# Patient Record
Sex: FEMALE | Race: WHITE | NOT HISPANIC OR LATINO
[De-identification: names, ages, dates, MRNs, and addresses within clinical notes are randomized per-mention and may not be internally consistent; named-entity substitution may affect disease eponyms.]

---

## 2017-01-03 ENCOUNTER — LAB SERVICES (OUTPATIENT)
Dept: OTHER | Age: 52
End: 2017-01-03

## 2017-01-04 LAB
APPEARANCE, URINE: CLEAR
BACTERIA, URINE: ABNORMAL
BILIRUBIN-URINE: ABNORMAL
COLOR, URINE: YELLOW
GLUCOSE URINE-UA: ABNORMAL
HEMATOCRIT: 42.3 % (ref 37–47)
HEMOGLOBIN: 14.1 GM/DL (ref 12–16)
HYALINE CAST, URINE: ABNORMAL /LPF
KETONE-URINE: ABNORMAL
MEAN CORPUSCULAR HEMOGLOBIN: 30.4 PG/CELL (ref 27–35)
MEAN CORPUSCULAR HGB CONC: 33.4 G/DL (ref 32–36)
MEAN CORPUSCULAR VOLUME: 90.7 FL (ref 81–102)
MEAN PLATELET VOLUME: 6.7 FL (ref 6.7–10.4)
MUCUS, URINE: ABNORMAL /LPF
NITRITE-URINE: ABNORMAL
NO CRYSTALS, URINE: ABNORMAL
OCCULT BLOOD URINE: ABNORMAL
PH-URINE: 6 (ref 5–8)
PLATELET COUNT: 299 K/UL (ref 145–375)
PROTEIN-URINE-DIP: ABNORMAL
RBC-URINE: ABNORMAL /HPF (ref 0–2)
RED CELL COUNT: 4.66 M/UL (ref 3.8–5.1)
RED CELL DISTRIBUTION WIDTH: 11.2 % (ref 11.5–14.5)
SPECIFIC GRAVITY-URINE: 1.02 (ref 1.01–1.03)
SQUAMOUS EPITHELIAL CELL URINE: ABNORMAL /LPF (ref 0–2)
URINE LEUKOCYTE ESTERASE: ABNORMAL
UROBILINOGEN-URINE: ABNORMAL MG/DL (ref 0.2–1)
WBC-URINE: ABNORMAL /HPF (ref 0–2)
WHITE BLOOD COUNT: 5.1 K/UL (ref 4.8–10.8)

## 2017-03-07 ENCOUNTER — TELEPHONE (OUTPATIENT)
Dept: FAMILY MEDICINE CLINIC | Facility: CLINIC | Age: 52
End: 2017-03-07

## 2017-03-07 DIAGNOSIS — E78.5 HYPERLIPIDEMIA, UNSPECIFIED HYPERLIPIDEMIA TYPE: ICD-10-CM

## 2017-03-07 DIAGNOSIS — G62.9 NEUROPATHY: ICD-10-CM

## 2017-03-07 RX ORDER — SIMVASTATIN 40 MG
TABLET ORAL
Qty: 30 TABLET | Refills: 11 | Status: SHIPPED | OUTPATIENT
Start: 2017-03-07 | End: 2018-04-02 | Stop reason: SDUPTHER

## 2017-04-01 LAB
CHOLESTEROL, TOTAL: 177 MG/DL
CHOLESTEROL/HDL RATIO: 4.5
HDLC SERPL-MCNC: 39 MG/DL (ref 35–70)
LDL CHOLESTEROL CALCULATED: 93 MG/DL (ref 0–160)
TRIGL SERPL-MCNC: 223 MG/DL
VLDLC SERPL CALC-MCNC: 45 MG/DL

## 2017-04-03 DIAGNOSIS — Z00.00 WELL ADULT EXAM: ICD-10-CM

## 2017-04-03 LAB
ALBUMIN SERPL-MCNC: NORMAL G/DL
ALP BLD-CCNC: NORMAL U/L
ALT SERPL-CCNC: NORMAL U/L
AST SERPL-CCNC: NORMAL U/L
BASOPHILS ABSOLUTE: NORMAL /ΜL
BASOPHILS RELATIVE PERCENT: NORMAL %
BILIRUB SERPL-MCNC: NORMAL MG/DL (ref 0.1–1.4)
BUN BLDV-MCNC: NORMAL MG/DL
CALCIUM SERPL-MCNC: NORMAL MG/DL
CHLORIDE BLD-SCNC: NORMAL MMOL/L
CO2: NORMAL MMOL/L
CREAT SERPL-MCNC: NORMAL MG/DL
EOSINOPHILS ABSOLUTE: NORMAL /ΜL
EOSINOPHILS RELATIVE PERCENT: NORMAL %
GFR CALCULATED: NORMAL
GLUCOSE BLD-MCNC: NORMAL MG/DL
HCT VFR BLD CALC: NORMAL % (ref 36–46)
HEMOGLOBIN: NORMAL G/DL (ref 12–16)
LYMPHOCYTES ABSOLUTE: NORMAL /ΜL
LYMPHOCYTES RELATIVE PERCENT: NORMAL %
MCH RBC QN AUTO: NORMAL PG
MCHC RBC AUTO-ENTMCNC: NORMAL G/DL
MCV RBC AUTO: NORMAL FL
MONOCYTES ABSOLUTE: NORMAL /ΜL
MONOCYTES RELATIVE PERCENT: NORMAL %
NEUTROPHILS ABSOLUTE: NORMAL /ΜL
NEUTROPHILS RELATIVE PERCENT: NORMAL %
PDW BLD-RTO: NORMAL %
PLATELET # BLD: NORMAL K/ΜL
PMV BLD AUTO: NORMAL FL
POTASSIUM SERPL-SCNC: NORMAL MMOL/L
PTH INTACT: NORMAL
RBC # BLD: NORMAL 10^6/ΜL
SODIUM BLD-SCNC: NORMAL MMOL/L
T4 FREE: NORMAL
TOTAL PROTEIN: NORMAL
TSH SERPL DL<=0.05 MIU/L-ACNC: NORMAL UIU/ML
VITAMIN D 25-HYDROXY: NORMAL
VITAMIN D2, 25 HYDROXY: NORMAL
VITAMIN D3,25 HYDROXY: NORMAL
WBC # BLD: NORMAL 10^3/ML

## 2017-04-12 ENCOUNTER — CHARTING TRANS (OUTPATIENT)
Dept: OTHER | Age: 52
End: 2017-04-12

## 2017-04-12 ENCOUNTER — LAB SERVICES (OUTPATIENT)
Dept: OTHER | Age: 52
End: 2017-04-12

## 2017-04-19 ENCOUNTER — TELEPHONE (OUTPATIENT)
Dept: FAMILY MEDICINE CLINIC | Age: 52
End: 2017-04-19

## 2017-04-19 DIAGNOSIS — G89.29 CHRONIC PAIN OF RIGHT KNEE: Primary | ICD-10-CM

## 2017-04-19 DIAGNOSIS — M25.561 CHRONIC PAIN OF RIGHT KNEE: Primary | ICD-10-CM

## 2017-04-19 LAB
ALBUMIN: 4.3 GM/DL (ref 3.5–5)
ALKALINE PHOSPHATASE: 70 U/L (ref 38–126)
ALT: 26 U/L (ref 10–52)
AST/SGOT: 24 U/L (ref 14–40)
BASO #: 0.02 K/UL (ref 0–0.2)
BASO %: 0 % (ref 0–2)
BILIRUBIN TOTAL: 0.7 MG/DL (ref 0.2–1.3)
BUN: 16 MG/DL (ref 7–17)
CALCIUM: 9.4 MG/DL (ref 8.4–10.2)
CARBON DIOXIDE: 31 MMOL/L (ref 22–30)
CHLORIDE: 98 MMOL/L (ref 98–107)
CREATININE: 0.89 MG/DL (ref 0.52–1.04)
EGFR FOR AFRICAN AMERICANS: > 60
EGFR FOR NON-AFRICAN AMERICANS: > 60
EOS #: 0.19 K/UL (ref 0–0.5)
EOS %: 4 % (ref 0–5)
FOLATE SERUM: 18.7 NG/ML
GLUCOSE: 92 MG/DL (ref 70–99)
HEMATOCRIT: 44.3 % (ref 37–47)
HEMOGLOBIN: 14.9 GM/DL (ref 12–16)
LYMPH #: 1.38 K/UL (ref 1–4.8)
LYMPH %: 26 % (ref 22–44)
MAGNESIUM: 2.5 MG/DL (ref 1.6–2.3)
MEAN CORPUSCULAR HEMOGLOBIN: 30.2 PG/CELL (ref 27–35)
MEAN CORPUSCULAR HGB CONC: 33.7 G/DL (ref 32–36)
MEAN CORPUSCULAR VOLUME: 89.7 FL (ref 81–102)
MEAN PLATELET VOLUME: 6.7 FL (ref 6.7–10.4)
MONO #: 0.86 K/UL (ref 0–0.8)
MONO %: 16 % (ref 2–10)
NEUTROPHILS #: 2.83 K/UL (ref 1.8–7.7)
NEUTROPHILS %: 54 % (ref 40–70)
PLATELET COUNT: 278 K/UL (ref 145–375)
POTASSIUM: 4.2 MMOL/L (ref 3.5–5)
RED CELL COUNT: 4.94 M/UL (ref 3.8–5.1)
RED CELL DISTRIBUTION WIDTH: 10.8 % (ref 11.5–14.5)
SODIUM: 140 MMOL/L (ref 136–145)
THYROID STIMULATING HORMONE: 0.95 MIU/ML (ref 0.47–4.68)
TOTAL PROTEIN: 8 GM/DL (ref 6.3–8.3)
VITAMIN B12: 430 PG/ML (ref 239–931)
WHITE BLOOD COUNT: 5.3 K/UL (ref 4.8–10.8)

## 2017-04-25 ENCOUNTER — CHARTING TRANS (OUTPATIENT)
Dept: OTHER | Age: 52
End: 2017-04-25

## 2017-06-14 ENCOUNTER — OFFICE VISIT (OUTPATIENT)
Dept: FAMILY MEDICINE CLINIC | Age: 52
End: 2017-06-14
Payer: COMMERCIAL

## 2017-06-14 DIAGNOSIS — G89.4 CHRONIC PAIN SYNDROME: Primary | ICD-10-CM

## 2017-06-14 PROCEDURE — 96127 BRIEF EMOTIONAL/BEHAV ASSMT: CPT | Performed by: FAMILY MEDICINE

## 2017-06-14 PROCEDURE — 99213 OFFICE O/P EST LOW 20 MIN: CPT | Performed by: FAMILY MEDICINE

## 2017-06-14 RX ORDER — TRIAMCINOLONE ACETONIDE 55 UG/1
110 SPRAY, METERED NASAL PRN
COMMUNITY
End: 2020-10-29

## 2017-06-14 RX ORDER — DULOXETIN HYDROCHLORIDE 60 MG/1
60 CAPSULE, DELAYED RELEASE ORAL DAILY
Qty: 30 CAPSULE | Refills: 11 | Status: SHIPPED | OUTPATIENT
Start: 2017-06-14 | End: 2018-10-17

## 2017-06-14 RX ORDER — DULOXETIN HYDROCHLORIDE 30 MG/1
30 CAPSULE, DELAYED RELEASE ORAL DAILY
Qty: 45 CAPSULE | Refills: 0 | Status: SHIPPED | OUTPATIENT
Start: 2017-06-14 | End: 2017-07-10 | Stop reason: SDUPTHER

## 2017-06-14 RX ORDER — CHOLECALCIFEROL (VITAMIN D3) 50 MCG
2000 TABLET ORAL
COMMUNITY
End: 2018-10-17

## 2017-06-14 ASSESSMENT — PATIENT HEALTH QUESTIONNAIRE - PHQ9
2. FEELING DOWN, DEPRESSED OR HOPELESS: 1
6. FEELING BAD ABOUT YOURSELF - OR THAT YOU ARE A FAILURE OR HAVE LET YOURSELF OR YOUR FAMILY DOWN: 1
3. TROUBLE FALLING OR STAYING ASLEEP: 1
8. MOVING OR SPEAKING SO SLOWLY THAT OTHER PEOPLE COULD HAVE NOTICED. OR THE OPPOSITE, BEING SO FIGETY OR RESTLESS THAT YOU HAVE BEEN MOVING AROUND A LOT MORE THAN USUAL: 0
SUM OF ALL RESPONSES TO PHQ QUESTIONS 1-9: 3
4. FEELING TIRED OR HAVING LITTLE ENERGY: 0
9. THOUGHTS THAT YOU WOULD BE BETTER OFF DEAD, OR OF HURTING YOURSELF: 0
SUM OF ALL RESPONSES TO PHQ9 QUESTIONS 1 & 2: 1
7. TROUBLE CONCENTRATING ON THINGS, SUCH AS READING THE NEWSPAPER OR WATCHING TELEVISION: 0
1. LITTLE INTEREST OR PLEASURE IN DOING THINGS: 0
5. POOR APPETITE OR OVEREATING: 0
10. IF YOU CHECKED OFF ANY PROBLEMS, HOW DIFFICULT HAVE THESE PROBLEMS MADE IT FOR YOU TO DO YOUR WORK, TAKE CARE OF THINGS AT HOME, OR GET ALONG WITH OTHER PEOPLE: 1

## 2017-06-27 ENCOUNTER — CHARTING TRANS (OUTPATIENT)
Dept: OTHER | Age: 52
End: 2017-06-27

## 2017-06-27 ENCOUNTER — LAB SERVICES (OUTPATIENT)
Dept: OTHER | Age: 52
End: 2017-06-27

## 2017-06-28 ENCOUNTER — CHARTING TRANS (OUTPATIENT)
Dept: OTHER | Age: 52
End: 2017-06-28

## 2017-06-28 LAB
ANION GAP SERPL CALC-SCNC: 13 MMOL/L (ref 10–20)
BUN SERPL-MCNC: 22 MG/DL (ref 6–20)
BUN/CREAT SERPL: 27 (ref 7–25)
CALCIUM SERPL-MCNC: 9.5 MG/DL (ref 8.4–10.2)
CHLORIDE SERPL-SCNC: 103 MMOL/L (ref 98–107)
CO2 SERPL-SCNC: 29 MMOL/L (ref 21–32)
CREAT SERPL-MCNC: 0.81 MG/DL (ref 0.51–0.95)
GLUCOSE SERPL-MCNC: 89 MG/DL (ref 65–99)
LENGTH OF FAST TIME PATIENT: ABNORMAL HRS
MAGNESIUM SERPL-MCNC: 2.5 MG/DL (ref 1.7–2.4)
POTASSIUM SERPL-SCNC: 3.9 MMOL/L (ref 3.4–5.1)
SODIUM SERPL-SCNC: 141 MMOL/L (ref 135–145)

## 2017-07-11 ENCOUNTER — TELEPHONE (OUTPATIENT)
Dept: FAMILY MEDICINE CLINIC | Age: 52
End: 2017-07-11

## 2017-08-07 ENCOUNTER — TELEPHONE (OUTPATIENT)
Dept: FAMILY MEDICINE CLINIC | Age: 52
End: 2017-08-07

## 2017-10-02 ENCOUNTER — OFFICE VISIT (OUTPATIENT)
Dept: FAMILY MEDICINE CLINIC | Age: 52
End: 2017-10-02
Payer: COMMERCIAL

## 2017-10-02 VITALS
WEIGHT: 135.8 LBS | HEIGHT: 62 IN | SYSTOLIC BLOOD PRESSURE: 120 MMHG | RESPIRATION RATE: 16 BRPM | HEART RATE: 113 BPM | OXYGEN SATURATION: 97 % | BODY MASS INDEX: 24.99 KG/M2 | DIASTOLIC BLOOD PRESSURE: 72 MMHG

## 2017-10-02 DIAGNOSIS — J20.0 ACUTE BRONCHITIS DUE TO MYCOPLASMA PNEUMONIAE: Primary | ICD-10-CM

## 2017-10-02 PROCEDURE — 99213 OFFICE O/P EST LOW 20 MIN: CPT | Performed by: FAMILY MEDICINE

## 2017-10-02 RX ORDER — HYDROCODONE POLISTIREX AND CHLORPHENIRAMINE POLISTIREX 10; 8 MG/5ML; MG/5ML
5 SUSPENSION, EXTENDED RELEASE ORAL EVERY 12 HOURS PRN
Qty: 115 ML | Refills: 0 | Status: SHIPPED | OUTPATIENT
Start: 2017-10-02 | End: 2018-10-17

## 2017-10-02 RX ORDER — PREDNISONE 20 MG/1
TABLET ORAL
Qty: 20 TABLET | Refills: 0 | Status: SHIPPED | OUTPATIENT
Start: 2017-10-02 | End: 2018-10-17

## 2017-10-02 RX ORDER — MOXIFLOXACIN HYDROCHLORIDE 400 MG/1
400 TABLET ORAL DAILY
Qty: 10 TABLET | Refills: 0 | Status: SHIPPED | OUTPATIENT
Start: 2017-10-02 | End: 2017-10-12

## 2017-10-02 NOTE — MR AVS SNAPSHOT
PROAIR  (90 Base) MCG/ACT inhaler   Instructions:  Inhale 2 puffs into the lungs every 6 hours as needed for Wheezing   Quantity:  1 Inhaler   Refills:  3   Generic drug:  albuterol sulfate HFA   Started by:  Anjana Shaw, DO            Where to Get Your Medications      These medications were sent to [facility] [address], [facility] [place] [address]     Phone:  [phone]     hydrocodone-chlorpheniramine 10-8 MG/5ML Suer    moxifloxacin 400 MG tablet    PROAIR  (90 Base) MCG/ACT inhaler               Your Current Medications Are              moxifloxacin (AVELOX) 400 MG tablet Take 1 tablet by mouth daily for 10 days    PROAIR  (90 Base) MCG/ACT inhaler Inhale 2 puffs into the lungs every 6 hours as needed for Wheezing    hydrocodone-chlorpheniramine (TUSSIONEX PENNKINETIC ER) 10-8 MG/5ML SUER Take 5 mLs by mouth every 12 hours as needed (cough) . naproxen (NAPROSYN) 500 MG tablet TAKE ONE TABLET BY MOUTH TWICE A DAY WITH FOOD    DULoxetine (CYMBALTA) 60 MG extended release capsule Take 1 capsule by mouth daily    Cholecalciferol (VITAMIN D) 2000 units TABS tablet Take 2,000 Units by mouth    doxyLAMINE succinate (GNP SLEEP AID) 25 MG tablet Take 25 mg by mouth    triamcinolone (NASACORT) 55 MCG/ACT nasal inhaler 110 mcg by Nasal route    DULoxetine (CYMBALTA) 60 MG extended release capsule Take 1 capsule by mouth daily    simvastatin (ZOCOR) 40 MG tablet TAKE ONE TABLET BY MOUTH EVERY NIGHT AT BEDTIME    Multiple Vitamin (DAILY VITAMIN PO) Take  by mouth.       Allergies              Trazodone And Nefazodone     Nucynta [Tapentadol] Nausea And Vomiting         Additional Information        Basic Information     Date Of Birth Sex Race Ethnicity Preferred Language    1965 Female White Non-/Non  English      Problem List as of 10/2/2017  Date Reviewed: 12/22/2016                Hyperlipidemia Immunizations as of 10/2/2017     Name Date    Influenza Virus Vaccine 11/14/2013, 10/5/2012, 12/13/2011    Influenza, Ponciano Rubinstein, 3 Years and older, IM 12/22/2016    Pneumococcal 13-valent Conjugate (Chrissy Loza) 12/22/2016      Preventive Care        Date Due    Hepatitis C screening is recommended for all adults regardless of risk factors born between [facility] at least once (lifetime) who have never been tested. 1965    HIV screening is recommended for all people regardless of risk factors  aged 15-65 years at least once (lifetime) who have never been HIV tested. 10/18/1980    Tetanus Combination Vaccine (1 - Tdap) 10/18/1984    Pneumococcal Vaccine - Pneumovax for adults aged 19-64 years with: chronic heart disease, chronic lung disease, diabetes mellitus, alcoholism, chronic liver disease, or cigarette smoking. (1 of 1 - PPSV23) 10/18/1984    Pap Smear 10/18/1986    Colonoscopy 10/18/2015    Mammograms are recommended every 2 years for low/average risk patients aged 48 - 69, and every year for high risk patients per updated national guidelines. However these guidelines can be individualized by your provider. 6/29/2017    Yearly Flu Vaccine (1) 9/1/2017    Cholesterol Screening 4/1/2022            [employer] Signup           [employer] allows you to send messages to your doctor, view your test results, renew your prescriptions, schedule appointments, view visit notes, and more. How Do I Sign Up? 1. In your Internet browser, go to [email or web address]  2. Click on the Sign Up Now link in the Sign In box. You will see the New Member Sign Up page. 3. Enter your [employer] Access Code exactly as it appears below. You will not need to use this code after youve completed the sign-up process. If you do not sign up before the expiration date, you must request a new code.   [employer] Access Code: [email or web address]  Expires: 10/28/2017  9:27 AM 4. Enter your Social Security Number ([id number]) and Date of Birth (mm/dd/yyyy) as indicated and click Submit. You will be taken to the next sign-up page. 5. Create a Viewpoint ID. This will be your Viewpoint login ID and cannot be changed, so think of one that is secure and easy to remember. 6. Create a Viewpoint password. You can change your password at any time. 7. Enter your Password Reset Question and Answer. This can be used at a later time if you forget your password. 8. Enter your e-mail address. You will receive e-mail notification when new information is available in [address] 9. Click Sign Up. You can now view your medical record. Additional Information  If you have questions, please contact the physician practice where you receive care. Remember, [employer] is NOT to be used for urgent needs. For medical emergencies, dial 911. For questions regarding your [employer] account call [phone]. If you have a clinical question, please call your doctor's office.

## 2017-10-02 NOTE — LETTER
[facility]  [address] [address]  Phone: [phone]  Fax: [phone]    Shonda Quevedo DO        October 2, 2017     Patient: Chintan Chacon   YOB: 1965   Date of Visit: 10/2/2017       To Whom It May Concern: It is my medical opinion that Neli Boateng requires a disability parking placard for the following reasons:  She has limited walking ability due to an orthopedic condition. Duration of need: 5 years    If you have any questions or concerns, please don't hesitate to call.     Sincerely,        Zhane Farah, DO

## 2017-10-02 NOTE — PROGRESS NOTES
[facility]  COMPREHENSIVE CARE  [address]  [address] [address]  Dept: [phone]      Justin Morris is a 46 y.o. female who presents today for follow up on her  medical conditions as noted below. Chief Complaint   Patient presents with    Cough     cough x's 1wk, congested x's 2wks, been taking mucinex for 2wks       Patient Active Problem List:     Hyperlipidemia     Cervical pain (neck)     Hypercholesterolemia     Past Medical History:   Diagnosis Date    Hyperlipidemia       Past Surgical History:   Procedure Laterality Date    WISDOM TOOTH EXTRACTION       Family History   Problem Relation Age of Onset    Adopted: Yes       Current Outpatient Prescriptions   Medication Sig Dispense Refill    moxifloxacin (AVELOX) 400 MG tablet Take 1 tablet by mouth daily for 10 days 10 tablet 0    PROAIR  (90 Base) MCG/ACT inhaler Inhale 2 puffs into the lungs every 6 hours as needed for Wheezing 1 Inhaler 3    hydrocodone-chlorpheniramine (TUSSIONEX PENNKINETIC ER) 10-8 MG/5ML SUER Take 5 mLs by mouth every 12 hours as needed (cough) .  115 mL 0    predniSONE (DELTASONE) 20 MG tablet 1 p.o. Q.i.d. x2 days, 1 p.o. T.i.d. x2 days, 1 p.o. B.i.d. x2 days, 1 p.o. Q. Day x2 days dispense quantity suff 20 tablet 0    naproxen (NAPROSYN) 500 MG tablet TAKE ONE TABLET BY MOUTH TWICE A DAY WITH FOOD 60 tablet 11    DULoxetine (CYMBALTA) 60 MG extended release capsule Take 1 capsule by mouth daily 90 capsule 3    Cholecalciferol (VITAMIN D) 2000 units TABS tablet Take 2,000 Units by mouth      doxyLAMINE succinate (GNP SLEEP AID) 25 MG tablet Take 25 mg by mouth      triamcinolone (NASACORT) 55 MCG/ACT nasal inhaler 110 mcg by Nasal route      DULoxetine (CYMBALTA) 60 MG extended release capsule Take 1 capsule by mouth daily 30 capsule 11    simvastatin (ZOCOR) 40 MG tablet TAKE ONE TABLET BY MOUTH EVERY NIGHT AT BEDTIME 30 tablet 11    Multiple Vitamin (DAILY VITAMIN PO) Take by mouth. No current facility-administered medications for this visit. ALLERGIES:    Allergies   Allergen Reactions    Trazodone And Nefazodone     Nucynta [Tapentadol] Nausea And Vomiting       Social History   Substance Use Topics    Smoking status: Current Every Day Smoker     Packs/day: 0.80     Years: 20.00     Types: Cigarettes    Smokeless tobacco: Never Used    Alcohol use 0.0 oz/week     0 Standard drinks or equivalent per week      Comment: socially        LDL Calculated (mg/dL)   Date Value   04/01/2017 93     HDL (mg/dL)   Date Value   04/01/2017 39     BUN (mg/dL)   Date Value   06/01/2013 14     CREATININE (no units)   Date Value   06/01/2013 0.81     Glucose (mg/dL)   Date Value   06/01/2013 95     Hemoglobin A1C (%)   Date Value   07/14/2012 5.6              Subjective:      HPI  She is here today complaining of having a cough for the last 2 weeks started with a sore throat and congestion now it is under a cough or chest hurts her back hurts her stomach hurts from coughing so much she can't get any rest and she is miserable    Also continues to have miserable ongoing knee pain which is unrelenting she has got [facility] who has a program that would be very helpful for her but unfortunately insurance has denied her being able to go to that I think this program would be her best bet I'm going to try to peel this with the insurance company    Review of Systems:     Constitutional: Negative for fever, appetite change and fatigue. Family social and medical history reviewed and unchanged     HENT: Negative. Negative for nosebleeds, trouble swallowing and neck pain. Eyes: Negative for photophobia and visual disturbance. Respiratory: Negative. Negative for chest tightness and shortness of breath. Cardiovascular: Negative. Negative for chest pain and leg swelling. Gastrointestinal: Negative. Negative for abdominal pain and blood in stool.    Endocrine: Negative for cold intolerance and polyuria. Genitourinary: Negative for dysuria and hematuria. Musculoskeletal: Negative. Skin: Negative for rash. Allergic/Immunologic: Negative. Neurological: Negative. Negative for dizziness, weakness and numbness. Hematological: Negative. Negative for adenopathy. Does not bruise/bleed easily. Psychiatric/Behavioral: Negative for sleep disturbance, dysphoric mood and  decreased concentration. The patient is not nervous/anxious. Objective:     Physical Exam:     Nursing note and vitals reviewed. /72  Pulse 113  Resp 16  Ht 5' 1.81\" (1.57 m)  Wt 135 lb 12.8 oz (61.6 kg)  SpO2 97%  Breastfeeding? No  BMI 24.99 kg/m2  Constitutional: She is oriented to person, place, and time. She   appears well-developed and well-nourished. HENT:   Head: Normocephalic and atraumatic. Right Ear: External ear normal. Tympanic membrane is not erythematous. No middle ear effusion. Left Ear: External ear normal. Tympanic membrane is not erythematous. No middle ear effusion. Nose: No mucosal edema. Mouth/Throat: Oropharynx is clear and moist.+ posterior oropharyngeal erythema. Eyes: Conjunctivae and EOM are normal. Pupils are equal, round, and reactive to light. Neck: Normal range of motion. Neck supple. No thyromegaly present. Cardiovascular: Normal rate, regular rhythm and normal heart sounds. No murmur heard. Pulmonary/Chest: Effort normal and breath sounds normal. She has no wheezes. Shehas no rales. constant coughing  Abdominal: Soft. Bowel sounds are normal. She exhibits no distension and no mass. There is no tenderness. There is no rebound and no guarding. Genitourinary/Anorectal:deferred  Musculoskeletal: Normal range of motion. She exhibits no edema or tenderness. Lymphadenopathy: She has no cervical adenopathy. Neurological: She is alert and oriented to person, place, and time. She has normal reflexes. Skin: Skin is warm and dry.  No rash noted.   Psychiatric: She has a normal mood and affect. Her   behavior is normal.       Assessment:      1. Acute bronchitis due to Mycoplasma pneumoniae          Plan:      Call or return to clinic prn if these symptoms worsen or fail to improve as anticipated. I have reviewed the instructions with the patient, answering all questions to her satisfaction. No Follow-up on file. No orders of the defined types were placed in this encounter. Orders Placed This Encounter   Medications    moxifloxacin (AVELOX) 400 MG tablet     Sig: Take 1 tablet by mouth daily for 10 days     Dispense:  10 tablet     Refill:  0    PROAIR  (90 Base) MCG/ACT inhaler     Sig: Inhale 2 puffs into the lungs every 6 hours as needed for Wheezing     Dispense:  1 Inhaler     Refill:  3    hydrocodone-chlorpheniramine (TUSSIONEX PENNKINETIC ER) 10-8 MG/5ML SUER     Sig: Take 5 mLs by mouth every 12 hours as needed (cough) .      Dispense:  115 mL     Refill:  0    predniSONE (DELTASONE) 20 MG tablet     Si p.o. Q.i.d. x2 days, 1 p.o. T.i.d. x2 days, 1 p.o. B.i.d. x2 days, 1 p.o. Q. Day x2 days dispense quantity suff     Dispense:  20 tablet     Refill:  0       Electronically signed by Ghulam Hitchcock DO on 10/2/2017 at 2:16 PM

## 2017-11-03 ENCOUNTER — TELEPHONE (OUTPATIENT)
Dept: FAMILY MEDICINE CLINIC | Age: 52
End: 2017-11-03

## 2017-11-03 NOTE — TELEPHONE ENCOUNTER
Called patient to let her know the referral was approved via peer to peer, phone line is busy on numerous attempts

## 2018-01-18 ENCOUNTER — CHARTING TRANS (OUTPATIENT)
Dept: OTHER | Age: 53
End: 2018-01-18

## 2018-02-16 ENCOUNTER — CHARTING TRANS (OUTPATIENT)
Dept: OTHER | Age: 53
End: 2018-02-16

## 2018-04-02 DIAGNOSIS — G62.9 NEUROPATHY: ICD-10-CM

## 2018-04-02 DIAGNOSIS — E78.5 HYPERLIPIDEMIA, UNSPECIFIED HYPERLIPIDEMIA TYPE: ICD-10-CM

## 2018-04-03 RX ORDER — SIMVASTATIN 40 MG
TABLET ORAL
Qty: 30 TABLET | Refills: 10 | Status: SHIPPED | OUTPATIENT
Start: 2018-04-03 | End: 2019-05-15 | Stop reason: SDUPTHER

## 2018-05-11 ENCOUNTER — CHARTING TRANS (OUTPATIENT)
Dept: OTHER | Age: 53
End: 2018-05-11

## 2018-07-20 DIAGNOSIS — M25.561 CHRONIC PAIN OF RIGHT KNEE: ICD-10-CM

## 2018-07-20 DIAGNOSIS — G89.29 CHRONIC PAIN OF RIGHT KNEE: ICD-10-CM

## 2018-07-23 RX ORDER — NAPROXEN 500 MG/1
TABLET ORAL
Qty: 60 TABLET | Refills: 10 | Status: SHIPPED | OUTPATIENT
Start: 2018-07-23 | End: 2019-09-10 | Stop reason: SDUPTHER

## 2018-10-17 ENCOUNTER — OFFICE VISIT (OUTPATIENT)
Dept: FAMILY MEDICINE CLINIC | Age: 53
End: 2018-10-17
Payer: COMMERCIAL

## 2018-10-17 VITALS
HEIGHT: 62 IN | RESPIRATION RATE: 16 BRPM | HEART RATE: 111 BPM | SYSTOLIC BLOOD PRESSURE: 123 MMHG | WEIGHT: 137.6 LBS | DIASTOLIC BLOOD PRESSURE: 75 MMHG | BODY MASS INDEX: 25.32 KG/M2

## 2018-10-17 DIAGNOSIS — Z13.31 POSITIVE DEPRESSION SCREENING: ICD-10-CM

## 2018-10-17 DIAGNOSIS — G89.29 CHRONIC PAIN OF RIGHT KNEE: Primary | ICD-10-CM

## 2018-10-17 DIAGNOSIS — Z11.4 SCREENING FOR HIV (HUMAN IMMUNODEFICIENCY VIRUS): ICD-10-CM

## 2018-10-17 DIAGNOSIS — Z11.59 NEED FOR HEPATITIS C SCREENING TEST: ICD-10-CM

## 2018-10-17 DIAGNOSIS — M25.561 CHRONIC PAIN OF RIGHT KNEE: Primary | ICD-10-CM

## 2018-10-17 DIAGNOSIS — Z23 NEEDS FLU SHOT: ICD-10-CM

## 2018-10-17 DIAGNOSIS — Z12.39 SCREENING FOR BREAST CANCER: ICD-10-CM

## 2018-10-17 DIAGNOSIS — F33.0 MILD EPISODE OF RECURRENT MAJOR DEPRESSIVE DISORDER (HCC): ICD-10-CM

## 2018-10-17 DIAGNOSIS — Z00.00 WELL ADULT EXAM: ICD-10-CM

## 2018-10-17 PROCEDURE — 99214 OFFICE O/P EST MOD 30 MIN: CPT | Performed by: FAMILY MEDICINE

## 2018-10-17 PROCEDURE — G8431 POS CLIN DEPRES SCRN F/U DOC: HCPCS | Performed by: FAMILY MEDICINE

## 2018-10-17 RX ORDER — ESCITALOPRAM OXALATE 10 MG/1
10 TABLET ORAL DAILY
Qty: 30 TABLET | Refills: 1 | Status: SHIPPED | OUTPATIENT
Start: 2018-10-17 | End: 2019-02-06

## 2018-10-17 ASSESSMENT — PATIENT HEALTH QUESTIONNAIRE - PHQ9
SUM OF ALL RESPONSES TO PHQ QUESTIONS 1-9: 2
2. FEELING DOWN, DEPRESSED OR HOPELESS: 1
SUM OF ALL RESPONSES TO PHQ QUESTIONS 1-9: 2
SUM OF ALL RESPONSES TO PHQ9 QUESTIONS 1 & 2: 2
1. LITTLE INTEREST OR PLEASURE IN DOING THINGS: 1

## 2018-10-17 NOTE — PROGRESS NOTES
Procedures    ALEKSANDAR Screen Routine     Standing Status:   Future     Standing Expiration Date:   12/17/2019     Order Specific Question:   Reason for exam:     Answer:   screening    CBC Auto Differential     Standing Status:   Future     Standing Expiration Date:   10/17/2019    Comprehensive Metabolic Panel     Standing Status:   Future     Standing Expiration Date:   10/17/2019    Lipid Panel     Standing Status:   Future     Standing Expiration Date:   10/17/2019     Order Specific Question:   Is Patient Fasting?/# of Hours     Answer:   yes    T4, Free     Standing Status:   Future     Standing Expiration Date:   10/17/2019    Thyroid Peroxidase Antibody     Standing Status:   Future     Standing Expiration Date:   10/17/2019    TSH without Reflex     Standing Status:   Future     Standing Expiration Date:   10/17/2019    Vitamin D 25 Hydroxy     Standing Status:   Future     Standing Expiration Date:   10/17/2019    PTH, Intact     Standing Status:   Future     Standing Expiration Date:   10/17/2019    Hepatitis C Antibody     Standing Status:   Future     Standing Expiration Date:   10/17/2019    HIV Screen     Standing Status:   Future     Standing Expiration Date:   10/17/2019     Orders Placed This Encounter   Medications    escitalopram (LEXAPRO) 10 MG tablet     Sig: Take 1 tablet by mouth daily     Dispense:  30 tablet     Refill:  1   given a flu vacc   Discussed medication get laboratory studies done and I did give her mammogram order in case she would be willing to do it at some point in the near future  Electronically signed by Evaristo Norton DO on 10/17/2018 at 1:06 PM   On the basis of positive PHQ-9 screening (PHQ-9 Total Score: 2), the following plan was implemented: medication prescribed: Lexapro-  10 mg - patient will call for any significant medication side effects or worsening symptoms of depression. Patient will follow-up in 1 month(s) with PCP.

## 2018-10-29 ENCOUNTER — LAB SERVICES (OUTPATIENT)
Dept: OTHER | Age: 53
End: 2018-10-29

## 2018-10-31 LAB
ALBUMIN SERPL-MCNC: 3.6 G/DL (ref 3.6–5.1)
ALBUMIN/GLOB SERPL: 1.1 (ref 1–2.4)
ALP SERPL-CCNC: 41 UNITS/L (ref 45–117)
ALT SERPL-CCNC: 17 UNITS/L
ANION GAP SERPL CALC-SCNC: 10 MMOL/L (ref 10–20)
APPEARANCE UR: ABNORMAL
AST SERPL-CCNC: 15 UNITS/L
BACTERIA #/AREA URNS HPF: ABNORMAL /HPF
BASOPHILS # BLD: 0 K/MCL (ref 0–0.3)
BASOPHILS NFR BLD: 1 %
BILIRUB SERPL-MCNC: 0.4 MG/DL (ref 0.2–1)
BILIRUB UR QL: POSITIVE
BUN SERPL-MCNC: 22 MG/DL (ref 6–20)
BUN/CREAT SERPL: 28 (ref 7–25)
CALCIUM SERPL-MCNC: 9 MG/DL (ref 8.4–10.2)
CHLORIDE SERPL-SCNC: 104 MMOL/L (ref 98–107)
CO2 SERPL-SCNC: 31 MMOL/L (ref 21–32)
COLOR UR: ABNORMAL
CREAT SERPL-MCNC: 0.78 MG/DL (ref 0.51–0.95)
DIFFERENTIAL METHOD BLD: ABNORMAL
EOSINOPHIL # BLD: 0.1 K/MCL (ref 0.1–0.5)
EOSINOPHIL NFR BLD: 3 %
ERYTHROCYTE [DISTWIDTH] IN BLOOD: 12.5 % (ref 11–15)
GLOBULIN SER-MCNC: 3.2 G/DL (ref 2–4)
GLUCOSE SERPL-MCNC: 87 MG/DL (ref 65–99)
GLUCOSE UR-MCNC: NEGATIVE MG/DL
HEMATOCRIT: 43.6 % (ref 36–46.5)
HEMOGLOBIN: 13.9 G/DL (ref 12–15.5)
HYALINE CASTS #/AREA URNS LPF: ABNORMAL /LPF (ref 0–5)
IMM GRANULOCYTES # BLD AUTO: 0 K/MCL (ref 0–0.2)
IMM GRANULOCYTES NFR BLD: 1 %
KETONES UR-MCNC: NEGATIVE MG/DL
LENGTH OF FAST TIME PATIENT: ABNORMAL HRS
LYMPHOCYTES # BLD: 1.3 K/MCL (ref 1–4)
LYMPHOCYTES NFR BLD: 32 %
MEAN CORPUSCULAR HEMOGLOBIN: 28.7 PG (ref 26–34)
MEAN CORPUSCULAR HGB CONC: 31.9 G/DL (ref 32–36.5)
MEAN CORPUSCULAR VOLUME: 90.1 FL (ref 78–100)
MONOCYTES # BLD: 0.7 K/MCL (ref 0.3–0.9)
MONOCYTES NFR BLD: 16 %
MUCOUS THREADS URNS QL MICRO: PRESENT
NEUTROPHILS # BLD: 2 K/MCL (ref 1.8–7.7)
NEUTROPHILS NFR BLD: 47 %
NITRITE UR QL: NEGATIVE
NRBC (NRBCRE): 0 /100 WBC
PH UR: 7 UNITS (ref 5–7)
PLATELET COUNT: 281 K/MCL (ref 140–450)
POTASSIUM SERPL-SCNC: 4 MMOL/L (ref 3.4–5.1)
PROT UR QL: 30 MG/DL
RBC #/AREA URNS HPF: ABNORMAL /HPF (ref 0–3)
RBC-URINE: NEGATIVE
RED CELL COUNT: 4.84 MIL/MCL (ref 4–5.2)
SODIUM SERPL-SCNC: 141 MMOL/L (ref 135–145)
SP GR UR: 1.03 (ref 1–1.03)
SPECIMEN SOURCE: ABNORMAL
SQUAMOUS #/AREA URNS HPF: ABNORMAL /HPF (ref 0–5)
TOTAL PROTEIN: 6.8 G/DL (ref 6.4–8.2)
UROBILINOGEN UR QL: 2 MG/DL (ref 0–1)
WBC #/AREA URNS HPF: ABNORMAL /HPF (ref 0–5)
WBC-URINE: NEGATIVE
WHITE BLOOD COUNT: 4.2 K/MCL (ref 4.2–11)

## 2018-11-01 VITALS
HEART RATE: 70 BPM | HEIGHT: 67 IN | WEIGHT: 154 LBS | DIASTOLIC BLOOD PRESSURE: 62 MMHG | BODY MASS INDEX: 24.17 KG/M2 | SYSTOLIC BLOOD PRESSURE: 118 MMHG | RESPIRATION RATE: 16 BRPM

## 2018-11-01 VITALS
HEIGHT: 67 IN | BODY MASS INDEX: 24.33 KG/M2 | WEIGHT: 155 LBS | HEART RATE: 72 BPM | SYSTOLIC BLOOD PRESSURE: 122 MMHG | RESPIRATION RATE: 16 BRPM | DIASTOLIC BLOOD PRESSURE: 60 MMHG

## 2018-11-02 VITALS
SYSTOLIC BLOOD PRESSURE: 128 MMHG | HEIGHT: 67 IN | HEART RATE: 80 BPM | DIASTOLIC BLOOD PRESSURE: 64 MMHG | RESPIRATION RATE: 16 BRPM | WEIGHT: 153 LBS | BODY MASS INDEX: 24.01 KG/M2

## 2018-11-03 VITALS
HEIGHT: 67 IN | BODY MASS INDEX: 22.91 KG/M2 | RESPIRATION RATE: 17 BRPM | WEIGHT: 146 LBS | DIASTOLIC BLOOD PRESSURE: 80 MMHG | SYSTOLIC BLOOD PRESSURE: 130 MMHG

## 2018-11-05 ENCOUNTER — CHARTING TRANS (OUTPATIENT)
Dept: OTHER | Age: 53
End: 2018-11-05

## 2018-11-05 VITALS
RESPIRATION RATE: 16 BRPM | BODY MASS INDEX: 22.76 KG/M2 | SYSTOLIC BLOOD PRESSURE: 144 MMHG | DIASTOLIC BLOOD PRESSURE: 82 MMHG | WEIGHT: 145 LBS | HEART RATE: 76 BPM | HEIGHT: 67 IN

## 2018-11-05 VITALS
HEIGHT: 67 IN | RESPIRATION RATE: 16 BRPM | BODY MASS INDEX: 22.44 KG/M2 | DIASTOLIC BLOOD PRESSURE: 80 MMHG | SYSTOLIC BLOOD PRESSURE: 138 MMHG | WEIGHT: 143 LBS | HEART RATE: 70 BPM

## 2018-11-27 VITALS
BODY MASS INDEX: 27.15 KG/M2 | HEIGHT: 67 IN | DIASTOLIC BLOOD PRESSURE: 70 MMHG | OXYGEN SATURATION: 100 % | WEIGHT: 173 LBS | RESPIRATION RATE: 16 BRPM | HEART RATE: 76 BPM | SYSTOLIC BLOOD PRESSURE: 104 MMHG

## 2018-11-29 LAB
ALBUMIN SERPL-MCNC: NORMAL G/DL
ALP BLD-CCNC: NORMAL U/L
ALT SERPL-CCNC: NORMAL U/L
ANION GAP SERPL CALCULATED.3IONS-SCNC: NORMAL MMOL/L
ANTIBODY: NORMAL
AST SERPL-CCNC: NORMAL U/L
BASOPHILS ABSOLUTE: NORMAL /ΜL
BASOPHILS RELATIVE PERCENT: NORMAL %
BILIRUB SERPL-MCNC: NORMAL MG/DL (ref 0.1–1.4)
BUN BLDV-MCNC: NORMAL MG/DL
CALCIUM SERPL-MCNC: NORMAL MG/DL
CHLORIDE BLD-SCNC: NORMAL MMOL/L
CHOLESTEROL, TOTAL: 203 MG/DL
CHOLESTEROL/HDL RATIO: 4.8
CO2: NORMAL MMOL/L
CREAT SERPL-MCNC: NORMAL MG/DL
EOSINOPHILS ABSOLUTE: NORMAL /ΜL
EOSINOPHILS RELATIVE PERCENT: NORMAL %
GFR CALCULATED: NORMAL
GLUCOSE BLD-MCNC: NORMAL MG/DL
HCT VFR BLD CALC: NORMAL % (ref 36–46)
HDLC SERPL-MCNC: 42 MG/DL (ref 35–70)
HEMOGLOBIN: NORMAL G/DL (ref 12–16)
HIV AG/AB: NORMAL
LDL CHOLESTEROL CALCULATED: 127 MG/DL (ref 0–160)
LYMPHOCYTES ABSOLUTE: NORMAL /ΜL
LYMPHOCYTES RELATIVE PERCENT: NORMAL %
MCH RBC QN AUTO: NORMAL PG
MCHC RBC AUTO-ENTMCNC: NORMAL G/DL
MCV RBC AUTO: NORMAL FL
MONOCYTES ABSOLUTE: NORMAL /ΜL
MONOCYTES RELATIVE PERCENT: NORMAL %
NEUTROPHILS ABSOLUTE: NORMAL /ΜL
NEUTROPHILS RELATIVE PERCENT: NORMAL %
PDW BLD-RTO: NORMAL %
PLATELET # BLD: NORMAL K/ΜL
PMV BLD AUTO: NORMAL FL
POTASSIUM SERPL-SCNC: NORMAL MMOL/L
PTH INTACT: NORMAL
RBC # BLD: NORMAL 10^6/ΜL
SODIUM BLD-SCNC: NORMAL MMOL/L
T4 FREE: NORMAL
TOTAL PROTEIN: NORMAL
TRIGL SERPL-MCNC: 170 MG/DL
TSH SERPL DL<=0.05 MIU/L-ACNC: NORMAL UIU/ML
VITAMIN D 25-HYDROXY: NORMAL
VITAMIN D2, 25 HYDROXY: NORMAL
VITAMIN D3,25 HYDROXY: NORMAL
VLDLC SERPL CALC-MCNC: 34 MG/DL
WBC # BLD: NORMAL 10^3/ML

## 2018-12-03 DIAGNOSIS — Z00.00 WELL ADULT EXAM: ICD-10-CM

## 2018-12-03 DIAGNOSIS — Z11.59 NEED FOR HEPATITIS C SCREENING TEST: ICD-10-CM

## 2018-12-03 DIAGNOSIS — G89.29 CHRONIC PAIN OF RIGHT KNEE: ICD-10-CM

## 2018-12-03 DIAGNOSIS — Z11.4 SCREENING FOR HIV (HUMAN IMMUNODEFICIENCY VIRUS): ICD-10-CM

## 2018-12-03 DIAGNOSIS — M25.561 CHRONIC PAIN OF RIGHT KNEE: ICD-10-CM

## 2018-12-08 RX ORDER — MINOCYCLINE HYDROCHLORIDE 50 MG/1
1 CAPSULE ORAL 2 TIMES DAILY
COMMUNITY
Start: 2018-06-04 | End: 2018-12-11 | Stop reason: DRUGHIGH

## 2018-12-08 RX ORDER — FLUOXETINE HYDROCHLORIDE 40 MG/1
1 CAPSULE ORAL DAILY
COMMUNITY
Start: 2018-02-13 | End: 2019-01-11 | Stop reason: SDUPTHER

## 2018-12-08 RX ORDER — HYDROCHLOROTHIAZIDE 25 MG/1
TABLET ORAL
COMMUNITY
Start: 2018-05-11 | End: 2019-02-23 | Stop reason: SDUPTHER

## 2018-12-08 RX ORDER — ASPIRIN 81 MG
1 TABLET, DELAYED RELEASE (ENTERIC COATED) ORAL 2 TIMES DAILY
COMMUNITY
Start: 2018-01-18 | End: 2019-01-18

## 2018-12-08 RX ORDER — LAMOTRIGINE 25 MG/1
TABLET ORAL
COMMUNITY
Start: 2018-11-05 | End: 2018-12-11 | Stop reason: DRUGHIGH

## 2018-12-11 ENCOUNTER — OFFICE VISIT (OUTPATIENT)
Dept: FAMILY MEDICINE | Age: 53
End: 2018-12-11

## 2018-12-11 VITALS
SYSTOLIC BLOOD PRESSURE: 108 MMHG | HEIGHT: 67 IN | HEART RATE: 75 BPM | WEIGHT: 168 LBS | DIASTOLIC BLOOD PRESSURE: 70 MMHG | RESPIRATION RATE: 16 BRPM | OXYGEN SATURATION: 100 % | BODY MASS INDEX: 26.37 KG/M2

## 2018-12-11 DIAGNOSIS — F33.42 MAJOR DEPRESSIVE DISORDER, RECURRENT EPISODE, IN FULL REMISSION (CMD): ICD-10-CM

## 2018-12-11 DIAGNOSIS — L70.9 ACNE, UNSPECIFIED ACNE TYPE: ICD-10-CM

## 2018-12-11 DIAGNOSIS — F31.81 BIPOLAR 2 DISORDER, MAJOR DEPRESSIVE EPISODE (CMD): Primary | ICD-10-CM

## 2018-12-11 PROCEDURE — 99214 OFFICE O/P EST MOD 30 MIN: CPT | Performed by: FAMILY MEDICINE

## 2018-12-11 RX ORDER — MINOCYCLINE HYDROCHLORIDE 50 MG/1
50 CAPSULE ORAL 2 TIMES DAILY
Qty: 60 CAPSULE | Refills: 5 | Status: SHIPPED | OUTPATIENT
Start: 2018-12-11 | End: 2019-11-27 | Stop reason: SDUPTHER

## 2018-12-11 RX ORDER — LAMOTRIGINE 25 MG/1
TABLET ORAL
Qty: 120 TABLET | Refills: 0 | Status: SHIPPED | OUTPATIENT
Start: 2018-12-11 | End: 2019-01-11 | Stop reason: DRUGHIGH

## 2018-12-11 SDOH — HEALTH STABILITY: MENTAL HEALTH: HOW OFTEN DO YOU HAVE A DRINK CONTAINING ALCOHOL?: NEVER

## 2018-12-11 ASSESSMENT — ENCOUNTER SYMPTOMS
FEVER: 0
BACK PAIN: 0
STRIDOR: 0
CONFUSION: 0
CHEST TIGHTNESS: 0
FATIGUE: 0

## 2018-12-18 ENCOUNTER — OFFICE VISIT (OUTPATIENT)
Dept: FAMILY MEDICINE CLINIC | Age: 53
End: 2018-12-18
Payer: COMMERCIAL

## 2018-12-18 VITALS
WEIGHT: 136 LBS | SYSTOLIC BLOOD PRESSURE: 129 MMHG | BODY MASS INDEX: 25.03 KG/M2 | RESPIRATION RATE: 14 BRPM | TEMPERATURE: 99.1 F | OXYGEN SATURATION: 98 % | HEIGHT: 62 IN | DIASTOLIC BLOOD PRESSURE: 61 MMHG | HEART RATE: 123 BPM

## 2018-12-18 DIAGNOSIS — M25.561 CHRONIC PAIN OF RIGHT KNEE: ICD-10-CM

## 2018-12-18 DIAGNOSIS — G89.29 CHRONIC PAIN OF RIGHT KNEE: ICD-10-CM

## 2018-12-18 DIAGNOSIS — F41.9 ANXIETY: Primary | ICD-10-CM

## 2018-12-18 PROCEDURE — 99214 OFFICE O/P EST MOD 30 MIN: CPT | Performed by: FAMILY MEDICINE

## 2018-12-18 RX ORDER — CELECOXIB 200 MG/1
200 CAPSULE ORAL 2 TIMES DAILY
Qty: 60 CAPSULE | Refills: 5 | Status: SHIPPED | OUTPATIENT
Start: 2018-12-18 | End: 2019-02-06

## 2018-12-18 RX ORDER — BUPROPION HYDROCHLORIDE 150 MG/1
150 TABLET ORAL EVERY MORNING
Qty: 30 TABLET | Refills: 5 | Status: SHIPPED | OUTPATIENT
Start: 2018-12-18 | End: 2019-06-06

## 2018-12-19 ASSESSMENT — PATIENT HEALTH QUESTIONNAIRE - PHQ9
SUM OF ALL RESPONSES TO PHQ9 QUESTIONS 1 & 2: 0
SUM OF ALL RESPONSES TO PHQ QUESTIONS 1-9: 0
1. LITTLE INTEREST OR PLEASURE IN DOING THINGS: 0
SUM OF ALL RESPONSES TO PHQ QUESTIONS 1-9: 0
2. FEELING DOWN, DEPRESSED OR HOPELESS: 0

## 2018-12-19 NOTE — PROGRESS NOTES
°C) (Oral)   Resp 14   Ht 5' 1.81\" (1.57 m)   Wt 136 lb (61.7 kg)   LMP 03/14/2013   SpO2 98%   BMI 25.03 kg/m²   Constitutional: She is oriented to person, place, and time. She   appears well-developed and well-nourished. HENT:   Head: Normocephalic and atraumatic. Right Ear: External ear normal. Tympanic membrane is not erythematous. No middle ear effusion. Left Ear: External ear normal. Tympanic membrane is not erythematous. No middle ear effusion. Nose: No mucosal edema. Mouth/Throat: Oropharynx is clear and moist. No posterior oropharyngeal erythema. Eyes: Conjunctivae and EOM are normal. Pupils are equal, round, and reactive to light. Neck: Normal range of motion. Neck supple. No thyromegaly present. Cardiovascular: Normal rate, regular rhythm and normal heart sounds. No murmur heard. Pulmonary/Chest: Effort normal and breath sounds normal. She has no wheezes. Shehas no rales. Abdominal: Soft. Bowel sounds are normal. She exhibits no distension and no mass. There is no tenderness. There is no rebound and no guarding. Genitourinary/Anorectal:deferred  Musculoskeletal: Normal range of motion. She exhibits no edema or tenderness. Lymphadenopathy: She has no cervical adenopathy. Neurological: She is alert and oriented to person, place, and time. She has normal reflexes. Skin: Skin is warm and dry. No rash noted. Psychiatric: She has a normal mood and affect. Her   behavior is normal.       Assessment:      1. Anxiety    2. Chronic pain of right knee          Plan:      Call or return to clinic prn if these symptoms worsen or fail to improve as anticipated. I have reviewed the instructions with the patient, answering all questions to her satisfaction. No Follow-up on file. Orders Placed This Encounter   Procedures    EMG     Standing Status:   Future     Standing Expiration Date:   2/16/2019     Order Specific Question:   Which body part?      Answer:   r leg pain

## 2019-01-11 ENCOUNTER — OFFICE VISIT (OUTPATIENT)
Dept: FAMILY MEDICINE | Age: 54
End: 2019-01-11

## 2019-01-11 VITALS
WEIGHT: 174 LBS | HEART RATE: 76 BPM | BODY MASS INDEX: 27.31 KG/M2 | DIASTOLIC BLOOD PRESSURE: 76 MMHG | RESPIRATION RATE: 16 BRPM | SYSTOLIC BLOOD PRESSURE: 112 MMHG | HEIGHT: 67 IN | OXYGEN SATURATION: 99 %

## 2019-01-11 DIAGNOSIS — F33.42 MAJOR DEPRESSIVE DISORDER, RECURRENT EPISODE, IN FULL REMISSION (CMD): ICD-10-CM

## 2019-01-11 DIAGNOSIS — E66.3 OVERWEIGHT (BMI 25.0-29.9): ICD-10-CM

## 2019-01-11 DIAGNOSIS — F31.81 BIPOLAR 2 DISORDER, MAJOR DEPRESSIVE EPISODE (CMD): Primary | ICD-10-CM

## 2019-01-11 PROCEDURE — 99214 OFFICE O/P EST MOD 30 MIN: CPT | Performed by: FAMILY MEDICINE

## 2019-01-11 RX ORDER — LAMOTRIGINE 50 MG/1
50 TABLET, ORALLY DISINTEGRATING ORAL 2 TIMES DAILY
Qty: 60 TABLET | Refills: 1 | Status: SHIPPED | OUTPATIENT
Start: 2019-01-11 | End: 2019-01-24 | Stop reason: CLARIF

## 2019-01-11 RX ORDER — FLUOXETINE HYDROCHLORIDE 40 MG/1
40 CAPSULE ORAL DAILY
Qty: 30 CAPSULE | Refills: 1 | Status: SHIPPED | OUTPATIENT
Start: 2019-01-11 | End: 2019-08-05

## 2019-01-11 SDOH — HEALTH STABILITY: MENTAL HEALTH: HOW OFTEN DO YOU HAVE A DRINK CONTAINING ALCOHOL?: NEVER

## 2019-01-11 ASSESSMENT — ENCOUNTER SYMPTOMS
CONFUSION: 0
STRIDOR: 0
FATIGUE: 0
FEVER: 0
BACK PAIN: 0
CHEST TIGHTNESS: 0

## 2019-01-15 RX ORDER — LAMOTRIGINE 25 MG/1
TABLET ORAL
Qty: 120 TABLET | Refills: 0 | OUTPATIENT
Start: 2019-01-15

## 2019-01-21 RX ORDER — LAMOTRIGINE 25 MG/1
TABLET ORAL
Qty: 120 TABLET | Refills: 0 | OUTPATIENT
Start: 2019-01-21

## 2019-01-24 ENCOUNTER — TELEPHONE (OUTPATIENT)
Dept: FAMILY MEDICINE | Age: 54
End: 2019-01-24

## 2019-01-24 DIAGNOSIS — F31.81 BIPOLAR 2 DISORDER, MAJOR DEPRESSIVE EPISODE (CMD): ICD-10-CM

## 2019-01-24 RX ORDER — LAMOTRIGINE 50 MG/1
50 TABLET, EXTENDED RELEASE ORAL DAILY
Qty: 30 TABLET | Refills: 1 | Status: SHIPPED | OUTPATIENT
Start: 2019-01-24 | End: 2019-01-24

## 2019-01-24 RX ORDER — LAMOTRIGINE 25 MG/1
TABLET ORAL
Qty: 120 TABLET | Refills: 0 | OUTPATIENT
Start: 2019-01-24

## 2019-01-24 RX ORDER — LAMOTRIGINE 25 MG/1
TABLET ORAL
Qty: 120 TABLET | Refills: 2 | Status: SHIPPED | OUTPATIENT
Start: 2019-01-24 | End: 2019-04-27 | Stop reason: SDUPTHER

## 2019-02-06 ENCOUNTER — OFFICE VISIT (OUTPATIENT)
Dept: FAMILY MEDICINE CLINIC | Age: 54
End: 2019-02-06
Payer: COMMERCIAL

## 2019-02-06 VITALS
HEIGHT: 62 IN | WEIGHT: 134 LBS | BODY MASS INDEX: 24.66 KG/M2 | DIASTOLIC BLOOD PRESSURE: 69 MMHG | SYSTOLIC BLOOD PRESSURE: 124 MMHG | RESPIRATION RATE: 16 BRPM | HEART RATE: 106 BPM

## 2019-02-06 DIAGNOSIS — M25.561 CHRONIC PAIN OF RIGHT KNEE: Primary | ICD-10-CM

## 2019-02-06 DIAGNOSIS — G89.29 CHRONIC PAIN OF RIGHT KNEE: Primary | ICD-10-CM

## 2019-02-06 DIAGNOSIS — F41.9 ANXIETY: ICD-10-CM

## 2019-02-06 PROCEDURE — 99214 OFFICE O/P EST MOD 30 MIN: CPT | Performed by: FAMILY MEDICINE

## 2019-02-06 RX ORDER — VORTIOXETINE 10 MG/1
10 TABLET, FILM COATED ORAL DAILY
Qty: 90 TABLET | Refills: 3 | Status: SHIPPED | OUTPATIENT
Start: 2019-02-06 | End: 2019-06-06

## 2019-02-06 ASSESSMENT — PATIENT HEALTH QUESTIONNAIRE - PHQ9
1. LITTLE INTEREST OR PLEASURE IN DOING THINGS: 1
SUM OF ALL RESPONSES TO PHQ9 QUESTIONS 1 & 2: 2
SUM OF ALL RESPONSES TO PHQ QUESTIONS 1-9: 2
SUM OF ALL RESPONSES TO PHQ QUESTIONS 1-9: 2
2. FEELING DOWN, DEPRESSED OR HOPELESS: 1

## 2019-02-25 RX ORDER — HYDROCHLOROTHIAZIDE 25 MG/1
TABLET ORAL
Qty: 30 TABLET | Refills: 5 | OUTPATIENT
Start: 2019-02-25

## 2019-02-25 RX ORDER — HYDROCHLOROTHIAZIDE 25 MG/1
TABLET ORAL
Qty: 90 TABLET | Refills: 1 | Status: SHIPPED | OUTPATIENT
Start: 2019-02-25 | End: 2019-08-05 | Stop reason: CLARIF

## 2019-03-11 ENCOUNTER — APPOINTMENT (OUTPATIENT)
Dept: FAMILY MEDICINE | Age: 54
End: 2019-03-11

## 2019-04-28 RX ORDER — LAMOTRIGINE 25 MG/1
TABLET ORAL
Qty: 120 TABLET | Refills: 2 | Status: SHIPPED | OUTPATIENT
Start: 2019-04-28 | End: 2019-07-22 | Stop reason: SDUPTHER

## 2019-04-29 DIAGNOSIS — M25.561 CHRONIC PAIN OF RIGHT KNEE: ICD-10-CM

## 2019-04-29 DIAGNOSIS — G89.29 CHRONIC PAIN OF RIGHT KNEE: ICD-10-CM

## 2019-05-07 ENCOUNTER — OFFICE VISIT (OUTPATIENT)
Dept: FAMILY MEDICINE CLINIC | Age: 54
End: 2019-05-07
Payer: COMMERCIAL

## 2019-05-07 VITALS
DIASTOLIC BLOOD PRESSURE: 75 MMHG | BODY MASS INDEX: 24.88 KG/M2 | OXYGEN SATURATION: 97 % | HEIGHT: 62 IN | SYSTOLIC BLOOD PRESSURE: 121 MMHG | HEART RATE: 122 BPM | WEIGHT: 135.2 LBS | RESPIRATION RATE: 16 BRPM

## 2019-05-07 DIAGNOSIS — G89.29 CHRONIC PAIN OF RIGHT KNEE: Primary | ICD-10-CM

## 2019-05-07 DIAGNOSIS — Z12.11 SCREENING FOR COLON CANCER: ICD-10-CM

## 2019-05-07 DIAGNOSIS — M25.561 CHRONIC PAIN OF RIGHT KNEE: Primary | ICD-10-CM

## 2019-05-07 PROCEDURE — 99213 OFFICE O/P EST LOW 20 MIN: CPT | Performed by: FAMILY MEDICINE

## 2019-05-07 RX ORDER — LIDOCAINE 50 MG/G
3 PATCH TOPICAL EVERY 24 HOURS
Qty: 90 PATCH | Refills: 0 | Status: SHIPPED | OUTPATIENT
Start: 2019-05-07 | End: 2019-08-01

## 2019-05-07 ASSESSMENT — PATIENT HEALTH QUESTIONNAIRE - PHQ9
2. FEELING DOWN, DEPRESSED OR HOPELESS: 0
1. LITTLE INTEREST OR PLEASURE IN DOING THINGS: 0
SUM OF ALL RESPONSES TO PHQ QUESTIONS 1-9: 0
SUM OF ALL RESPONSES TO PHQ QUESTIONS 1-9: 0
SUM OF ALL RESPONSES TO PHQ9 QUESTIONS 1 & 2: 0

## 2019-05-07 NOTE — PROGRESS NOTES
LDL Calculated (mg/dL)   Date Value   11/29/2018 127     HDL (mg/dL)   Date Value   11/29/2018 42     BUN (mg/dL)   Date Value   06/01/2013 14     CREATININE (no units)   Date Value   06/01/2013 0.81     Glucose (mg/dL)   Date Value   06/01/2013 95     Hemoglobin A1C (%)   Date Value   07/14/2012 5.6              Subjective:      HPI  She is here today for review of her MRI and continued ongoing miserable knee pain she states that her knee pain is debilitating it affects her entire life she is unable to do anything she  life because her knee gives her constant chronic pain she is not able to do home lifestyle things she is not able to go out and function in public and she is in constant pain she has tried every modality imaginable she has had multiple tests done and no one seems to be able to figure out what's wrong with her what she can do to get relief or get fixed she has gone to multiple courses of physical therapy she has tried anti-inflammatories pain medications psychiatric medications and nothing works for her    Review of Systems:     Constitutional: Negative for fever, appetite change and fatigue. Family social and medical history reviewed and unchanged     HENT: Negative. Negative for nosebleeds, trouble swallowing and neck pain. Eyes: Negative for photophobia and visual disturbance. Respiratory: Negative. Negative for chest tightness and shortness of breath. Cardiovascular: Negative. Negative for chest pain and leg swelling. Gastrointestinal: Negative. Negative for abdominal pain and blood in stool. Endocrine: Negative for cold intolerance and polyuria. Genitourinary: Negative for dysuria and hematuria. Musculoskeletal: Negative. Skin: Negative for rash. Allergic/Immunologic: Negative. Neurological: Negative. Negative for dizziness, weakness and numbness. Hematological: Negative. Negative for adenopathy. Does not bruise/bleed easily.    Psychiatric/Behavioral: Negative for sleep disturbance, dysphoric mood and  decreased concentration. The patient is not nervous/anxious. Objective:     Physical Exam:     Nursing note and vitals reviewed. /75   Pulse 122   Resp 16   Ht 5' 2\" (1.575 m)   Wt 135 lb 3.2 oz (61.3 kg)   LMP 03/14/2013   SpO2 97%   BMI 24.73 kg/m²   Constitutional: She is oriented to person, place, and time. She   appears well-developed and well-nourished. HENT:   Head: Normocephalic and atraumatic. Right Ear: External ear normal. Tympanic membrane is not erythematous. No middle ear effusion. Left Ear: External ear normal. Tympanic membrane is not erythematous. No middle ear effusion. Nose: No mucosal edema. Mouth/Throat: Oropharynx is clear and moist. No posterior oropharyngeal erythema. Eyes: Conjunctivae and EOM are normal. Pupils are equal, round, and reactive to light. Neck: Normal range of motion. Neck supple. No thyromegaly present. Cardiovascular: Normal rate, regular rhythm and normal heart sounds. No murmur heard. Pulmonary/Chest: Effort normal and breath sounds normal. She has no wheezes. Shehas no rales. Abdominal: Soft. Bowel sounds are normal. She exhibits no distension and no mass. There is no tenderness. There is no rebound and no guarding. Genitourinary/Anorectal:deferred  Musculoskeletal: Normal range of motion. She exhibits no edema or  +tenderness. right knee she has a gait that she limps and is unable to walk normally because of the right knee   Lymphadenopathy: She has no cervical adenopathy. Neurological: She is alert and oriented to person, place, and time. She has normal reflexes. Skin: Skin is warm and dry. No rash noted. Psychiatric: She has a normal mood and affect. Her   behavior is normal.       Assessment:      1. Chronic pain of right knee          Plan:      Call or return to clinic prn if these symptoms worsen or fail to improve as anticipated.   I have reviewed the instructions with the patient, answering all questions to her satisfaction. No follow-ups on file. Orders Placed This Encounter   Procedures    External Referral To Physical Therapy     Referral Priority:   Routine     Referral Type:   Eval and Treat     Referral Reason:   Specialty Services Required     Requested Specialty:   Physical Therapy     Number of Visits Requested:   1     Orders Placed This Encounter   Medications    lidocaine (LIDODERM) 5 %     Sig: Place 3 patches onto the skin every 24 hours 12 hours on, 12 hours off.      Dispense:  90 patch     Refill:  0     Will try aqua therapy  And try to get a referral to the [facility] clinic to a specialist there    Electronically signed by Aracely Osborne DO on 5/7/2019 at 11:13 AM

## 2019-05-15 DIAGNOSIS — G62.9 NEUROPATHY: ICD-10-CM

## 2019-05-15 DIAGNOSIS — E78.5 HYPERLIPIDEMIA, UNSPECIFIED HYPERLIPIDEMIA TYPE: ICD-10-CM

## 2019-05-16 RX ORDER — SIMVASTATIN 40 MG
TABLET ORAL
Qty: 30 TABLET | Refills: 9 | Status: SHIPPED | OUTPATIENT
Start: 2019-05-16 | End: 2020-02-27 | Stop reason: SDUPTHER

## 2019-06-06 ENCOUNTER — OFFICE VISIT (OUTPATIENT)
Dept: FAMILY MEDICINE CLINIC | Age: 54
End: 2019-06-06
Payer: COMMERCIAL

## 2019-06-06 VITALS
BODY MASS INDEX: 24.84 KG/M2 | WEIGHT: 135 LBS | RESPIRATION RATE: 16 BRPM | DIASTOLIC BLOOD PRESSURE: 68 MMHG | HEIGHT: 62 IN | HEART RATE: 114 BPM | SYSTOLIC BLOOD PRESSURE: 136 MMHG

## 2019-06-06 DIAGNOSIS — M25.561 CHRONIC PAIN OF RIGHT KNEE: ICD-10-CM

## 2019-06-06 DIAGNOSIS — G89.29 CHRONIC PAIN OF RIGHT KNEE: ICD-10-CM

## 2019-06-06 DIAGNOSIS — G62.9 NEUROPATHY: Primary | ICD-10-CM

## 2019-06-06 PROCEDURE — 99213 OFFICE O/P EST LOW 20 MIN: CPT | Performed by: FAMILY MEDICINE

## 2019-06-06 NOTE — PROGRESS NOTES
[facility]  [address] [place] [address] [address] [facility] [id number]  Dept: [phone]      Rolly Almanza is a 48 y.o. female who presents today for follow up on her  medical conditions as noted below. Chief Complaint   Patient presents with    Knee Pain     right knee pain. water therapy failed. not sure what to do next. her lower back is hurting. Patient Active Problem List:     Hyperlipidemia     Cervical pain (neck)     Hypercholesterolemia     Past Medical History:   Diagnosis Date    Hyperlipidemia       Past Surgical History:   Procedure Laterality Date    WISDOM TOOTH EXTRACTION       Family History   Adopted: Yes       Current Outpatient Medications   Medication Sig Dispense Refill    simvastatin (ZOCOR) 40 MG tablet TAKE ONE TABLET BY MOUTH EVERY NIGHT AT BEDTIME 30 tablet 9    lidocaine (LIDODERM) 5 % Place 3 patches onto the skin every 24 hours 12 hours on, 12 hours off. 90 patch 0    naproxen (NAPROSYN) 500 MG tablet TAKE ONE TABLET BY MOUTH TWICE A DAY WITH FOOD 60 tablet 10    doxyLAMINE succinate (GNP SLEEP AID) 25 MG tablet Take 25 mg by mouth      triamcinolone (NASACORT) 55 MCG/ACT nasal inhaler 110 mcg by Nasal route      Multiple Vitamin (DAILY VITAMIN PO) Take  by mouth. No current facility-administered medications for this visit. ALLERGIES:    Allergies   Allergen Reactions    Trazodone And Nefazodone     Nucynta [Tapentadol] Nausea And Vomiting       Social History     Tobacco Use    Smoking status: Current Every Day Smoker     Packs/day: 0.80     Years: 20.00     Pack years: 16.00     Types: Cigarettes    Smokeless tobacco: Never Used   Substance Use Topics    Alcohol use:  Yes     Alcohol/week: 0.0 oz     Comment: socially        LDL Calculated (mg/dL)   Date Value   11/29/2018 127     HDL (mg/dL)   Date Value   11/29/2018 42     BUN (mg/dL)   Date Value   06/01/2013 14     CREATININE (no units)   Date Value 06/01/2013 0.81     Glucose (mg/dL)   Date Value   06/01/2013 95     Hemoglobin A1C (%)   Date Value   07/14/2012 5.6              Subjective:      HPI  She is here today for follow-up on her ongoing chronic knee pain she continues to be miserable states she cannot function or do anything of the daily functions of living at home she can't clean, she barely can get around she uses a mobile scooter  She went to water therapy and involve the land component which she could not do  She states the lidocaine term patches did not help her at all and she continues to just be miserable    Review of Systems:     Constitutional: Negative for fever, appetite change and fatigue. Family social and medical history reviewed and unchanged     HENT: Negative. Negative for nosebleeds, trouble swallowing and neck pain. Eyes: Negative for photophobia and visual disturbance. Respiratory: Negative. Negative for chest tightness and shortness of breath. Cardiovascular: Negative. Negative for chest pain and leg swelling. Gastrointestinal: Negative. Negative for abdominal pain and blood in stool. Endocrine: Negative for cold intolerance and polyuria. Genitourinary: Negative for dysuria and hematuria. Musculoskeletal: Negative. Skin: Negative for rash. Allergic/Immunologic: Negative. Neurological: Negative. Negative for dizziness, weakness and numbness. Hematological: Negative. Negative for adenopathy. Does not bruise/bleed easily. Psychiatric/Behavioral: Negative for sleep disturbance, dysphoric mood and  decreased concentration. The patient is not nervous/anxious. Objective:     Physical Exam:     Nursing note and vitals reviewed. /68   Pulse 114   Resp 16   Ht 5' 2.01\" (1.575 m)   Wt 135 lb (61.2 kg)   LMP 03/14/2013   Breastfeeding? No   BMI 24.69 kg/m²   Constitutional: She is oriented to person, place, and time. She   appears well-developed and well-nourished.   HENT:   Head: Normocephalic and atraumatic. Right Ear: External ear normal. Tympanic membrane is not erythematous. No middle ear effusion. Left Ear: External ear normal. Tympanic membrane is not erythematous. No middle ear effusion. Nose: No mucosal edema. Mouth/Throat: Oropharynx is clear and moist. No posterior oropharyngeal erythema. Eyes: Conjunctivae and EOM are normal. Pupils are equal, round, and reactive to light. Neck: Normal range of motion. Neck supple. No thyromegaly present. Cardiovascular: Normal rate, regular rhythm and normal heart sounds. No murmur heard. Pulmonary/Chest: Effort normal and breath sounds normal. She has no wheezes. Shehas no rales. Abdominal: Soft. Bowel sounds are normal. She exhibits no distension and no mass. There is no tenderness. There is no rebound and no guarding. Genitourinary/Anorectal:deferred  Musculoskeletal: Normal range of motion. She exhibits no edema or tenderness is present in the right knee and she has decreased range of motion. Lymphadenopathy: She has no cervical adenopathy. Neurological: She is alert and oriented to person, place, and time. She has normal reflexes. Skin: Skin is warm and dry. No rash noted. Psychiatric: She has a normal mood and affect. Her   behavior is normal.       Assessment:      1. Neuropathy    2. Chronic pain of right knee          Plan:      Call or return to clinic prn if these symptoms worsen or fail to improve as anticipated. I have reviewed the instructions with the patient, answering all questions to her satisfaction. No follow-ups on file.   Orders Placed This Encounter   Procedures   Naseem Del Rosario MD, Neurology, [facility]     Referral Priority:   Routine     Referral Type:   Eval and Treat     Referral Reason:   Specialty Services Required     Referred to Provider:   Feng Watson MD     Requested Specialty:   Neurology     Number of Visits Requested:   1     No orders of the defined types were placed in this encounter.     Also trying get a referral to the [facility] clinic to a Dr. Manuela Lees  Electronically signed by Tyler Meadows DO on 6/6/2019 at 1:08 PM

## 2019-06-27 ENCOUNTER — OFFICE VISIT (OUTPATIENT)
Dept: NEUROLOGY | Age: 54
End: 2019-06-27
Payer: COMMERCIAL

## 2019-06-27 VITALS
BODY MASS INDEX: 24.92 KG/M2 | SYSTOLIC BLOOD PRESSURE: 128 MMHG | DIASTOLIC BLOOD PRESSURE: 80 MMHG | WEIGHT: 135.4 LBS | HEART RATE: 128 BPM | HEIGHT: 62 IN

## 2019-06-27 DIAGNOSIS — R20.8 DYSESTHESIA: ICD-10-CM

## 2019-06-27 DIAGNOSIS — F32.89 OTHER DEPRESSION: Primary | ICD-10-CM

## 2019-06-27 PROBLEM — F32.A DEPRESSION: Status: ACTIVE | Noted: 2019-06-27

## 2019-06-27 PROCEDURE — 99204 OFFICE O/P NEW MOD 45 MIN: CPT | Performed by: NURSE PRACTITIONER

## 2019-06-27 RX ORDER — AMITRIPTYLINE HYDROCHLORIDE 25 MG/1
75 TABLET, FILM COATED ORAL NIGHTLY
Qty: 90 TABLET | Refills: 5 | Status: SHIPPED | OUTPATIENT
Start: 2019-06-27 | End: 2019-12-02 | Stop reason: ALTCHOICE

## 2019-06-27 NOTE — PROGRESS NOTES
[facility]            [address]          [place], [address] [facility]          Dept: [phone]          Dept Fax: [phone]        MD Nomna Orlando MD Ahmed B. Bethann Laine, MD Lawanna Brunet, MD Christean Fleet, MD Dail Bramble, RACHELLE         New Patient Consultation    6/27/2019    HISTORY OF PRESENT ILLNESS:       I had the pleasure of seeing Linnea Ochoa for evaluation of pain in the right knee. She is a 70-year-old woman who began having problems with pain in her right knee in September 2013. She had an initial hyperextension injury of her knee and then a reoccurrence. Finally when the symptoms had not resolved, she was referred to orthopedic surgery who did arthroscopic surgery on March 13 of 2014. There was a spur suspected ligament tear, but with surgery, nothing was found. She then underwent several sessions of physical therapy including aqua therapy. She had several x-rays completed of her knee as well as an MRI of her knee and low back. She has had several cortisone injections in her knee that had provided some relief. She then had an right lumbar sympathectomy and September 2015 by Dr. Natalie Morales with pain management which also provided relief. She then had a genicular ablation also completed by pain management that relieved her from having 24/7 pain to tolerable pain. She has had nerve blocks in her right knee and her right thigh completed by the [address] [place] [address],[address] and she has had radiofrequency ablations in February 2016 in May 2017. She had another opinion by Dr. Eliazar Cabrera at the [facility] and I also Dr. Erik Pratt from orthopedics at the [facility]. His medications include Norco, Percocet, meloxicam, gabapentin, tramadol, Nucynta, lidocaine patch, bupropion, citalopram, and Trilintix. None of these medications provided relief of her pain.   Her pain is in her knee area which is a burning gnawing pain. She will see muscle spasms occurring in the knee area especially on the lateral aspect. And had an EMG/NCV study on January 16, 2019 which was normal.            PAST MEDICAL HISTORY:         Diagnosis Date    Depression 6/27/2019    Hyperlipidemia     Tachycardia         PAST SURGICAL HISTORY:         Procedure Laterality Date    WISDOM TOOTH EXTRACTION          SOCIAL HISTORY:     Social History     Socioeconomic History    Marital status:      Spouse name: Not on file    Number of children: Not on file    Years of education: Not on file    Highest education level: Not on file   Occupational History    Not on file   Social Needs    Financial resource strain: Not on file    Food insecurity:     Worry: Not on file     Inability: Not on file    Transportation needs:     Medical: Not on file     Non-medical: Not on file   Tobacco Use    Smoking status: Current Every Day Smoker     Packs/day: 0.80     Years: 20.00     Pack years: 16.00     Types: Cigarettes    Smokeless tobacco: Never Used   Substance and Sexual Activity    Alcohol use:  Yes     Alcohol/week: 0.0 oz     Comment: rarely    Drug use: No    Sexual activity: Not on file   Lifestyle    Physical activity:     Days per week: Not on file     Minutes per session: Not on file    Stress: Not on file   Relationships    Social connections:     Talks on phone: Not on file     Gets together: Not on file     Attends [employer] service: Not on file     Active member of club or organization: Not on file     Attends meetings of clubs or organizations: Not on file     Relationship status: Not on file    Intimate partner violence:     Fear of current or ex partner: Not on file     Emotionally abused: Not on file     Physically abused: Not on file     Forced sexual activity: Not on file   Other Topics Concern    Not on file   Social History Narrative    Not on file       CURRENT MEDICATIONS: Current Outpatient Medications   Medication Sig Dispense Refill    amitriptyline (ELAVIL) 25 MG tablet Take 3 tablets by mouth nightly 90 tablet 5    simvastatin (ZOCOR) 40 MG tablet TAKE ONE TABLET BY MOUTH EVERY NIGHT AT BEDTIME 30 tablet 9    naproxen (NAPROSYN) 500 MG tablet TAKE ONE TABLET BY MOUTH TWICE A DAY WITH FOOD 60 tablet 10    doxyLAMINE succinate (GNP SLEEP AID) 25 MG tablet Take 25 mg by mouth      triamcinolone (NASACORT) 55 MCG/ACT nasal inhaler 110 mcg by Nasal route      Multiple Vitamin (DAILY VITAMIN PO) Take  by mouth.  lidocaine (LIDODERM) 5 % Place 3 patches onto the skin every 24 hours 12 hours on, 12 hours off. 90 patch 0     No current facility-administered medications for this visit.          ALLERGIES:     Allergies   Allergen Reactions    Trazodone And Nefazodone     Nucynta [Tapentadol] Nausea And Vomiting                          REVIEW OF SYSTEMS    CONSTITUTIONAL Weight: absent, Appetite: absent, Fatigue: absent      HEENT Ears: normal, Visual disturbance: absent   RESPIRATORY Shortness of breath: absent, Cough: absent   CARDIOVASCULAR Chest pain: absent, Leg swelling :absent      GI Constipation: absent, Diarrhea: absent, Swallowing change: absent       Urinary frequency: absent, Urinary urgency: absent,    MUSCULOSKELETAL Neck pain: absent, Back pain: absent, Stiffness: absent, Muscle pain: present, Joint pain: present Restless legs: absent   DERMATOLOGIC Hair loss: absent, Skin changes: absent   NEUROLOGIC Memory loss: absent, Confusion: absent, Seizures: absent Trouble walking or imbalance: present, Dizziness: absent, Weakness: absent, Numbness: absent Tremor: absent, Spasm: absent, Speech difficulty: absent, Headache: absent, Light sensitivity: absent   PSYCHIATRIC Anxiety: present, Hallucination: absent, Mood disorder: present   HEMATOLOGIC Abnormal bleeding: absent, Anemia: absent,            PHYSICAL EXAMINATION:       Vitals:    06/27/19 1027   BP: 128/80 Pulse: 128                                              .                                                                                                     General Appearance:  Alert, cooperative, no signs of distress, appears stated age   Head:  Normocephalic, no signs of trauma   Eyes:  Conjunctiva/corneas clear;  eyelids intact   Ears:  Normal external ear and canals   Nose: Nares normal, mucosa normal, no drainage    Throat: Lips and tongue normal; teeth normal;  gums normal   Neck: Supple, intact flexion, extension and rotation;   trachea midline;  no adenopathy;   thyroid: not enlarged;   no carotid pulse abnormality   Back:   Symmetric, no curvature, ROM adequate   Lungs:   Respirations unlabored   Heart:  Regular rate and rhythm           Extremities: Extremities normal, no cyanosis, trace edema right knee without atrophic changes, temperature changes   Pulses: Symmetric over head and neck   Skin: Skin color, texture normal, no rashes, no lesions                                     NEUROLOGIC EXAMINATION    Neurologic Exam    Mental status    Alert and oriented x 3; intact memory with no confusion, speech or language problems; no hallucinations or delusions  Fund of information appropriate for level of education    Cranial nerves    II - visual fields intact to confrontation  III, IV, VI - extra-ocular muscles full: no pupillary defect; no NELIDA, no nystagmus, no ptosis   V - normal facial sensation                                                               VII - normal facial symmetry                                                             VIII - intact hearing                                                                             IX, X - symmetrical palate                                                                  XI - symmetrical shoulder shrug                                                       XII - tongue midline without atrophy or fasciculation      Motor function  Normal muscle bulk and tone; strength 5/5 on all 4 extremities, no pronator drift      Sensory function Intact to light touch, pinprick, vibration, proprioception on all 4 extremities      Cerebellar Intact fine motor movement. No involuntary movements or tremors. No ataxia or dysmetria on finger to nose or heel to shin testing      Reflex function DTR 2+ on bilateral UE and LE, symmetric. Negative Babinski      Gait                   normal base and arm swing              ASSESSMENT AND PLAN:       Thank you very much for the kind referral of Sal Del Angel. I look forward to working with you in the neurological care of your patient. 1. Patient is with right knee pain which is chronic since 2013 following an injury. She had previously been diagnosed with complex regional pain syndrome and undergone several pain management procedures which have improved the pain but not eliminated the pain. She displays no signs at this time of complex regional pain syndrome although continues to have severe pain primarily in the knee and lateral aspect of the knee area. 1. I do not see a need for additional testing at this time. We will start amitriptyline 25 mg at bedtime daily and slowly escalate to 75 mg at bedtime daily. If this is ineffective, we will consider Lyrica  2. Depression secondary to above  1. Referral to a psychologist for counseling. The patient denies any suicidal ideation  2. Patient will return back in 4 to 6 weeks for evaluation of her response to amitriptyline. Signed: Bela Goss CNP  Please note that this chart was generated using voice recognition Dragon dictation software. Although every effort was made to ensure the accuracy of this automated transcription, some errors in transcription may have occurred.

## 2019-07-22 ENCOUNTER — TELEPHONE (OUTPATIENT)
Dept: FAMILY MEDICINE | Age: 54
End: 2019-07-22

## 2019-07-22 RX ORDER — LAMOTRIGINE 25 MG/1
TABLET ORAL
Qty: 120 TABLET | Refills: 0 | Status: SHIPPED | OUTPATIENT
Start: 2019-07-22 | End: 2019-08-05 | Stop reason: DRUGHIGH

## 2019-08-01 ENCOUNTER — OFFICE VISIT (OUTPATIENT)
Dept: NEUROLOGY | Age: 54
End: 2019-08-01
Payer: COMMERCIAL

## 2019-08-01 VITALS
DIASTOLIC BLOOD PRESSURE: 73 MMHG | SYSTOLIC BLOOD PRESSURE: 129 MMHG | HEART RATE: 123 BPM | HEIGHT: 62 IN | WEIGHT: 134.6 LBS | BODY MASS INDEX: 24.77 KG/M2

## 2019-08-01 DIAGNOSIS — R20.8 DYSESTHESIA: Primary | ICD-10-CM

## 2019-08-01 DIAGNOSIS — F32.A DEPRESSION, UNSPECIFIED DEPRESSION TYPE: ICD-10-CM

## 2019-08-01 PROCEDURE — 99214 OFFICE O/P EST MOD 30 MIN: CPT | Performed by: NURSE PRACTITIONER

## 2019-08-05 ENCOUNTER — OFFICE VISIT (OUTPATIENT)
Dept: FAMILY MEDICINE | Age: 54
End: 2019-08-05

## 2019-08-05 VITALS
WEIGHT: 174 LBS | BODY MASS INDEX: 27.31 KG/M2 | HEIGHT: 67 IN | DIASTOLIC BLOOD PRESSURE: 78 MMHG | RESPIRATION RATE: 16 BRPM | OXYGEN SATURATION: 97 % | SYSTOLIC BLOOD PRESSURE: 100 MMHG | HEART RATE: 98 BPM

## 2019-08-05 DIAGNOSIS — F31.81 BIPOLAR 2 DISORDER, MAJOR DEPRESSIVE EPISODE (CMD): Primary | ICD-10-CM

## 2019-08-05 DIAGNOSIS — F33.42 MAJOR DEPRESSIVE DISORDER, RECURRENT EPISODE, IN FULL REMISSION (CMD): ICD-10-CM

## 2019-08-05 DIAGNOSIS — I10 BENIGN ESSENTIAL HYPERTENSION: ICD-10-CM

## 2019-08-05 PROCEDURE — 3074F SYST BP LT 130 MM HG: CPT | Performed by: FAMILY MEDICINE

## 2019-08-05 PROCEDURE — 99214 OFFICE O/P EST MOD 30 MIN: CPT | Performed by: FAMILY MEDICINE

## 2019-08-05 PROCEDURE — 3078F DIAST BP <80 MM HG: CPT | Performed by: FAMILY MEDICINE

## 2019-08-05 RX ORDER — LOSARTAN POTASSIUM 25 MG/1
25 TABLET ORAL DAILY
Qty: 30 TABLET | Refills: 1 | Status: SHIPPED | OUTPATIENT
Start: 2019-08-05 | End: 2019-08-12 | Stop reason: SINTOL

## 2019-08-05 RX ORDER — LAMOTRIGINE 100 MG/1
100 TABLET ORAL DAILY
Qty: 30 TABLET | Refills: 1 | Status: SHIPPED | OUTPATIENT
Start: 2019-08-05 | End: 2020-03-06 | Stop reason: CLARIF

## 2019-08-05 RX ORDER — DESVENLAFAXINE SUCCINATE 50 MG/1
50 TABLET, EXTENDED RELEASE ORAL DAILY
Qty: 30 TABLET | Refills: 1 | Status: SHIPPED | OUTPATIENT
Start: 2019-08-05 | End: 2019-09-28 | Stop reason: SDUPTHER

## 2019-08-05 SDOH — HEALTH STABILITY: MENTAL HEALTH: HOW OFTEN DO YOU HAVE A DRINK CONTAINING ALCOHOL?: NEVER

## 2019-08-05 ASSESSMENT — ENCOUNTER SYMPTOMS
FEVER: 0
CONFUSION: 0
STRIDOR: 0
FATIGUE: 0
BACK PAIN: 0
CHEST TIGHTNESS: 0

## 2019-08-12 ENCOUNTER — TELEPHONE (OUTPATIENT)
Dept: FAMILY MEDICINE | Age: 54
End: 2019-08-12

## 2019-08-12 RX ORDER — HYDROCHLOROTHIAZIDE 25 MG/1
TABLET ORAL
Qty: 90 TABLET | Refills: 1 | Status: SHIPPED | OUTPATIENT
Start: 2019-08-12

## 2019-09-10 DIAGNOSIS — G89.29 CHRONIC PAIN OF RIGHT KNEE: ICD-10-CM

## 2019-09-10 DIAGNOSIS — M25.561 CHRONIC PAIN OF RIGHT KNEE: ICD-10-CM

## 2019-09-10 RX ORDER — NAPROXEN 500 MG/1
TABLET ORAL
Qty: 60 TABLET | Refills: 9 | Status: SHIPPED | OUTPATIENT
Start: 2019-09-10 | End: 2020-06-29

## 2019-09-28 DIAGNOSIS — F33.42 MAJOR DEPRESSIVE DISORDER, RECURRENT EPISODE, IN FULL REMISSION (CMD): ICD-10-CM

## 2019-09-29 RX ORDER — DESVENLAFAXINE SUCCINATE 50 MG/1
TABLET, EXTENDED RELEASE ORAL
Qty: 30 TABLET | Refills: 1 | Status: SHIPPED | OUTPATIENT
Start: 2019-09-29 | End: 2019-11-27 | Stop reason: SDUPTHER

## 2019-09-29 RX ORDER — FLUOXETINE HYDROCHLORIDE 40 MG/1
CAPSULE ORAL
Qty: 30 CAPSULE | Refills: 1 | OUTPATIENT
Start: 2019-09-29

## 2019-10-02 ENCOUNTER — OFFICE VISIT (OUTPATIENT)
Dept: NEUROLOGY | Age: 54
End: 2019-10-02
Payer: COMMERCIAL

## 2019-10-02 VITALS
HEART RATE: 116 BPM | BODY MASS INDEX: 24.55 KG/M2 | WEIGHT: 133.4 LBS | SYSTOLIC BLOOD PRESSURE: 132 MMHG | DIASTOLIC BLOOD PRESSURE: 75 MMHG | HEIGHT: 62 IN

## 2019-10-02 DIAGNOSIS — R20.8 DYSESTHESIA: Primary | ICD-10-CM

## 2019-10-02 DIAGNOSIS — G90.521 COMPLEX REGIONAL PAIN SYNDROME I OF RIGHT LOWER LIMB: ICD-10-CM

## 2019-10-02 PROCEDURE — 99214 OFFICE O/P EST MOD 30 MIN: CPT | Performed by: NURSE PRACTITIONER

## 2019-10-04 ENCOUNTER — APPOINTMENT (OUTPATIENT)
Dept: FAMILY MEDICINE | Age: 54
End: 2019-10-04

## 2019-10-07 DIAGNOSIS — F33.42 MAJOR DEPRESSIVE DISORDER, RECURRENT EPISODE, IN FULL REMISSION (CMD): ICD-10-CM

## 2019-10-07 RX ORDER — FLUOXETINE HYDROCHLORIDE 40 MG/1
CAPSULE ORAL
Qty: 30 CAPSULE | Refills: 1 | OUTPATIENT
Start: 2019-10-07

## 2019-10-14 DIAGNOSIS — F33.42 MAJOR DEPRESSIVE DISORDER, RECURRENT EPISODE, IN FULL REMISSION (CMD): ICD-10-CM

## 2019-10-14 RX ORDER — FLUOXETINE HYDROCHLORIDE 40 MG/1
CAPSULE ORAL
Qty: 30 CAPSULE | Refills: 1 | OUTPATIENT
Start: 2019-10-14

## 2019-10-16 DIAGNOSIS — F33.42 MAJOR DEPRESSIVE DISORDER, RECURRENT EPISODE, IN FULL REMISSION (CMD): ICD-10-CM

## 2019-10-16 RX ORDER — FLUOXETINE HYDROCHLORIDE 40 MG/1
CAPSULE ORAL
Qty: 30 CAPSULE | Refills: 1 | Status: SHIPPED | OUTPATIENT
Start: 2019-10-16 | End: 2019-10-16 | Stop reason: SDUPTHER

## 2019-10-16 RX ORDER — FLUOXETINE HYDROCHLORIDE 40 MG/1
40 CAPSULE ORAL DAILY
Qty: 1 CAPSULE | Refills: 0 | Status: SHIPPED | OUTPATIENT
Start: 2019-10-16 | End: 2019-10-21 | Stop reason: CLARIF

## 2019-10-17 ENCOUNTER — TELEPHONE (OUTPATIENT)
Dept: FAMILY MEDICINE | Age: 54
End: 2019-10-17

## 2019-10-21 ENCOUNTER — OFFICE VISIT (OUTPATIENT)
Dept: FAMILY MEDICINE | Age: 54
End: 2019-10-21

## 2019-10-21 VITALS
RESPIRATION RATE: 16 BRPM | BODY MASS INDEX: 27.15 KG/M2 | DIASTOLIC BLOOD PRESSURE: 66 MMHG | WEIGHT: 173 LBS | OXYGEN SATURATION: 100 % | SYSTOLIC BLOOD PRESSURE: 118 MMHG | HEART RATE: 76 BPM | HEIGHT: 67 IN

## 2019-10-21 DIAGNOSIS — F41.1 GENERALIZED ANXIETY DISORDER: ICD-10-CM

## 2019-10-21 DIAGNOSIS — F31.81 BIPOLAR 2 DISORDER, MAJOR DEPRESSIVE EPISODE (CMD): Primary | ICD-10-CM

## 2019-10-21 DIAGNOSIS — F33.42 MAJOR DEPRESSIVE DISORDER, RECURRENT EPISODE, IN FULL REMISSION (CMD): ICD-10-CM

## 2019-10-21 PROCEDURE — 99213 OFFICE O/P EST LOW 20 MIN: CPT | Performed by: FAMILY MEDICINE

## 2019-10-21 ASSESSMENT — ENCOUNTER SYMPTOMS
NERVOUS/ANXIOUS: 1
FATIGUE: 0
CONFUSION: 0
STRIDOR: 0
BACK PAIN: 0
FEVER: 0
CHEST TIGHTNESS: 0

## 2019-11-19 ENCOUNTER — OFFICE VISIT (OUTPATIENT)
Dept: FAMILY MEDICINE | Age: 54
End: 2019-11-19

## 2019-11-19 VITALS
HEIGHT: 67 IN | SYSTOLIC BLOOD PRESSURE: 116 MMHG | HEART RATE: 82 BPM | BODY MASS INDEX: 27 KG/M2 | WEIGHT: 172 LBS | RESPIRATION RATE: 16 BRPM | DIASTOLIC BLOOD PRESSURE: 70 MMHG | OXYGEN SATURATION: 100 %

## 2019-11-19 DIAGNOSIS — F31.81 BIPOLAR 2 DISORDER, MAJOR DEPRESSIVE EPISODE (CMD): ICD-10-CM

## 2019-11-19 DIAGNOSIS — Z00.00 ANNUAL PHYSICAL EXAM: Primary | ICD-10-CM

## 2019-11-19 DIAGNOSIS — I10 BENIGN ESSENTIAL HYPERTENSION: ICD-10-CM

## 2019-11-19 DIAGNOSIS — F41.1 GENERALIZED ANXIETY DISORDER: ICD-10-CM

## 2019-11-19 DIAGNOSIS — F33.42 MAJOR DEPRESSIVE DISORDER, RECURRENT EPISODE, IN FULL REMISSION (CMD): ICD-10-CM

## 2019-11-19 PROCEDURE — 3074F SYST BP LT 130 MM HG: CPT | Performed by: FAMILY MEDICINE

## 2019-11-19 PROCEDURE — 99396 PREV VISIT EST AGE 40-64: CPT | Performed by: FAMILY MEDICINE

## 2019-11-19 PROCEDURE — 3078F DIAST BP <80 MM HG: CPT | Performed by: FAMILY MEDICINE

## 2019-11-19 SDOH — HEALTH STABILITY: MENTAL HEALTH: HOW OFTEN DO YOU HAVE A DRINK CONTAINING ALCOHOL?: NEVER

## 2019-11-19 ASSESSMENT — ENCOUNTER SYMPTOMS
STRIDOR: 0
BACK PAIN: 0
CHEST TIGHTNESS: 0
FEVER: 0
CONFUSION: 0
FATIGUE: 0

## 2019-11-27 RX ORDER — DESVENLAFAXINE SUCCINATE 50 MG/1
TABLET, EXTENDED RELEASE ORAL
Qty: 30 TABLET | Refills: 1 | Status: SHIPPED | OUTPATIENT
Start: 2019-11-27 | End: 2020-01-21 | Stop reason: SDUPTHER

## 2019-11-27 RX ORDER — MINOCYCLINE HYDROCHLORIDE 50 MG/1
CAPSULE ORAL
Qty: 60 CAPSULE | Refills: 5 | Status: SHIPPED | OUTPATIENT
Start: 2019-11-27 | End: 2020-06-05 | Stop reason: CLARIF

## 2019-12-02 ENCOUNTER — OFFICE VISIT (OUTPATIENT)
Dept: NEUROLOGY | Age: 54
End: 2019-12-02
Payer: COMMERCIAL

## 2019-12-02 VITALS
WEIGHT: 136.2 LBS | SYSTOLIC BLOOD PRESSURE: 139 MMHG | BODY MASS INDEX: 25.06 KG/M2 | DIASTOLIC BLOOD PRESSURE: 75 MMHG | HEIGHT: 62 IN | HEART RATE: 118 BPM

## 2019-12-02 DIAGNOSIS — G90.521 COMPLEX REGIONAL PAIN SYNDROME I OF RIGHT LOWER LIMB: ICD-10-CM

## 2019-12-02 DIAGNOSIS — R20.8 DYSESTHESIA: ICD-10-CM

## 2019-12-02 PROCEDURE — 99214 OFFICE O/P EST MOD 30 MIN: CPT | Performed by: NURSE PRACTITIONER

## 2019-12-02 RX ORDER — PREGABALIN 25 MG/1
50 CAPSULE ORAL 2 TIMES DAILY
Qty: 120 CAPSULE | Refills: 5 | Status: SHIPPED | OUTPATIENT
Start: 2019-12-02 | End: 2019-12-18 | Stop reason: SDUPTHER

## 2019-12-06 DIAGNOSIS — G90.521 COMPLEX REGIONAL PAIN SYNDROME I OF RIGHT LOWER LIMB: ICD-10-CM

## 2019-12-18 RX ORDER — PREGABALIN 50 MG/1
50 CAPSULE ORAL 2 TIMES DAILY
Qty: 60 CAPSULE | Refills: 2 | Status: SHIPPED | OUTPATIENT
Start: 2019-12-18 | End: 2020-02-03

## 2019-12-20 DIAGNOSIS — G90.521 COMPLEX REGIONAL PAIN SYNDROME I OF RIGHT LOWER LIMB: Primary | ICD-10-CM

## 2019-12-22 RX ORDER — PREGABALIN 25 MG/1
25 CAPSULE ORAL 3 TIMES DAILY
Qty: 90 CAPSULE | Refills: 0 | OUTPATIENT
Start: 2019-12-22 | End: 2020-02-03

## 2020-01-22 RX ORDER — DESVENLAFAXINE SUCCINATE 50 MG/1
TABLET, EXTENDED RELEASE ORAL
Qty: 30 TABLET | Refills: 0 | Status: SHIPPED | OUTPATIENT
Start: 2020-01-22 | End: 2020-02-24 | Stop reason: SDUPTHER

## 2020-02-03 ENCOUNTER — OFFICE VISIT (OUTPATIENT)
Dept: NEUROLOGY | Age: 55
End: 2020-02-03
Payer: COMMERCIAL

## 2020-02-03 VITALS
WEIGHT: 137 LBS | HEIGHT: 62 IN | BODY MASS INDEX: 25.21 KG/M2 | DIASTOLIC BLOOD PRESSURE: 75 MMHG | SYSTOLIC BLOOD PRESSURE: 126 MMHG | HEART RATE: 115 BPM

## 2020-02-03 PROCEDURE — 99214 OFFICE O/P EST MOD 30 MIN: CPT | Performed by: NURSE PRACTITIONER

## 2020-02-03 RX ORDER — GABAPENTIN 100 MG/1
200 CAPSULE ORAL 3 TIMES DAILY
Qty: 180 CAPSULE | Refills: 5 | Status: SHIPPED | OUTPATIENT
Start: 2020-02-03 | End: 2020-06-29

## 2020-02-03 NOTE — PROGRESS NOTES
oriented x 3; intact memory with no confusion, speech or language problems; no hallucinations or delusions  Fund of information appropriate for level of education    Cranial nerves    II - visual fields intact to confrontation bilaterally  III, IV, VI - extra-ocular muscles full: no pupillary defect; no NELIDA, no nystagmus, no ptosis   V - normal facial sensation                                                               VII - normal facial symmetry                                                             VIII - intact hearing                                                                             IX, X - symmetrical palate                                                                  XI - symmetrical shoulder shrug                                                       XII - tongue midline without atrophy or fasciculation      Motor function  Normal muscle bulk and tone; strength 5/5 on all 4 extremities, no pronator drift      Sensory function Intact to light touch, pinprick, vibration, proprioception on all 4 extremities      Cerebellar Intact fine motor movement. No involuntary movements or tremors. No ataxia or dysmetria on finger to nose or heel to shin testing      Reflex function DTR 2+ on bilateral UE and LE, symmetric. Negative Babinski      Gait                   normal base and arm swing                  ASSESSMENT AND PLAN:           In summary, your patient, Kaveh Almendarez exhibits the following, with associated plan:    1. Dysesthesias of the right knee which is chronic since an injury in 2013. She was previously diagnosed with complex regional pain syndrome and underwent several pain management procedures which have improved the pain, but not eliminated the pain. She did have a response to amitriptyline as well as Lyrica, however the side effects outweighed the benefits. 1. Start gabapentin 100 mg 3 times daily and titrate to 200 mg 3 times daily.   2. Referral to pain management Dr. Suri Aly

## 2020-02-04 ENCOUNTER — TELEPHONE (OUTPATIENT)
Dept: NEUROLOGY | Age: 55
End: 2020-02-04

## 2020-02-07 ENCOUNTER — TELEPHONE (OUTPATIENT)
Dept: NEUROLOGY | Age: 55
End: 2020-02-07

## 2020-02-07 NOTE — TELEPHONE ENCOUNTER
Gerardo Burrell called and said the the Pain Management office never got our referral for her. We have it documented that it was faxed so I called and spoke to Axel Courtney and she still can't find anything from us. I printed the referral, the last two office notes and the demographics. I faxed the packet to [phone] - 33 pages. I then called Axel Courtney and verified that she received my fax and she did. I called Gerardo Burrell and told her what I did and she was very thankful.

## 2020-02-13 ENCOUNTER — TELEPHONE (OUTPATIENT)
Dept: FAMILY MEDICINE CLINIC | Age: 55
End: 2020-02-13

## 2020-02-13 NOTE — TELEPHONE ENCOUNTER
Left voicemail to reschedule appointment on 02/21/2020 due to provider being out of office, letter mailed out.

## 2020-02-20 ENCOUNTER — OFFICE VISIT (OUTPATIENT)
Dept: FAMILY MEDICINE CLINIC | Age: 55
End: 2020-02-20
Payer: COMMERCIAL

## 2020-02-20 VITALS
WEIGHT: 135 LBS | HEIGHT: 62 IN | SYSTOLIC BLOOD PRESSURE: 123 MMHG | RESPIRATION RATE: 16 BRPM | BODY MASS INDEX: 24.84 KG/M2 | HEART RATE: 125 BPM | DIASTOLIC BLOOD PRESSURE: 73 MMHG | OXYGEN SATURATION: 97 %

## 2020-02-20 PROCEDURE — 90686 IIV4 VACC NO PRSV 0.5 ML IM: CPT | Performed by: FAMILY MEDICINE

## 2020-02-20 PROCEDURE — 90732 PPSV23 VACC 2 YRS+ SUBQ/IM: CPT | Performed by: FAMILY MEDICINE

## 2020-02-20 PROCEDURE — 99214 OFFICE O/P EST MOD 30 MIN: CPT | Performed by: FAMILY MEDICINE

## 2020-02-20 PROCEDURE — 90472 IMMUNIZATION ADMIN EACH ADD: CPT | Performed by: FAMILY MEDICINE

## 2020-02-20 PROCEDURE — 90471 IMMUNIZATION ADMIN: CPT | Performed by: FAMILY MEDICINE

## 2020-02-20 ASSESSMENT — ENCOUNTER SYMPTOMS
EYE PAIN: 0
BACK PAIN: 1
SHORTNESS OF BREATH: 0
BLOOD IN STOOL: 0

## 2020-02-20 NOTE — PROGRESS NOTES
Felipe Nunn MD  [place] [address] [facility]  [id number] [address]  Dept: [phone]    Araceli Boyle is a 47 y.o. female who presents today for hermedical conditions/complaints as noted below.   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 Karl Arellano, seeing him next week, in the past has been on Norco, Percocet, meloxicam, gabapentin, tramadol, Lyrica, Nucynta, lidocaine patch, bupropion, citalopram; her neurologist started her on gabapentin 2/3, she is currently taking just 1 pill and slowly titrating herself up, the gabapentin has not really been helping her back pain    Longstanding history of tachycardia and palpitations, had a Holter and echo done in 2013 that was unremarkable, last blood work was in 2018, no obvious trigger for her palpitations, she does not monitor her heart rate at home but states it is always elevated during her doctor's appointments, no chest pain, requesting to see cardiology    Lesion at her right posterior neck that has been present for over a year, sometimes drains on its own, she like to see dermatology for removal    BP Readings from Last 3 Encounters:   02/20/20 123/73   02/03/20 126/75   12/02/19 139/75       Patient Active Problem List   Diagnosis    Hyperlipidemia    Cervical pain (neck)    Hypercholesterolemia    Dysesthesia    Depression    Complex regional pain syndrome i of right lower limb       Past Medical History:   Diagnosis Date    Complex regional pain syndrome i of right lower limb 10/2/2019    Depression 6/27/2019    Hyperlipidemia     Tachycardia      Past Surgical History:   Procedure Laterality Date    WISDOM TOOTH EXTRACTION       Family History   Adopted: Yes   Family history unknown: Yes     Social History     Tobacco Use    Smoking status: Current Every Day Smoker     Packs/day: 0.50     Years: 20.00     Pack years: 10.00     Types: Cigarettes    Smokeless tobacco: Never Used   Substance Use Topics    Alcohol use: Yes     Alcohol/week: 0.0 standard drinks     Comment: rarely    Drug use: No       Current Outpatient Medications:     gabapentin (NEURONTIN) 100 MG capsule, Take 2 capsules by mouth 3 times daily for 180 days. Intended supply: 30 days, Disp: 180 capsule, Rfl: 5    naproxen (NAPROSYN) 500 MG tablet, TAKE ONE TABLET BY MOUTH TWICE A DAY WITH FOOD (Patient taking differently: daily ), Disp: 60 tablet, Rfl: 9    simvastatin (ZOCOR) 40 MG tablet, TAKE ONE TABLET BY MOUTH EVERY NIGHT AT BEDTIME, Disp: 30 tablet, Rfl: 9    doxyLAMINE succinate (GNP SLEEP AID) 25 MG tablet, Take 25 mg by mouth, Disp: , Rfl:     triamcinolone (NASACORT) 55 MCG/ACT nasal inhaler, 110 mcg by Nasal route as needed , Disp: , Rfl:     Multiple Vitamin (DAILY VITAMIN PO), Take  by mouth., Disp: , Rfl:     Subjective:     Review of Systems   Constitutional: Negative for appetite change, diaphoresis, fever and unexpected weight change. HENT: Negative for ear pain. Eyes: Negative for pain. Respiratory: Negative for shortness of breath. Cardiovascular: Positive for palpitations. Negative for chest pain. Gastrointestinal: Negative for blood in stool. Musculoskeletal: Positive for back pain, gait problem and myalgias. Skin: Negative for pallor. Neurological: Negative for seizures. Psychiatric/Behavioral: Negative for dysphoric mood and suicidal ideas.        Objective:     /73   Pulse 125 Resp 16   Ht 5' 2\" (1.575 m)   Wt 135 lb (61.2 kg)   LMP 03/14/2013   SpO2 97%   BMI 24.69 kg/m²     Physical Exam  Constitutional:       Appearance: Normal appearance. She is well-developed. She is not ill-appearing, toxic-appearing or diaphoretic. HENT:      Head: Normocephalic. Eyes:      Conjunctiva/sclera: Conjunctivae normal.   Neck:      Musculoskeletal: Normal range of motion. No neck rigidity. Cardiovascular:      Rate and Rhythm: Normal rate. Heart sounds: Normal heart sounds. No murmur. Pulmonary:      Effort: Pulmonary effort is normal. No respiratory distress. Breath sounds: Normal breath sounds. No wheezing. Abdominal:      General: There is no distension. Palpations: Abdomen is soft. Tenderness: There is no abdominal tenderness. There is no guarding or rebound. Musculoskeletal:      Thoracic back: She exhibits tenderness. She exhibits no edema, no deformity and no spasm. Lymphadenopathy:      Cervical: No cervical adenopathy. Skin:     Comments: Subcentimeter skin colored raised bump with central black dot at right posterior neck   Neurological:      Mental Status: She is alert. Psychiatric:         Mood and Affect: Mood normal.         Behavior: Behavior normal.         Thought Content: Thought content normal.         Judgment: Judgment normal.         Assessment & Plan:      1. Tachycardia  2. Palpitations  Blood work ordered, she is requesting to see cardiology, I suspect sinus tachycardia that could be secondary to her chronic pain given her negative previous work-up  - [facility] - Ceci Smith MD, Cardiology, [facility] with Reflex; Future    3. Skin lesion of neck  - Rosa James MD, Dermatology, [facility]    4. Chronic midline thoracic back pain  Recommended physical therapy, stretching exercises, avoiding poor posture while working, x-ray ordered of the thoracic spine.   She was started on gabapentin for her right leg issues, hopefully as she titrates up the dose this will help her back as well. - [facility] [place]  - XR THORACIC SPINE (2 VIEWS); Future    5. Complex regional pain syndrome of right lower limb  Continue follow-up with neurology and pain management    6. Routine general medical examination at a health care facility  - CBC Auto Differential; Future  - Basic Metabolic Panel; Future  - Hemoglobin A1C; Future  - Lipid Panel; Future  - TSH with Reflex; Future    7. Pure hypercholesterolemia  - Lipid Panel; Future    8. Need for influenza vaccination  - INFLUENZA, QUADV, 3 YRS AND OLDER, IM PF, PREFILL SYR OR SDV, 0.5ML (AFLURIA QUADV, PF)    9. Need for pneumococcal vaccination  - Pneumococcal polysaccharide vaccine 23-valent greater than or equal to 1yo subcutaneous/IM    Call or return to clinic prn if these symptoms worsen or fail to improve as anticipated. I have reviewed the instructions with the patient, answering all questions to their satisfaction.     Electronically signed by Nicholas Chilel MD on 2/20/2020 at 12:22 PM

## 2020-02-24 ENCOUNTER — TELEPHONE (OUTPATIENT)
Dept: FAMILY MEDICINE CLINIC | Age: 55
End: 2020-02-24

## 2020-02-24 RX ORDER — DESVENLAFAXINE SUCCINATE 50 MG/1
TABLET, EXTENDED RELEASE ORAL
Qty: 30 TABLET | Refills: 3 | Status: SHIPPED | OUTPATIENT
Start: 2020-02-24 | End: 2020-06-05 | Stop reason: SDUPTHER

## 2020-02-24 NOTE — TELEPHONE ENCOUNTER
Patient called and mentioned she would like another PT referral that accepts her insurance, the current referral does not take her insurance. Please advise.

## 2020-02-25 LAB
AVERAGE GLUCOSE: 114
BASOPHILS ABSOLUTE: 0.1 /ΜL
BASOPHILS RELATIVE PERCENT: 1 %
BUN BLDV-MCNC: 18 MG/DL
CALCIUM SERPL-MCNC: 9.8 MG/DL
CHLORIDE BLD-SCNC: 106 MMOL/L
CHOLESTEROL, TOTAL: 227 MG/DL
CHOLESTEROL/HDL RATIO: 5.8
CO2: 23 MMOL/L
CREAT SERPL-MCNC: 0.74 MG/DL
EOSINOPHILS ABSOLUTE: 0.3 /ΜL
EOSINOPHILS RELATIVE PERCENT: 3.1 %
GFR CALCULATED: NORMAL
GLUCOSE BLD-MCNC: 91 MG/DL
HBA1C MFR BLD: 5.6 %
HCT VFR BLD CALC: 40.4 % (ref 36–46)
HDLC SERPL-MCNC: 39 MG/DL (ref 35–70)
HEMOGLOBIN: 13.8 G/DL (ref 12–16)
LDL CHOLESTEROL CALCULATED: 140 MG/DL (ref 0–160)
LYMPHOCYTES ABSOLUTE: 3 /ΜL
LYMPHOCYTES RELATIVE PERCENT: 29.5 %
MCH RBC QN AUTO: 29.9 PG
MCHC RBC AUTO-ENTMCNC: 34.2 G/DL
MCV RBC AUTO: 88 FL
MONOCYTES ABSOLUTE: 0.6 /ΜL
MONOCYTES RELATIVE PERCENT: 5.8 %
NEUTROPHILS ABSOLUTE: 6.2 /ΜL
NEUTROPHILS RELATIVE PERCENT: 60.6 %
PDW BLD-RTO: 13.6 %
PLATELET # BLD: 426 K/ΜL
PMV BLD AUTO: 7.7 FL
POTASSIUM SERPL-SCNC: 3.8 MMOL/L
RBC # BLD: 4.62 10^6/ΜL
SODIUM BLD-SCNC: 142 MMOL/L
TRIGL SERPL-MCNC: 240 MG/DL
TSH SERPL DL<=0.05 MIU/L-ACNC: 1.93 UIU/ML
VLDLC SERPL CALC-MCNC: 48 MG/DL
WBC # BLD: 10.2 10^3/ML

## 2020-02-27 RX ORDER — ICOSAPENT ETHYL 1000 MG/1
2 CAPSULE ORAL 2 TIMES DAILY WITH MEALS
Qty: 120 CAPSULE | Refills: 5 | Status: SHIPPED | OUTPATIENT
Start: 2020-02-27 | End: 2020-06-29

## 2020-02-27 RX ORDER — SIMVASTATIN 80 MG
TABLET ORAL
Qty: 30 TABLET | Refills: 5 | Status: SHIPPED | OUTPATIENT
Start: 2020-02-27 | End: 2021-04-19

## 2020-03-03 ENCOUNTER — OFFICE VISIT (OUTPATIENT)
Dept: DERMATOLOGY | Age: 55
End: 2020-03-03
Payer: COMMERCIAL

## 2020-03-03 VITALS
BODY MASS INDEX: 24.95 KG/M2 | SYSTOLIC BLOOD PRESSURE: 138 MMHG | DIASTOLIC BLOOD PRESSURE: 72 MMHG | OXYGEN SATURATION: 98 % | WEIGHT: 135.6 LBS | HEIGHT: 62 IN | HEART RATE: 100 BPM

## 2020-03-03 PROCEDURE — 99202 OFFICE O/P NEW SF 15 MIN: CPT | Performed by: DERMATOLOGY

## 2020-03-06 ENCOUNTER — OFFICE VISIT (OUTPATIENT)
Dept: FAMILY MEDICINE | Age: 55
End: 2020-03-06

## 2020-03-06 VITALS
BODY MASS INDEX: 26.06 KG/M2 | WEIGHT: 166 LBS | OXYGEN SATURATION: 100 % | HEIGHT: 67 IN | HEART RATE: 72 BPM | DIASTOLIC BLOOD PRESSURE: 60 MMHG | SYSTOLIC BLOOD PRESSURE: 104 MMHG | RESPIRATION RATE: 16 BRPM

## 2020-03-06 DIAGNOSIS — Z00.00 ANNUAL PHYSICAL EXAM: ICD-10-CM

## 2020-03-06 DIAGNOSIS — F31.81 BIPOLAR 2 DISORDER, MAJOR DEPRESSIVE EPISODE (CMD): Primary | ICD-10-CM

## 2020-03-06 DIAGNOSIS — F33.42 MAJOR DEPRESSIVE DISORDER, RECURRENT EPISODE, IN FULL REMISSION (CMD): ICD-10-CM

## 2020-03-06 PROCEDURE — 99213 OFFICE O/P EST LOW 20 MIN: CPT | Performed by: FAMILY MEDICINE

## 2020-03-06 SDOH — HEALTH STABILITY: MENTAL HEALTH: HOW OFTEN DO YOU HAVE A DRINK CONTAINING ALCOHOL?: NEVER

## 2020-03-06 ASSESSMENT — ENCOUNTER SYMPTOMS
FEVER: 0
CHEST TIGHTNESS: 0
BACK PAIN: 0
STRIDOR: 0
FATIGUE: 0
CONFUSION: 0

## 2020-05-26 ENCOUNTER — LAB SERVICES (OUTPATIENT)
Dept: FAMILY MEDICINE | Age: 55
End: 2020-05-26

## 2020-05-26 DIAGNOSIS — Z00.00 ANNUAL PHYSICAL EXAM: ICD-10-CM

## 2020-05-26 PROCEDURE — 36415 COLL VENOUS BLD VENIPUNCTURE: CPT

## 2020-05-26 PROCEDURE — 80050 GENERAL HEALTH PANEL: CPT | Performed by: FAMILY MEDICINE

## 2020-05-26 PROCEDURE — 80061 LIPID PANEL: CPT | Performed by: FAMILY MEDICINE

## 2020-05-26 PROCEDURE — 81001 URINALYSIS AUTO W/SCOPE: CPT | Performed by: FAMILY MEDICINE

## 2020-05-27 LAB
ALBUMIN SERPL-MCNC: 3.7 G/DL (ref 3.6–5.1)
ALBUMIN/GLOB SERPL: 1.2 {RATIO} (ref 1–2.4)
ALP SERPL-CCNC: 60 UNITS/L (ref 45–117)
ALT SERPL-CCNC: 17 UNITS/L
AMORPH SED URNS QL MICRO: PRESENT
ANION GAP SERPL CALC-SCNC: 11 MMOL/L (ref 10–20)
APPEARANCE UR: ABNORMAL
AST SERPL-CCNC: 21 UNITS/L
BACTERIA #/AREA URNS HPF: ABNORMAL /HPF
BASOPHILS # BLD: 0 K/MCL (ref 0–0.3)
BASOPHILS NFR BLD: 1 %
BILIRUB SERPL-MCNC: 0.5 MG/DL (ref 0.2–1)
BILIRUB UR QL STRIP: NEGATIVE
BUN SERPL-MCNC: 23 MG/DL (ref 6–20)
BUN/CREAT SERPL: 35 (ref 7–25)
CALCIUM SERPL-MCNC: 9.2 MG/DL (ref 8.4–10.2)
CHLORIDE SERPL-SCNC: 108 MMOL/L (ref 98–107)
CHOLEST SERPL-MCNC: 276 MG/DL
CHOLEST/HDLC SERPL: 3.8 {RATIO}
CO2 SERPL-SCNC: 28 MMOL/L (ref 21–32)
COLOR UR: ABNORMAL
CREAT SERPL-MCNC: 0.66 MG/DL (ref 0.51–0.95)
DIFFERENTIAL METHOD BLD: ABNORMAL
EOSINOPHIL # BLD: 0.2 K/MCL (ref 0.1–0.5)
EOSINOPHIL NFR BLD: 5 %
ERYTHROCYTE [DISTWIDTH] IN BLOOD: 13.5 % (ref 11–15)
GLOBULIN SER-MCNC: 3.2 G/DL (ref 2–4)
GLUCOSE SERPL-MCNC: 89 MG/DL (ref 65–99)
GLUCOSE UR STRIP-MCNC: NEGATIVE MG/DL
HCT VFR BLD CALC: 43.7 % (ref 36–46.5)
HDLC SERPL-MCNC: 73 MG/DL
HGB BLD-MCNC: 13.9 G/DL (ref 12–15.5)
HGB UR QL STRIP: NEGATIVE
HYALINE CASTS #/AREA URNS LPF: ABNORMAL /LPF (ref 0–5)
IMM GRANULOCYTES # BLD AUTO: 0 K/MCL (ref 0–0.2)
IMM GRANULOCYTES NFR BLD: 0 %
KETONES UR STRIP-MCNC: NEGATIVE MG/DL
LDLC SERPL CALC-MCNC: 181 MG/DL
LENGTH OF FAST TIME PATIENT: 12 HRS
LENGTH OF FAST TIME PATIENT: 12 HRS
LEUKOCYTE ESTERASE UR QL STRIP: ABNORMAL
LYMPHOCYTES # BLD: 1.4 K/MCL (ref 1–4)
LYMPHOCYTES NFR BLD: 31 %
MCH RBC QN AUTO: 29.1 PG (ref 26–34)
MCHC RBC AUTO-ENTMCNC: 31.8 G/DL (ref 32–36.5)
MCV RBC AUTO: 91.6 FL (ref 78–100)
MONOCYTES # BLD: 0.7 K/MCL (ref 0.3–0.9)
MONOCYTES NFR BLD: 15 %
MUCOUS THREADS URNS QL MICRO: PRESENT
NEUTROPHILS # BLD: 2.2 K/MCL (ref 1.8–7.7)
NEUTROPHILS NFR BLD: 48 %
NITRITE UR QL STRIP: NEGATIVE
NONHDLC SERPL-MCNC: 203 MG/DL
NRBC BLD MANUAL-RTO: 0 /100 WBC
PH UR STRIP: 5 UNITS (ref 5–7)
PLATELET # BLD: 317 K/MCL (ref 140–450)
POTASSIUM SERPL-SCNC: 4.1 MMOL/L (ref 3.4–5.1)
PROT SERPL-MCNC: 6.9 G/DL (ref 6.4–8.2)
PROT UR STRIP-MCNC: 30 MG/DL
RBC # BLD: 4.77 MIL/MCL (ref 4–5.2)
RBC #/AREA URNS HPF: ABNORMAL /HPF (ref 0–2)
SODIUM SERPL-SCNC: 143 MMOL/L (ref 135–145)
SP GR UR STRIP: 1.02 (ref 1–1.03)
SPECIMEN SOURCE: ABNORMAL
SQUAMOUS #/AREA URNS HPF: ABNORMAL /HPF (ref 0–5)
TRIGL SERPL-MCNC: 110 MG/DL
TSH SERPL-ACNC: 1.1 MCUNITS/ML (ref 0.35–5)
UROBILINOGEN UR STRIP-MCNC: 4 MG/DL (ref 0–1)
WBC # BLD: 4.4 K/MCL (ref 4.2–11)
WBC #/AREA URNS HPF: ABNORMAL /HPF (ref 0–5)

## 2020-06-05 ENCOUNTER — OFFICE VISIT (OUTPATIENT)
Dept: FAMILY MEDICINE | Age: 55
End: 2020-06-05

## 2020-06-05 DIAGNOSIS — E78.49 OTHER HYPERLIPIDEMIA: Primary | ICD-10-CM

## 2020-06-05 DIAGNOSIS — R82.90 ABNORMAL URINE: ICD-10-CM

## 2020-06-05 DIAGNOSIS — I10 BENIGN ESSENTIAL HYPERTENSION: ICD-10-CM

## 2020-06-05 DIAGNOSIS — F33.42 MAJOR DEPRESSIVE DISORDER, RECURRENT EPISODE, IN FULL REMISSION (CMD): ICD-10-CM

## 2020-06-05 DIAGNOSIS — F31.81 BIPOLAR 2 DISORDER, MAJOR DEPRESSIVE EPISODE (CMD): ICD-10-CM

## 2020-06-05 PROCEDURE — 99214 OFFICE O/P EST MOD 30 MIN: CPT | Performed by: FAMILY MEDICINE

## 2020-06-05 RX ORDER — DESVENLAFAXINE SUCCINATE 50 MG/1
50 TABLET, EXTENDED RELEASE ORAL DAILY
Qty: 30 TABLET | Refills: 5 | Status: SHIPPED | OUTPATIENT
Start: 2020-06-05 | End: 2020-06-29

## 2020-06-05 SDOH — HEALTH STABILITY: MENTAL HEALTH: HOW OFTEN DO YOU HAVE A DRINK CONTAINING ALCOHOL?: NEVER

## 2020-06-05 ASSESSMENT — ENCOUNTER SYMPTOMS
CHEST TIGHTNESS: 0
BACK PAIN: 0
CONFUSION: 0
STRIDOR: 0
FEVER: 0
FATIGUE: 0

## 2020-06-29 ENCOUNTER — OFFICE VISIT (OUTPATIENT)
Dept: NEUROLOGY | Age: 55
End: 2020-06-29
Payer: COMMERCIAL

## 2020-06-29 VITALS
DIASTOLIC BLOOD PRESSURE: 78 MMHG | HEIGHT: 62 IN | WEIGHT: 134 LBS | BODY MASS INDEX: 24.66 KG/M2 | HEART RATE: 111 BPM | TEMPERATURE: 97.5 F | SYSTOLIC BLOOD PRESSURE: 138 MMHG

## 2020-06-29 PROBLEM — M54.6 ACUTE MIDLINE THORACIC BACK PAIN: Status: ACTIVE | Noted: 2020-06-29

## 2020-06-29 PROCEDURE — 99214 OFFICE O/P EST MOD 30 MIN: CPT | Performed by: NURSE PRACTITIONER

## 2020-06-29 RX ORDER — DESVENLAFAXINE SUCCINATE 50 MG/1
TABLET, EXTENDED RELEASE ORAL
Qty: 30 TABLET | Refills: 0 | Status: SHIPPED | OUTPATIENT
Start: 2020-06-29

## 2020-06-29 NOTE — PROGRESS NOTES
5/10 unless she moves. Previous medications include amitriptyline which caused severe sedating side effects, Lyrica which caused flank pain, and most recently gabapentin which provided no significant relief. The patient is here today for reevaluation. She had a consultation with Dr. Stefanie Munroe from pain management who did some injections. She has now been referred to another pain management physician for possible spinal nerve stimulator. The patient does feel much more comfortable than she had and is looking forward to possibly having some relief if she is eligible to receive a spinal cord stimulator. On today's visit, the patient reports that she has had midthoracic pain which has been present for approximately 6 to 8 weeks. She does not remember having any type of trauma or other injury. The pain is a 7/10 in intensity. She is using a previous lidocaine patch for her knee which does provide some relief. It is an aching pain. It does not radiate into her ribs.   She denies any weakness in her lower extremities                Prior testing reviewed:    -EMG/NCV study on January 16, 2019 which was normal.  -MRI of the lumbar spine showing an essentially normal exam for stated age. Apolinar Matteo bulging L4-L5 disc without contact with neuro structures.  No herniated discs, central canal or foraminal stenosis.  Mild lower lumbar facet joint arthrosis including a small for jet joint cyst as reported,6/18/15          PAST MEDICAL HISTORY:         Diagnosis Date    Complex regional pain syndrome i of right lower limb 10/2/2019    Depression 6/27/2019    Hyperlipidemia     Tachycardia         PAST SURGICAL HISTORY:         Procedure Laterality Date    WISDOM TOOTH EXTRACTION          SOCIAL HISTORY:     Social History     Socioeconomic History    Marital status:      Spouse name: Not on file    Number of children: Not on file    Years of education: Not on file    Highest education level: Not on file Occupational History    Not on file   Social Needs    Financial resource strain: Not on file    Food insecurity     Worry: Not on file     Inability: Not on file    Transportation needs     Medical: Not on file     Non-medical: Not on file   Tobacco Use    Smoking status: Current Every Day Smoker     Packs/day: 0.50     Years: 20.00     Pack years: 10.00     Types: Cigarettes    Smokeless tobacco: Never Used   Substance and Sexual Activity    Alcohol use: Yes     Alcohol/week: 0.0 standard drinks     Comment: rarely    Drug use: No    Sexual activity: Not on file   Lifestyle    Physical activity     Days per week: Not on file     Minutes per session: Not on file    Stress: Not on file   Relationships    Social connections     Talks on phone: Not on file     Gets together: Not on file     Attends L[employer] service: Not on file     Active member of club or organization: Not on file     Attends meetings of clubs or organizations: Not on file     Relationship status: Not on file    Intimate partner violence     Fear of current or ex partner: Not on file     Emotionally abused: Not on file     Physically abused: Not on file     Forced sexual activity: Not on file   Other Topics Concern    Not on file   Social History Narrative    Not on file       CURRENT MEDICATIONS:     Current Outpatient Medications   Medication Sig Dispense Refill    simvastatin (ZOCOR) 80 MG tablet TAKE ONE TABLET BY MOUTH EVERY NIGHT AT BEDTIME 30 tablet 5    doxyLAMINE succinate (GNP SLEEP AID) 25 MG tablet Take 25 mg by mouth      triamcinolone (NASACORT) 55 MCG/ACT nasal inhaler 110 mcg by Nasal route as needed       Multiple Vitamin (DAILY VITAMIN PO) Take  by mouth. No current facility-administered medications for this visit.          ALLERGIES:     Allergies   Allergen Reactions    Trazodone And Nefazodone     Nucynta [Tapentadol] Nausea And Vomiting                                 REVIEW OF SYSTEMS       All items

## 2020-09-22 ENCOUNTER — TELEPHONE (OUTPATIENT)
Dept: FAMILY MEDICINE CLINIC | Age: 55
End: 2020-09-22

## 2020-09-24 ENCOUNTER — TELEMEDICINE (OUTPATIENT)
Dept: FAMILY MEDICINE CLINIC | Age: 55
End: 2020-09-24
Payer: COMMERCIAL

## 2020-09-24 PROCEDURE — 99442 PR PHYS/QHP TELEPHONE EVALUATION 11-20 MIN: CPT | Performed by: FAMILY MEDICINE

## 2020-09-24 RX ORDER — CELECOXIB 200 MG/1
200 CAPSULE ORAL DAILY
Qty: 30 CAPSULE | Refills: 5 | Status: SHIPPED | OUTPATIENT
Start: 2020-09-24 | End: 2021-04-01

## 2020-09-24 RX ORDER — METHOCARBAMOL 750 MG/1
750 TABLET, FILM COATED ORAL 3 TIMES DAILY
Qty: 90 TABLET | Refills: 0 | Status: SHIPPED | OUTPATIENT
Start: 2020-09-24 | End: 2020-10-24

## 2020-09-24 ASSESSMENT — PATIENT HEALTH QUESTIONNAIRE - PHQ9
SUM OF ALL RESPONSES TO PHQ9 QUESTIONS 1 & 2: 0
SUM OF ALL RESPONSES TO PHQ QUESTIONS 1-9: 0
1. LITTLE INTEREST OR PLEASURE IN DOING THINGS: 0
2. FEELING DOWN, DEPRESSED OR HOPELESS: 0
SUM OF ALL RESPONSES TO PHQ QUESTIONS 1-9: 0

## 2020-09-24 NOTE — PROGRESS NOTES
Blas Barber is a 47 y.o. female evaluated via telephone on 9/24/2020. Consent:  She and/or health care decision maker is aware that that she may receive a bill for this telephone service, depending on her insurance coverage, and has provided verbal consent to proceed: Yes      Documentation:  I communicated with the patient and/or health care decision maker about she is being seen today and telephone visit evaluation. She stated she is having increasing back pain she states she literally hurts from the end of her neck all the way to her tailbone she does not discriminate any of specific area she denies any numbness she states is been really bad over the last month and she developed an episode recently she where she was having such back spasms that she felt she needed to go to the emergency room they did an MRI of her thoracic and lumbar she has a few bulging disc in her thoracic area  She already has seen an orthopedic spine doctor she has an appointment to see a pain doctor and to see a neurosurgeon she states the medications that they gave her from the ER not helping her at all. Details of this discussion including any medical advice provided: Discussed with patient since she has all these appointments coming up she needs to follow-up with them potentially they could maybe try some epidurals for her and get her back in therapy and I sent in different meds for her to try      I affirm this is a Patient Initiated Episode with a Patient who has not had a related appointment within my department in the past 7 days or scheduled within the next 24 hours.     Patient identification was verified at the start of the visit: Yes    Total Time: minutes: 11-20 minutes    Note: not billable if this call serves to triage the patient into an appointment for the relevant concern      Josie Green

## 2020-09-25 ENCOUNTER — TELEPHONE (OUTPATIENT)
Dept: FAMILY MEDICINE CLINIC | Age: 55
End: 2020-09-25

## 2020-09-25 NOTE — TELEPHONE ENCOUNTER
Pt states she was given Robaxin 500mg by the ER and was on it for 4 days with no relief. Pt wants to know why Robaxin 750mg was sent in when she had already tried Robaxin 500mg. Pt has taken 2 doses(one yesterday and one today) of Robaxin 750mg and it is not working for back pain. Pt wants to know if there is something else she can take.

## 2020-10-06 ENCOUNTER — TELEPHONE (OUTPATIENT)
Dept: FAMILY MEDICINE CLINIC | Age: 55
End: 2020-10-06

## 2020-10-06 RX ORDER — OXYCODONE HYDROCHLORIDE AND ACETAMINOPHEN 5; 325 MG/1; MG/1
1 TABLET ORAL EVERY 6 HOURS PRN
Qty: 20 TABLET | Refills: 0 | Status: SHIPPED | OUTPATIENT
Start: 2020-10-06 | End: 2020-10-11

## 2020-10-29 ENCOUNTER — OFFICE VISIT (OUTPATIENT)
Dept: NEUROLOGY | Age: 55
End: 2020-10-29
Payer: COMMERCIAL

## 2020-10-29 VITALS
HEIGHT: 62 IN | WEIGHT: 135 LBS | BODY MASS INDEX: 24.84 KG/M2 | TEMPERATURE: 98.4 F | DIASTOLIC BLOOD PRESSURE: 70 MMHG | HEART RATE: 107 BPM | SYSTOLIC BLOOD PRESSURE: 129 MMHG

## 2020-10-29 PROBLEM — M48.04 THORACIC SPINAL STENOSIS: Status: ACTIVE | Noted: 2020-06-29

## 2020-10-29 PROCEDURE — 99214 OFFICE O/P EST MOD 30 MIN: CPT | Performed by: NURSE PRACTITIONER

## 2020-10-29 RX ORDER — VENLAFAXINE HYDROCHLORIDE 37.5 MG/1
37.5 CAPSULE, EXTENDED RELEASE ORAL DAILY
Qty: 30 CAPSULE | Refills: 3 | Status: SHIPPED | OUTPATIENT
Start: 2020-10-29 | End: 2021-01-05 | Stop reason: SDUPTHER

## 2020-10-29 RX ORDER — CYCLOBENZAPRINE HCL 10 MG
10 TABLET ORAL 3 TIMES DAILY PRN
Qty: 30 TABLET | Refills: 0 | Status: SHIPPED | OUTPATIENT
Start: 2020-10-29 | End: 2020-12-06

## 2020-10-29 RX ORDER — OXYCODONE HYDROCHLORIDE AND ACETAMINOPHEN 5; 325 MG/1; MG/1
TABLET ORAL
COMMUNITY
End: 2022-01-06

## 2020-10-29 NOTE — PROGRESS NOTES
[facility]            [address]          [place], [facility]          Dept: [phone]          Dept Fax: [phone]        E. Sheilah Olp, MD Everette Searing, MD Ahmed B. Joni Ahr, MD Lucila Gilford, MD Delpha Round, MD Linnie Beth, CNP            10/29/2020      HISTORY OF PRESENT ILLNESS:       I had the pleasure of seeing Noel Rodriguez, who returns for continuing neurologic care. The patient is a 71-year-old woman who was seen last on June 29, 2020 for management of dysesthesias of her right knee.  In 2013, the patient had an hyperextension injury of her right knee and then a reoccurring injury.  Finally, when the symptoms had not resolved, she was referred to an orthopedic surgeon who did arthroscopic surgery in March 2014. Bambi Lawson was a suspected ligament tear, but with surgery, nothing was found.  She underwent several sessions of physical therapy including aqua therapy.  She had several x-rays completed of her knee as well as MRI of her knee and low back.  She then had a right lumbar sympathectomy that had provided some relief.  She then had a genicular ablation completed by pain management that relieved her from having 24/7 pain to a tolerable pain of 5/10.  She has had several nerve blocks in her right knee and her right lateral femoral cutaneous area at the [address] [place] [address] as well as radiofrequency ablations in February 2016 in May 2017.  She had another opinion at the [facility] as well as orthopedics at the [address] [place] [address],[address].  Previous medications were tried including Norco, Percocet, meloxicam, gabapentin, tramadol, and Nucynta.  She has also tried the lidocaine patch, bupropion, and citalopram.  None of these medications provided prolonged relief of her pain.  The pain in her knee is burning and gnawing.  It can be as intense as a 10/10, but typically a 5/10 unless she moves.    Previous medications include amitriptyline which caused severe sedating side effects, Lyrica which caused flank pain, and most recently gabapentin which provided no significant relief. At her last visit, it was noted that she had a consultation with Dr. Jaylyn Glez from pain management who did some injections. She had then been referred to another pain management physician for possible spinal nerve stimulator. The patient felt much more comfortable than she had and was looking forward to possibly having some relief if she is eligible to receive a spinal cord stimulator. The patient reported that she has had midthoracic pain which has been present for approximately 6 to 8 weeks previous to her visit. She did not remember having any type of trauma or other injury. The pain is a 7/10 in intensity. She was using a previous lidocaine patch for her knee which does provide some relief. It was an aching pain. It did not radiate into her ribs. She denied any weakness in her lower extremities. The patient presents today for reevaluation. The patient never received the spinal nerve stimulator. The patient had her MRI's at [facility]. The results of the thoracic MRI were given to the patient. The patient went to see Dr. Alex Sanches, spinal surgeon who sent the patient to Dr. Citlali Keen, neurosurgeon. On 9/21/2020 the patient went to the ED after waking up at 12:30am screaming and crying because of her back pain. She was told she had decreased reflexes on the left side and that there is still enough space for the stimulator. She went back to pain management and she will be getting epidural injections in the thoracic area next week. The patient saw her PCP after going to the ED and she switched the patient from Naproxen to Celebrex. The patient admits she has been on anti-inflammatory medications for about 20 years because of arthritis. The patient ws previously on Duloxetine 60 mg a few years back.  The pain wraps around the ribcage. The patient experiences pain when sitting straight, moving, and driving. She has to sit at weird angles to make her back feel OK but this hurts everything else. The patient was previously taking methocarbamol which did not help her and made her jittery. The patient admits to some depression because of everything that is going on with her. The patient has not noticed her knee pain as often because she has been more focused on the pain in her back.         Prior testing reviewed:       - MRI Thoracic spine 08/14/2020  Impression: 1. Large central slightly greater to left of midline disc herniation at T9-10 with cord flattening           2.  Small right paracentral disc protrusions at T7-8 and T9-10    -EMG/NCV study on January 16, 2019 which was normal.  -MRI of the lumbar spine showing an essentially normal exam for stated age. Ollis Warren bulging L4-L5 disc without contact with neuro structures.  No herniated discs, central canal or foraminal stenosis.  Mild lower lumbar facet joint arthrosis including a small for jet joint cyst as reported,6/18/15               PAST MEDICAL HISTORY:         Diagnosis Date    Complex regional pain syndrome i of right lower limb 10/2/2019    Depression 6/27/2019    Hyperlipidemia     Tachycardia         PAST SURGICAL HISTORY:         Procedure Laterality Date    WISDOM TOOTH EXTRACTION          SOCIAL HISTORY:     Social History     Socioeconomic History    Marital status:      Spouse name: Not on file    Number of children: Not on file    Years of education: Not on file    Highest education level: Not on file   Occupational History    Not on file   Social Needs    Financial resource strain: Not on file    Food insecurity     Worry: Not on file     Inability: Not on file    Transportation needs     Medical: Not on file     Non-medical: Not on file   Tobacco Use    Smoking status: Current Every Day Smoker     Packs/day: 0.50     Years: 20.00 Pack years: 10.00     Types: Cigarettes    Smokeless tobacco: Never Used   Substance and Sexual Activity    Alcohol use: Yes     Alcohol/week: 0.0 standard drinks     Comment: rarely    Drug use: No    Sexual activity: Not on file   Lifestyle    Physical activity     Days per week: Not on file     Minutes per session: Not on file    Stress: Not on file   Relationships    Social connections     Talks on phone: Not on file     Gets together: Not on file     Attends Sikhism service: Not on file     Active member of club or organization: Not on file     Attends meetings of clubs or organizations: Not on file     Relationship status: Not on file    Intimate partner violence     Fear of current or ex partner: Not on file     Emotionally abused: Not on file     Physically abused: Not on file     Forced sexual activity: Not on file   Other Topics Concern    Not on file   Social History Narrative    Not on file       CURRENT MEDICATIONS:     Current Outpatient Medications   Medication Sig Dispense Refill    oxyCODONE-acetaminophen (PERCOCET) 5-325 MG per tablet Percocet 5 mg-325 mg tablet   Take 1 tablet as needed by oral route.  cyclobenzaprine (FLEXERIL) 10 MG tablet Take 1 tablet by mouth 3 times daily as needed for Muscle spasms 30 tablet 0    venlafaxine (EFFEXOR XR) 37.5 MG extended release capsule Take 1 capsule by mouth daily 30 capsule 3    celecoxib (CELEBREX) 200 MG capsule Take 1 capsule by mouth daily 30 capsule 5    simvastatin (ZOCOR) 80 MG tablet TAKE ONE TABLET BY MOUTH EVERY NIGHT AT BEDTIME (Patient taking differently: 40 mg TAKE ONE TABLET BY MOUTH EVERY NIGHT AT BEDTIME) 30 tablet 5    doxyLAMINE succinate (GNP SLEEP AID) 25 MG tablet Take 25 mg by mouth      triamcinolone (NASACORT) 55 MCG/ACT nasal inhaler 110 mcg by Nasal route as needed       Multiple Vitamin (DAILY VITAMIN PO) Take  by mouth. No current facility-administered medications for this visit. ALLERGIES:     Allergies   Allergen Reactions    Trazodone And Nefazodone     Nucynta [Tapentadol] Nausea And Vomiting                                 REVIEW OF SYSTEMS        All items selected indicate a positive finding. Those items not selected are negative. Constitutional [] Weight loss/gain   [] Fatigue  [] Fever/Chills   HEENT [] Hearing Loss  [] Visual Disturbance  [] Tinnitus  [] Eye pain   Respiratory [] Shortness of Breath  [] Cough  [] Snoring   Cardiovascular [] Chest Pain  [] Palpitations  [] Lightheaded   GI [] Constipation  [] Diarrhea  [] Swallowing change  [] Nausea/vomiting    [] Urinary Frequency  [] Urinary Urgency   Musculoskeletal [x] Neck pain  [x] Back pain  [x] Muscle pain  [] Restless legs   Dermatologic [] Skin changes   Neurologic [] Memory loss/confusion  [] Seizures  [] Trouble walking or imbalance  [] Dizziness  [] Sleep disturbance  [] Weakness  [] Numbness  [] Tremors  [] Speech Difficulty  [] Headaches  [] Light Sensitivity  [] Sound Sensitivity   Endocrinology []Excessive thirst  []Excessive hunger   Psychiatric [x] Anxiety/Depression  [] Hallucination   Allergy/immunology []Hives/environmental allergies   Hematologic/lymph [] Abnormal bleeding  [] Abnormal bruising         PHYSICAL EXAMINATION:       Vitals:    10/29/20 0954   BP: 129/70   Pulse: 107   Temp: 98.4 °F (36.9 °C)                                              .                                                                                                     General Appearance:  Alert, cooperative, no signs of distress, appears stated age   Head:  Normocephalic, no signs of trauma   Eyes:  Conjunctiva/corneas clear;  eyelids intact   Ears:  Normal external ear and canals   Nose: Nares normal, mucosa normal, no drainage    Throat: Lips and tongue normal; teeth normal;  gums normal   Neck: Supple, intact flexion, extension and rotation;   trachea midline;  no adenopathy;   thyroid: not enlarged;   no carotid pulse abnormality   Back:   Symmetric, no curvature, ROM adequate   Lungs:   Respirations unlabored   Heart:  Regular rate and rhythm           Extremities: Extremities normal, no cyanosis, no edema   Pulses: Symmetric over head and neck   Skin: Skin color, texture normal, no rashes, no lesions                                     NEUROLOGIC EXAMINATION    Neurologic Exam  Mental status    Alert and oriented x 3; intact memory with no confusion, speech or language problems; no hallucinations or delusions  Fund of information appropriate for level of education    Cranial nerves    II - visual fields intact to confrontation bilaterally  III, IV, VI - extra-ocular muscles full: no pupillary defect; no NELIDA, no nystagmus, no ptosis   V - normal facial sensation                                                               VII - normal facial symmetry                                                             VIII - intact hearing                                                                             IX, X - symmetrical palate                                                                  XI - symmetrical shoulder shrug                                                       XII - tongue midline without atrophy or fasciculation      Motor function  Normal muscle bulk and tone; strength 5/5 on all 4 extremities, no pronator drift      Sensory function Intact to light touch, pinprick, vibration, proprioception on all 4 extremities      Cerebellar Intact fine motor movement. No involuntary movements or tremors. No ataxia or dysmetria on finger to nose or heel to shin testing      Reflex function DTR 2+ on bilateral UE and left LE, symmetric. Negative Babinski. Mild unsustained clonus of the right lower extremity      Gait                   normal base and arm swing            ASSESSMENT AND PLAN:         In summary, your patient, Gonzalo Blackburn exhibits the following, with associated plan:    1.  Dysesthesias of the right knee which is chronic since an injury in 2013. She was previously diagnosed with complex regional pain syndrome  1. Follow-up with pain management for possible placement of a spinal cord stimulator  2. Start Effexor 37.5 mg daily  3. Return in follow-up in 3 months  2. New onset midthoracic back pain, due to disc herniations and cord flattening of the thoracic cord    1. Patient to continue to use lidocaine patch. If she needs a new prescription, she will contact the office. She was also advised that she could use salon pas patches  2. Start Flexeril 10mg TID, but patient was advised to use it daily until she is able to get used to it so that it does not cause sedation  3. Continue to follow-up with neurosurgery          Signed: Abigail Katz CNP      *Please note that portions of this note were completed with a voice recognition program.  Although every effort was made to insure the accuracy of this automated transcription, some errors in transcription may have occurred, occasionally words and are mis-transcribed      Scribe Attestation:   By signing my name below, Sourav JONES, attest that this documentation has been prepared under the direction and in the presence of Abigail Katz CNP.

## 2020-12-03 ENCOUNTER — APPOINTMENT (OUTPATIENT)
Dept: FAMILY MEDICINE | Age: 55
End: 2020-12-03

## 2020-12-03 NOTE — TELEPHONE ENCOUNTER
Pharmacy requesting a  refill of Flexeril 10mg .       Medication active on med list yes      Date of last prescription 10/29/2020  with 0 refills verified on 12/03/2020    verified by JAN DRAKE      Date of last appointment 10/29/2020    Next Visit Date:  1/5/2021

## 2020-12-04 DIAGNOSIS — M54.6 ACUTE MIDLINE THORACIC BACK PAIN: ICD-10-CM

## 2020-12-06 RX ORDER — CYCLOBENZAPRINE HCL 10 MG
TABLET ORAL
Qty: 30 TABLET | Refills: 0 | Status: SHIPPED | OUTPATIENT
Start: 2020-12-06 | End: 2021-01-05 | Stop reason: SDUPTHER

## 2020-12-10 ENCOUNTER — APPOINTMENT (OUTPATIENT)
Dept: FAMILY MEDICINE | Age: 55
End: 2020-12-10

## 2021-01-05 ENCOUNTER — OFFICE VISIT (OUTPATIENT)
Dept: NEUROLOGY | Age: 56
End: 2021-01-05
Payer: COMMERCIAL

## 2021-01-05 VITALS
DIASTOLIC BLOOD PRESSURE: 61 MMHG | WEIGHT: 126 LBS | HEIGHT: 62 IN | HEART RATE: 111 BPM | TEMPERATURE: 97.5 F | BODY MASS INDEX: 23.19 KG/M2 | SYSTOLIC BLOOD PRESSURE: 133 MMHG

## 2021-01-05 DIAGNOSIS — M48.04 THORACIC SPINAL STENOSIS: ICD-10-CM

## 2021-01-05 DIAGNOSIS — G90.521 COMPLEX REGIONAL PAIN SYNDROME I OF RIGHT LOWER LIMB: Primary | ICD-10-CM

## 2021-01-05 DIAGNOSIS — F32.0 CURRENT MILD EPISODE OF MAJOR DEPRESSIVE DISORDER WITHOUT PRIOR EPISODE (HCC): ICD-10-CM

## 2021-01-05 PROCEDURE — 99214 OFFICE O/P EST MOD 30 MIN: CPT | Performed by: NURSE PRACTITIONER

## 2021-01-05 RX ORDER — VENLAFAXINE HYDROCHLORIDE 37.5 MG/1
37.5 CAPSULE, EXTENDED RELEASE ORAL DAILY
Qty: 30 CAPSULE | Refills: 5 | Status: SHIPPED | OUTPATIENT
Start: 2021-01-05 | End: 2021-07-06 | Stop reason: SDUPTHER

## 2021-01-05 RX ORDER — CYCLOBENZAPRINE HCL 10 MG
TABLET ORAL
Qty: 30 TABLET | Refills: 5 | Status: SHIPPED | OUTPATIENT
Start: 2021-01-05 | End: 2022-01-11

## 2021-01-05 NOTE — PROGRESS NOTES
[facility]            [address]          [facility], [address] [facility] [place]          Dept: [phone]          Dept Fax: [phone]        MD Kia Villegas MD Ahmed B. Emmette Ghee, MD Wadell Brewster, MD Comer Hollering, MD Jovi Nolasco, CNP            1/5/2021      HISTORY OF PRESENT ILLNESS:       I had the pleasure of seeing Warner Loredo, who returns for continuing neurologic care. The patient was seen last on October 29, 2020 for treatment of dysesthesias of her right knee and depression. In November 2020 she received epidural injections for treatment of her pain caused from thoracic spinal stenosis, which relieved her pain for 5 weeks. She is here today reporting that she has noticed an 80% improvement in her pain since receiving a stimulator trial in December. Patient was very hopeful with the improvement in her pain with the stimulator trial.  She is awaiting insurance approval for placement of a permanent spinal cord stimulator. At her last visit, the patient was also prescribed Effexor 37.5 mg for treatment of depression. She has noticed a significant difference with that treatment in her depression. She did have a transient episode of nausea for approximately 1 week after starting the medication. Testing reviewed:    - MRI Thoracic spine 08/14/2020  Impression: 1.  Large central slightly greater to left of midline disc herniation at T9-10 with cord flattening                      2. Small right paracentral disc protrusions at T7-8 and T9-10     -EMG/NCV study on January 16, 2019 which was normal.  -MRI of the lumbar spine showing an essentially normal exam for stated age. Urvashi Generous bulging L4-L5 disc without contact with neuro structures.  No herniated discs, central canal or foraminal stenosis.  Mild lower lumbar facet joint arthrosis including a small for jet joint cyst as reported,6/18/15          PAST MEDICAL HISTORY:         Diagnosis Date    Complex regional pain syndrome i of right lower limb 10/2/2019    Depression 6/27/2019    Hyperlipidemia     Tachycardia         PAST SURGICAL HISTORY:         Procedure Laterality Date    WISDOM TOOTH EXTRACTION          SOCIAL HISTORY:     Social History     Socioeconomic History    Marital status:      Spouse name: Not on file    Number of children: Not on file    Years of education: Not on file    Highest education level: Not on file   Occupational History    Not on file   Social Needs    Financial resource strain: Not on file    Food insecurity     Worry: Not on file     Inability: Not on file   [employer] needs     Medical: Not on file     Non-medical: Not on file   Tobacco Use    Smoking status: Current Every Day Smoker     Packs/day: 0.50     Years: 20.00     Pack years: 10.00     Types: Cigarettes    Smokeless tobacco: Never Used   Substance and Sexual Activity    Alcohol use:  Yes     Alcohol/week: 0.0 standard drinks     Comment: rarely    Drug use: No    Sexual activity: Not on file   Lifestyle    Physical activity     Days per week: Not on file     Minutes per session: Not on file    Stress: Not on file   Relationships    Social connections     Talks on phone: Not on file     Gets together: Not on file     Attends Confucianist service: Not on file     Active member of club or organization: Not on file     Attends meetings of clubs or organizations: Not on file     Relationship status: Not on file    Intimate partner violence     Fear of current or ex partner: Not on file     Emotionally abused: Not on file     Physically abused: Not on file     Forced sexual activity: Not on file   Other Topics Concern    Not on file   Social History Narrative    Not on file       CURRENT MEDICATIONS:     Current Outpatient Medications   Medication Sig Dispense Refill    cyclobenzaprine (FLEXERIL) 10 MG tablet TAKE ONE TABLET BY MOUTH THREE TIMES A DAY AS NEEDED FOR MUSCLE SPASMS 30 tablet 5    venlafaxine (EFFEXOR XR) 37.5 MG extended release capsule Take 1 capsule by mouth daily 30 capsule 5    oxyCODONE-acetaminophen (PERCOCET) 5-325 MG per tablet Percocet 5 mg-325 mg tablet   Take 1 tablet as needed by oral route.  celecoxib (CELEBREX) 200 MG capsule Take 1 capsule by mouth daily 30 capsule 5    simvastatin (ZOCOR) 80 MG tablet TAKE ONE TABLET BY MOUTH EVERY NIGHT AT BEDTIME (Patient taking differently: 40 mg TAKE ONE TABLET BY MOUTH EVERY NIGHT AT BEDTIME) 30 tablet 5    doxyLAMINE succinate (GNP SLEEP AID) 25 MG tablet Take 25 mg by mouth       No current facility-administered medications for this visit. ALLERGIES:     Allergies   Allergen Reactions    Trazodone And Nefazodone     Nucynta [Tapentadol] Nausea And Vomiting                                 REVIEW OF SYSTEMS        All items selected indicate a positive finding. Those items not selected are negative.   Constitutional [] Weight loss/gain   [] Fatigue  [] Fever/Chills   HEENT [] Hearing Loss  [] Visual Disturbance  [] Tinnitus  [] Eye pain   Respiratory [] Shortness of Breath  [] Cough  [] Snoring   Cardiovascular [] Chest Pain  [] Palpitations  [] Lightheaded   GI [] Constipation  [] Diarrhea  [] Swallowing change  [] Nausea/vomiting    [] Urinary Frequency  [] Urinary Urgency   Musculoskeletal [] Neck pain  [x] Back pain  [x] Muscle pain  [] Restless legs   Dermatologic [] Skin changes   Neurologic [] Memory loss/confusion  [] Seizures  [] Trouble walking or imbalance  [] Dizziness  [] Sleep disturbance  [] Weakness  [] Numbness  [] Tremors  [] Speech Difficulty  [] Headaches  [] Light Sensitivity  [] Sound Sensitivity   Endocrinology []Excessive thirst  []Excessive hunger   Psychiatric [x] Anxiety/Depression  [] Hallucination   Allergy/immunology []Hives/environmental allergies   Hematologic/lymph [] Abnormal bleeding  [] without atrophy or fasciculation      Motor function  Normal muscle bulk and tone; strength 5/5 on all 4 extremities, no pronator drift      Sensory function Intact to light touch, pinprick, vibration, proprioception on all 4 extremities      Cerebellar Intact fine motor movement. No involuntary movements or tremors. No ataxia or dysmetria on finger to nose or heel to shin testing      Reflex function DTR 2+ on bilateral UE and LE, symmetric. Negative Babinski      Gait                   normal base and arm swing                  Medical Decision Making: In summary, your patient, Cathryn Carmona exhibits the following, with associated plan:    1. Dysesthesias of the right knee which is chronic since an injury in 2013.  She was previously diagnosed with complex regional pain syndrome  1. Continue to follow with pain management  2. Continue Effexor 37.5 mg daily for treatment of depression  3. Return in follow-up in 6 months  2. Midthoracic back pain, due to disc herniations and cord flattening of the thoracic cord, which is significantly improved after epidural steroid injections     1. Patient to continue to use lidocaine patch.  If she needs a new prescription, she will contact the office.  She was also advised that she could use salon pas patches  2. Continue Flexeril 10mg TID, but patient was advised to use it daily until she is able to get used to it so that it does not cause sedation. Patient takes Flexeril very sparingly.   She was advised that if necessary, she can contact the office for a prescription in the future.               Signed: Daralene Cowden, CNP      *Please note that portions of this note were completed with a voice recognition program.  Although every effort was made to insure the accuracy of this automated transcription, some errors in transcription may have occurred, occasionally words and are mis-transcribed    Provider Attestation:    *The documentation recorded by the scribe accurately reflects the service I personally performed and the decisions made by myself. Portions of this exam were transcribed by a scribe. I personally performed the history, physical exam, and the medical decision-making and confirm the accuracy of the information in the transcribed note. *      Scribe Attestation:     By signing my name below, I, Luis Antonio Myers, attest that this documentation has been prepared under the direction and in the presence of Hair Wells CNP.

## 2021-03-31 DIAGNOSIS — G89.29 CHRONIC BILATERAL THORACIC BACK PAIN: ICD-10-CM

## 2021-03-31 DIAGNOSIS — M54.6 CHRONIC BILATERAL THORACIC BACK PAIN: ICD-10-CM

## 2021-04-01 RX ORDER — CELECOXIB 200 MG/1
CAPSULE ORAL
Qty: 30 CAPSULE | Refills: 4 | Status: SHIPPED | OUTPATIENT
Start: 2021-04-01 | End: 2021-09-30 | Stop reason: SDUPTHER

## 2021-04-19 DIAGNOSIS — E78.5 HYPERLIPIDEMIA, UNSPECIFIED HYPERLIPIDEMIA TYPE: ICD-10-CM

## 2021-04-19 DIAGNOSIS — G62.9 NEUROPATHY: ICD-10-CM

## 2021-04-19 RX ORDER — SIMVASTATIN 80 MG
TABLET ORAL
Qty: 30 TABLET | Refills: 4 | Status: SHIPPED | OUTPATIENT
Start: 2021-04-19 | End: 2021-09-30 | Stop reason: SDUPTHER

## 2021-05-19 ENCOUNTER — TELEPHONE (OUTPATIENT)
Dept: FAMILY MEDICINE CLINIC | Age: 56
End: 2021-05-19

## 2021-05-19 DIAGNOSIS — M25.50 ARTHRALGIA, UNSPECIFIED JOINT: Primary | ICD-10-CM

## 2021-05-20 NOTE — TELEPHONE ENCOUNTER
Done.
Patient is requesting a referral to [facility] rheumatology. FX# [phone]. Evangelista Adams Patient can be reached at [phone] if needed.
Please add dx.
ok
No

## 2021-06-01 ENCOUNTER — NURSE TRIAGE (OUTPATIENT)
Dept: OTHER | Facility: CLINIC | Age: 56
End: 2021-06-01

## 2021-06-02 ENCOUNTER — OFFICE VISIT (OUTPATIENT)
Dept: FAMILY MEDICINE CLINIC | Age: 56
End: 2021-06-02
Payer: COMMERCIAL

## 2021-06-02 VITALS
OXYGEN SATURATION: 98 % | HEIGHT: 62 IN | BODY MASS INDEX: 23.89 KG/M2 | HEART RATE: 122 BPM | DIASTOLIC BLOOD PRESSURE: 79 MMHG | WEIGHT: 129.8 LBS | SYSTOLIC BLOOD PRESSURE: 138 MMHG

## 2021-06-02 DIAGNOSIS — M54.6 ACUTE MIDLINE THORACIC BACK PAIN: Primary | ICD-10-CM

## 2021-06-02 PROCEDURE — 4004F PT TOBACCO SCREEN RCVD TLK: CPT | Performed by: FAMILY MEDICINE

## 2021-06-02 PROCEDURE — G8420 CALC BMI NORM PARAMETERS: HCPCS | Performed by: FAMILY MEDICINE

## 2021-06-02 PROCEDURE — 3017F COLORECTAL CA SCREEN DOC REV: CPT | Performed by: FAMILY MEDICINE

## 2021-06-02 PROCEDURE — 99213 OFFICE O/P EST LOW 20 MIN: CPT | Performed by: FAMILY MEDICINE

## 2021-06-02 PROCEDURE — G8427 DOCREV CUR MEDS BY ELIG CLIN: HCPCS | Performed by: FAMILY MEDICINE

## 2021-06-02 SDOH — ECONOMIC STABILITY: FOOD INSECURITY: WITHIN THE PAST 12 MONTHS, THE FOOD YOU BOUGHT JUST DIDN'T LAST AND YOU DIDN'T HAVE MONEY TO GET MORE.: NEVER TRUE

## 2021-06-02 SDOH — ECONOMIC STABILITY: FOOD INSECURITY: WITHIN THE PAST 12 MONTHS, YOU WORRIED THAT YOUR FOOD WOULD RUN OUT BEFORE YOU GOT MONEY TO BUY MORE.: NEVER TRUE

## 2021-06-02 ASSESSMENT — PATIENT HEALTH QUESTIONNAIRE - PHQ9
SUM OF ALL RESPONSES TO PHQ QUESTIONS 1-9: 0
SUM OF ALL RESPONSES TO PHQ QUESTIONS 1-9: 0
1. LITTLE INTEREST OR PLEASURE IN DOING THINGS: 0
SUM OF ALL RESPONSES TO PHQ QUESTIONS 1-9: 0
SUM OF ALL RESPONSES TO PHQ9 QUESTIONS 1 & 2: 0
2. FEELING DOWN, DEPRESSED OR HOPELESS: 0

## 2021-06-02 ASSESSMENT — SOCIAL DETERMINANTS OF HEALTH (SDOH): HOW HARD IS IT FOR YOU TO PAY FOR THE VERY BASICS LIKE FOOD, HOUSING, MEDICAL CARE, AND HEATING?: NOT HARD AT ALL

## 2021-06-02 NOTE — PROGRESS NOTES
[facility]  [address] [place] [address] [address] [facility] [address]  Dept: [phone]      Jacobo Victor is a 54 y.o. female who presents today for follow up on her  medical conditions as noted below. Chief Complaint   Patient presents with    Back Pain       Patient Active Problem List:     Hyperlipidemia     Cervical pain (neck)     Hypercholesterolemia     Dysesthesia     Depression     Complex regional pain syndrome i of right lower limb     Thoracic spinal stenosis     Past Medical History:   Diagnosis Date    Complex regional pain syndrome i of right lower limb 10/2/2019    Depression 6/27/2019    Hyperlipidemia     Tachycardia       Past Surgical History:   Procedure Laterality Date    WISDOM TOOTH EXTRACTION       Family History   Adopted: Yes   Family history unknown: Yes       Current Outpatient Medications   Medication Sig Dispense Refill    simvastatin (ZOCOR) 80 MG tablet TAKE ONE TABLET BY MOUTH EVERY NIGHT AT BEDTIME 30 tablet 4    celecoxib (CELEBREX) 200 MG capsule TAKE ONE CAPSULE BY MOUTH DAILY 30 capsule 4    cyclobenzaprine (FLEXERIL) 10 MG tablet TAKE ONE TABLET BY MOUTH THREE TIMES A DAY AS NEEDED FOR MUSCLE SPASMS 30 tablet 5    venlafaxine (EFFEXOR XR) 37.5 MG extended release capsule Take 1 capsule by mouth daily 30 capsule 5    oxyCODONE-acetaminophen (PERCOCET) 5-325 MG per tablet Percocet 5 mg-325 mg tablet   Take 1 tablet as needed by oral route.  doxyLAMINE succinate (GNP SLEEP AID) 25 MG tablet Take 25 mg by mouth       No current facility-administered medications for this visit.      ALLERGIES:    Allergies   Allergen Reactions    Trazodone And Nefazodone     Nucynta [Tapentadol] Nausea And Vomiting       Social History     Tobacco Use    Smoking status: Current Every Day Smoker     Packs/day: 0.50     Years: 20.00     Pack years: 10.00     Types: Cigarettes    Smokeless tobacco: Never Used   Substance Use Topics  Alcohol use: Yes     Alcohol/week: 0.0 standard drinks     Comment: rarely        LDL Calculated (mg/dL)   Date Value   02/25/2020 140     HDL (mg/dL)   Date Value   02/25/2020 39     BUN (mg/dL)   Date Value   03/02/2021 18     CREATININE (mg/dL)   Date Value   03/02/2021 0.71     Glucose (mg/dL)   Date Value   03/02/2021 77     Hemoglobin A1C (%)   Date Value   02/25/2020 5.6              Subjective:      HPI  He is being seen today because she states that she bent over about 3 to 4 days ago and had sudden onset of extreme mid thoracic back pain right on her spine the pain is excruciating she does have a history of a disc protrusion at T9-10 she has previous history of surgery she is quite miserable she did go to the emergency room a CT scan was done which did not show any acute fracture she is not able to function or do anything because she is in so much pain and she is not able to get into her other doctors until the end of July    Review of Systems:     Constitutional: Negative for fever, appetite change and fatigue. Family social and medical history reviewed and unchanged     HENT: Negative. Negative for nosebleeds, trouble swallowing and neck pain. Eyes: Negative for photophobia and visual disturbance. Respiratory: Negative. Negative for chest tightness and shortness of breath. Cardiovascular: Negative. Negative for chest pain and leg swelling. Gastrointestinal: Negative. Negative for abdominal pain and blood in stool. Endocrine: Negative for cold intolerance and polyuria. Genitourinary: Negative for dysuria and hematuria. Musculoskeletal: Negative. Skin: Negative for rash. Allergic/Immunologic: Negative. Neurological: Negative. Negative for dizziness, weakness and numbness. Hematological: Negative. Negative for adenopathy. Does not bruise/bleed easily. Psychiatric/Behavioral: Negative for sleep disturbance, dysphoric mood and  decreased concentration.  The patient is not nervous/anxious. Objective:     Physical Exam:     Nursing note and vitals reviewed. /79   Pulse 122   Ht 5' 2\" (1.575 m)   Wt 129 lb 12.8 oz (58.9 kg)   LMP 03/14/2013   SpO2 98%   BMI 23.74 kg/m²   Constitutional: She is oriented to person, place, and time. She   appears well-developed and well-nourished. HENT:   Head: Normocephalic and atraumatic. Right Ear: External ear normal. Tympanic membrane is not erythematous. No middle ear effusion. Left Ear: External ear normal. Tympanic membrane is not erythematous. No middle ear effusion. Nose: No mucosal edema. Mouth/Throat: Oropharynx is clear and moist. No posterior oropharyngeal erythema. Eyes: Conjunctivae and EOM are normal. Pupils are equal, round, and reactive to light. Neck: Normal range of motion. Neck supple. No thyromegaly present. Cardiovascular: Normal rate, regular rhythm and normal heart sounds. No murmur heard. Pulmonary/Chest: Effort normal and breath sounds normal. She has no wheezes. Shehas no rales. Abdominal: Soft. Bowel sounds are normal. She exhibits no distension and no mass. There is no tenderness. There is no rebound and no guarding. Genitourinary/Anorectal:deferred  Musculoskeletal: decreased  range of motion. She exhibits no edema or extreme  tenderness. mid thor on the spine  Lymphadenopathy: She has no cervical adenopathy. Neurological: She is alert and oriented to person, place, and time. She has normal reflexes. Skin: Skin is warm and dry. No rash noted. Psychiatric: She has a normal mood and affect. Her   behavior is normal.       Assessment:      1. Acute midline thoracic back pain          Plan:      Call or return to clinic prn if these symptoms worsen or fail to improve as anticipated. I have reviewed the instructions with the patient, answering all questions to her satisfaction. Return if symptoms worsen or fail to improve.   Orders Placed This Encounter   Procedures

## 2021-06-08 ENCOUNTER — HOSPITAL ENCOUNTER (OUTPATIENT)
Age: 56
Setting detail: SPECIMEN
Discharge: HOME | End: 2021-06-08
Payer: COMMERCIAL

## 2021-06-08 ENCOUNTER — OFFICE VISIT (OUTPATIENT)
Dept: DERMATOLOGY | Age: 56
End: 2021-06-08
Payer: COMMERCIAL

## 2021-06-08 VITALS
OXYGEN SATURATION: 95 % | BODY MASS INDEX: 23.81 KG/M2 | HEIGHT: 62 IN | DIASTOLIC BLOOD PRESSURE: 67 MMHG | TEMPERATURE: 97.9 F | SYSTOLIC BLOOD PRESSURE: 122 MMHG | WEIGHT: 129.4 LBS | HEART RATE: 113 BPM

## 2021-06-08 DIAGNOSIS — L72.3 INFLAMED EPIDERMOID CYST OF SKIN: Primary | ICD-10-CM

## 2021-06-08 PROCEDURE — 10060 I&D ABSCESS SIMPLE/SINGLE: CPT | Performed by: DERMATOLOGY

## 2021-06-08 RX ORDER — LIDOCAINE HYDROCHLORIDE AND EPINEPHRINE 10; 10 MG/ML; UG/ML
10 INJECTION, SOLUTION INFILTRATION; PERINEURAL ONCE
Status: COMPLETED | OUTPATIENT
Start: 2021-06-08 | End: 2021-06-08

## 2021-06-08 RX ADMIN — LIDOCAINE HYDROCHLORIDE AND EPINEPHRINE 10 ML: 10; 10 INJECTION, SOLUTION INFILTRATION; PERINEURAL at 13:07

## 2021-06-08 NOTE — PROGRESS NOTES
Chief Complaint   Patient presents with    Other     Neck, cyst is inflamed. Pt states that it is oozing, red,  and sore     Patient previously seen by Dr. Homer Gudino for EIC of neck. It has been red, inflamed, and draining since Friday. I&D today and plan for excision in the future    Dermatology Procedure Note   [facility]  [address]  Dept: [phone]  Dept Fax: [phone]      Procedure Date: 6/8/2021  Procedure Time: 8:10 AM    Procedure Practitioner: Loli Montoya MD    Procedure: Incision and drainage    Pre-Procedure Diagnosis: Inflamed epidermoid cyst    Post-Procedure Diagnosis: Same as Pre-Procedure Diagnosis    Informed Consent: The procedure and its risks were explained including but not limited to pain, bleeding, infection, permanent scar, permanent pigment alteration and recurrence. Consent to proceed with the procedure was obtained from the patient or the parent by the practitioner    Time Out:  A time out was conducted immediately before starting the procedure that confirmed a final verification of the correct patient, correct procedure, and correct site. Procedure Details:  Incision and Drainage (I&D)    Risks/benefits of procedure including but not limited to bleeding, infection, scarring discussed. Patient would like to proceed with I&D of lesion. Area prepared in usual fashion with chlorhexidine. Anesthesia was administered as a ring block using 3 mL of 1% lidocaine with 1:100,000 epinephrine. Adequate linear incision over the lesion apex was made with sterile 11 blade. Manual centripetal pressure was applied to thoroughly express lesional contents. Care was taken to ensure there were no loculations; a sterile curette was used to destroy any loculated pockets. Contents were  sent for culture. Defect irrigated with sterile saline. Hemostasis achieved spontaneously and with pressure.  Vaseline and a bulky wound dressing was applied in usual fashion. The patient tolerated the procedure well. There were no immediate complications. Wound care was verbally discussed and included in written format in patient's AVS.    Procedure Performed By: Nisha Fernandez MD    Estimated Blood Loss: Minimal    Pathologic Specimen: Culture    Procedure Tolerance: Good    Complication(s): None    Patient plans to have cyst excised once acute inflammation has resolved.     Electronically signed by Nisha Fernandez MD on 6/8/21 at 8:10 AM EDT

## 2021-06-09 DIAGNOSIS — L72.3 INFLAMED EPIDERMOID CYST OF SKIN: ICD-10-CM

## 2021-06-11 ENCOUNTER — TELEPHONE (OUTPATIENT)
Dept: DERMATOLOGY | Age: 56
End: 2021-06-11

## 2021-06-11 LAB
CULTURE: ABNORMAL
DIRECT EXAM: ABNORMAL
Lab: ABNORMAL
SPECIMEN DESCRIPTION: ABNORMAL

## 2021-06-11 NOTE — RESULT ENCOUNTER NOTE
The culture grew only normal skin bacteria, indicating that the cyst was inflamed but not infected. How are you healing?

## 2021-06-11 NOTE — TELEPHONE ENCOUNTER
----- Message from Mal Goltz, MD sent at 6/11/2021  1:18 PM EDT -----  The culture grew only normal skin bacteria, indicating that the cyst was inflamed but not infected. How are you healing?

## 2021-07-06 ENCOUNTER — OFFICE VISIT (OUTPATIENT)
Dept: NEUROLOGY | Age: 56
End: 2021-07-06
Payer: COMMERCIAL

## 2021-07-06 VITALS
DIASTOLIC BLOOD PRESSURE: 74 MMHG | HEART RATE: 112 BPM | HEIGHT: 62 IN | WEIGHT: 127 LBS | SYSTOLIC BLOOD PRESSURE: 112 MMHG | TEMPERATURE: 97.2 F | BODY MASS INDEX: 23.37 KG/M2

## 2021-07-06 DIAGNOSIS — F32.89 OTHER DEPRESSION: ICD-10-CM

## 2021-07-06 DIAGNOSIS — M48.04 THORACIC SPINAL STENOSIS: ICD-10-CM

## 2021-07-06 DIAGNOSIS — G90.521 COMPLEX REGIONAL PAIN SYNDROME I OF RIGHT LOWER LIMB: Primary | ICD-10-CM

## 2021-07-06 PROCEDURE — 99214 OFFICE O/P EST MOD 30 MIN: CPT | Performed by: NURSE PRACTITIONER

## 2021-07-06 PROCEDURE — 3017F COLORECTAL CA SCREEN DOC REV: CPT | Performed by: NURSE PRACTITIONER

## 2021-07-06 PROCEDURE — G8427 DOCREV CUR MEDS BY ELIG CLIN: HCPCS | Performed by: NURSE PRACTITIONER

## 2021-07-06 PROCEDURE — G8420 CALC BMI NORM PARAMETERS: HCPCS | Performed by: NURSE PRACTITIONER

## 2021-07-06 PROCEDURE — 4004F PT TOBACCO SCREEN RCVD TLK: CPT | Performed by: NURSE PRACTITIONER

## 2021-07-06 RX ORDER — VENLAFAXINE HYDROCHLORIDE 37.5 MG/1
37.5 CAPSULE, EXTENDED RELEASE ORAL DAILY
Qty: 30 CAPSULE | Refills: 11 | Status: SHIPPED | OUTPATIENT
Start: 2021-07-06 | End: 2021-10-01 | Stop reason: SDUPTHER

## 2021-07-06 NOTE — PROGRESS NOTES
[facility]            [address]          [facility], [address] [facility]          Dept: [phone]          Dept Fax: [phone]        MD Stas Blakely MD Rosine Lesser, MD Maryanna Broaden, RACHELLE            7/6/2021      HISTORY OF PRESENT ILLNESS:       I had the pleasure of seeing Gris Muse, who returns for continuing neurologic care. The patient was seen last on January 5, 2021 for treatment of dysesthesias of the right knee and midthoracic back pain. The patient does have dysesthesias of her right knee and does follow with pain management. She notes that she does still have weakness or her right leg. The patient has finally had relief of her severe pain with the implantation of a spinal cord stimulator. She is now able to walk better and have a significant decline in the pain intensity. For management of her midthoracic back pain she recently had an MRI thoracic spine without contrast which remained stable. She does have several disc herniations. She notes that it does begin in the midline of the back and radiates down into her hips. She was chasing a wasp around her house and bent over and then felt a burning sensation in her back. The patient does notice that the pain is aggravated by bending over or twisting. She does get muscle spasms that are infrequent. Testing reviewed:     MRI Thoracic Spine WO Contrast 6/21/2021  Impression:  Overall the MR of the thoracic spine is stable since 8/14/2020. The sizable T9-T10 disc herniation to the left of midline with impingement upon the left anterolateral thecal sac and flattening of the cord is stable. Other smaller disc bulges/protrusions are stable also. MRI Thoracic spine 08/14/2020  Impression: 1.  Large central slightly greater to left of midline disc herniation at T9-10 with cord flattening                      2. Small right paracentral disc protrusions at T7-8 and T9-10     -EMG/NCV study on January 16, 2019 which was normal.  -MRI of the lumbar spine showing an essentially normal exam for stated age. Guadarrama Oneyda bulging L4-L5 disc without contact with neuro structures.  No herniated discs, central canal or foraminal stenosis.  Mild lower lumbar facet joint arthrosis including a small for jet joint cyst as reported,6/18/15    PAST MEDICAL HISTORY:         Diagnosis Date    Complex regional pain syndrome i of right lower limb 10/2/2019    Depression 6/27/2019    Hyperlipidemia     Tachycardia         PAST SURGICAL HISTORY:         Procedure Laterality Date    WISDOM TOOTH EXTRACTION          SOCIAL HISTORY:     Social History     Socioeconomic History    Marital status:      Spouse name: Not on file    Number of children: Not on file    Years of education: Not on file    Highest education level: Not on file   Occupational History    Not on file   Tobacco Use    Smoking status: Current Every Day Smoker     Packs/day: 0.50     Years: 20.00     Pack years: 10.00     Types: Cigarettes    Smokeless tobacco: Never Used   Vaping Use    Vaping Use: Former    Substances: Occasionally   Substance and Sexual Activity    Alcohol use: Yes     Alcohol/week: 0.0 standard drinks     Comment: rarely    Drug use: No    Sexual activity: Not on file   Other Topics Concern    Not on file   Social History Narrative    Not on file     Social Determinants of Health     Financial Resource Strain: Low Risk     Difficulty of Paying Living Expenses: Not hard at all   Food Insecurity: No Food Insecurity    Worried About [address] in the Last Year: Never true    [facility] in the Last Year: Never true   Transportation Needs:     Lack of Transportation (Medical):      Lack of Transportation (Non-Medical):    Physical Activity:     Days of Exercise per Week:     Minutes of Exercise per Session:    Stress:     Feeling of Stress :    Social Connections:     Frequency of Communication with Friends and Family:     Frequency of Social Gatherings with Friends and Family:     Attends [employer] Services:     Active Member of Clubs or Organizations:     Attends Club or Organization Meetings:     Marital Status:    Intimate Partner Violence:     Fear of Current or Ex-Partner:     Emotionally Abused:     Physically Abused:     Sexually Abused:        CURRENT MEDICATIONS:     Current Outpatient Medications   Medication Sig Dispense Refill    venlafaxine (EFFEXOR XR) 37.5 MG extended release capsule Take 1 capsule by mouth daily 30 capsule 11    simvastatin (ZOCOR) 80 MG tablet TAKE ONE TABLET BY MOUTH EVERY NIGHT AT BEDTIME 30 tablet 4    celecoxib (CELEBREX) 200 MG capsule TAKE ONE CAPSULE BY MOUTH DAILY 30 capsule 4    cyclobenzaprine (FLEXERIL) 10 MG tablet TAKE ONE TABLET BY MOUTH THREE TIMES A DAY AS NEEDED FOR MUSCLE SPASMS 30 tablet 5    doxyLAMINE succinate (GNP SLEEP AID) 25 MG tablet Take 25 mg by mouth      oxyCODONE-acetaminophen (PERCOCET) 5-325 MG per tablet Percocet 5 mg-325 mg tablet   Take 1 tablet as needed by oral route. (Patient not taking: Reported on 7/6/2021)       No current facility-administered medications for this visit. ALLERGIES:     Allergies   Allergen Reactions    Trazodone And Nefazodone     Nucynta [Tapentadol] Nausea And Vomiting                                 REVIEW OF SYSTEMS        All items selected indicate a positive finding. Those items not selected are negative.   Constitutional [] Weight loss/gain   [] Fatigue  [] Fever/Chills   HEENT [] Hearing Loss  [] Visual Disturbance  [] Tinnitus  [] Eye pain   Respiratory [] Shortness of Breath  [] Cough  [] Snoring   Cardiovascular [] Chest Pain  [] Palpitations  [] Lightheaded   GI [] Constipation  [] Diarrhea  [] Swallowing change  [] Nausea/vomiting    [] Urinary Frequency  [] Urinary Urgency   Musculoskeletal [] Neck pain  [x] Back pain  [] Muscle pain  [] Restless legs   Dermatologic [] Skin changes   Neurologic [] Memory loss/confusion  [] Seizures  [x] Trouble walking or imbalance  [] Dizziness  [] Sleep disturbance  [x] Weakness  [] Numbness  [] Tremors  [] Speech Difficulty  [] Headaches  [] Light Sensitivity  [] Sound Sensitivity   Endocrinology []Excessive thirst  []Excessive hunger   Psychiatric [x] Anxiety/Depression  [] Hallucination   Allergy/immunology []Hives/environmental allergies   Hematologic/lymph [] Abnormal bleeding  [] Abnormal bruising         PHYSICAL EXAMINATION:       Vitals:    07/06/21 1128   BP: 112/74   Pulse: 112   Temp: 97.2 °F (36.2 °C)                                              .                                                                                                     General Appearance:  Alert, cooperative, no signs of distress, appears stated age   Head:  Normocephalic, no signs of trauma   Eyes:  Conjunctiva/corneas clear;  eyelids intact   Ears:  Normal external ear and canals   Nose: Nares normal, mucosa normal, no drainage    Throat: Lips and tongue normal; teeth normal;  gums normal   Neck: Supple, intact flexion, extension and rotation;   trachea midline;  no adenopathy;   thyroid: not enlarged;   no carotid pulse abnormality   Back:   Symmetric, no curvature, ROM adequate   Lungs:   Respirations unlabored   Heart:  Regular rate and rhythm           Extremities: Extremities normal, no cyanosis, no edema   Pulses: Symmetric over head and neck   Skin: Skin color, texture normal, no rashes, no lesions                                     NEUROLOGIC EXAMINATION    Neurologic Exam  Mental status    Alert and oriented x 3; intact memory with no confusion, speech or language problems; no hallucinations or delusions  Fund of information appropriate for level of education    Cranial nerves    II - visual fields intact to confrontation bilaterally  III, IV, VI - extra-ocular muscles full: no pupillary defect; no NELIDA, no nystagmus, no ptosis   V - normal facial sensation                                                               VII - normal facial symmetry                                                             VIII - intact hearing                                                                             IX, X - symmetrical palate                                                                  XI - symmetrical shoulder shrug                                                       XII - tongue midline without atrophy or fasciculation      Motor function  Normal muscle bulk and tone; strength 5/5 on all 4 extremities, no pronator drift      Sensory function Intact to light touch, pinprick, vibration, proprioception on all 4 extremities      Cerebellar Intact fine motor movement. No involuntary movements or tremors. No ataxia or dysmetria on finger to nose or heel to shin testing      Reflex function DTR 2+ on bilateral UE and LE, symmetric. Negative Babinski      Gait                   normal base and arm swing                 Medical Decision Making: In summary, your patient, Antonio Collins exhibits the following, with associated plan:    1. Dysesthesias of the right knee which is chronic since an injury in 2013.  She was previously diagnosed with complex regional pain syndrome  1. Continue to follow with pain management  2. Return in follow-up in 6 months  2. Depression  1. Continue Effexor 37.5 mg daily for treatment of depression  3. Midthoracic back pain, due to disc herniations and cord flattening of the thoracic cord. The patient does not display any signs of myelopathy. 1. Patient to continue to use lidocaine patch.  If she needs a new prescription, she will contact the office.  She was also advised that she could use salon pas patches  2. Continue Flexeril 10mg TID, but patient was advised to use it daily until she is able to get used to it so that it does not cause sedation.

## 2021-08-24 ENCOUNTER — PROCEDURE VISIT (OUTPATIENT)
Dept: DERMATOLOGY | Age: 56
End: 2021-08-24
Payer: COMMERCIAL

## 2021-08-24 ENCOUNTER — HOSPITAL ENCOUNTER (OUTPATIENT)
Age: 56
Setting detail: SPECIMEN
Discharge: HOME | End: 2021-08-24
Payer: COMMERCIAL

## 2021-08-24 VITALS
DIASTOLIC BLOOD PRESSURE: 74 MMHG | HEIGHT: 62 IN | WEIGHT: 129 LBS | HEART RATE: 106 BPM | SYSTOLIC BLOOD PRESSURE: 131 MMHG | BODY MASS INDEX: 23.74 KG/M2

## 2021-08-24 DIAGNOSIS — L72.3 INFLAMED EPIDERMOID CYST OF SKIN: Primary | ICD-10-CM

## 2021-08-24 PROCEDURE — 12041 INTMD RPR N-HF/GENIT 2.5CM/<: CPT | Performed by: DERMATOLOGY

## 2021-08-24 PROCEDURE — 11423 EXC H-F-NK-SP B9+MARG 2.1-3: CPT | Performed by: DERMATOLOGY

## 2021-08-24 RX ORDER — LIDOCAINE HYDROCHLORIDE AND EPINEPHRINE 10; 10 MG/ML; UG/ML
5 INJECTION, SOLUTION INFILTRATION; PERINEURAL ONCE
Status: COMPLETED | OUTPATIENT
Start: 2021-08-24 | End: 2021-08-24

## 2021-08-24 RX ADMIN — LIDOCAINE HYDROCHLORIDE AND EPINEPHRINE 5 ML: 10; 10 INJECTION, SOLUTION INFILTRATION; PERINEURAL at 13:56

## 2021-08-24 NOTE — PATIENT INSTRUCTIONS
TIANA/Compression stocking is applied, leave it in place until the morning. Following the dressing change, reapply the stocking and leave it on during the day. Remove the stocking at bedtime. Continue to wear the stocking until the sutures are removed. 9. During your surgery, your surgeon may have used an underlying layer of sutures, which are normally absorbed by the body. In some instances, the suture material may not be absorbed and you may need to come back for a follow-up visit. If you notice an acne-like bump or pimple forming along or in the suture line, as late as 2-8 weeks after your surgery, it may be the absorbable suture not being absorbed, and we would like you to call the office and make an appointment. An additional result of the procedure may be a slight raising or puckering at the edges of the surgical area due to overzealous healing at the surgery site. This may occur several weeks after the sutures have been removed. If you notice this, please call our office and make an appointment.        Additional comments or prescriptions:_____________________________________________________  Suture removal appointment Date & Time__________________________________________________  For emergencies, please call the office

## 2021-08-24 NOTE — PROGRESS NOTES
Dermatology Surgery Pre-Visit Checklist    (Please complete with  Y (yes) / N (no) or explain)    Pathologic Diagnosis: cyst    Photo available of surgery site in media: na    Competent to give informed consent?  yes   If not, who is authorized to do so: na    Need for help for wound care: no   If so, who will do so: na    Are you diagnosed:   Diabetes: no   If yes, last A1C: na       HIV: no       Hepatitis: no       Current smoker: yes  If yes, how much: 1/2 pack    So you have any of the following: Pacemaker: no       Defibrillator: no       Artificial Heart valve: no                Artificial Joints: no       Other Implantable Device: no       Organ Transplant: no       Other: spinal cord stimulator    Are you on blood thinners such as: Asprin: no       Warfarin/Coumadin: no       Other: no    Any allergies to the following:  Lidocaine: no       Iodine: no       Adhesive: no       Other: trazodone, nycynta

## 2021-08-27 LAB — DERMATOLOGY PATHOLOGY REPORT: NORMAL

## 2021-08-27 NOTE — RESULT ENCOUNTER NOTE
Please inform patient that his excision showed a cyst as expected. Let me know if he/she has any questions or concerns about healing.

## 2021-09-30 DIAGNOSIS — G89.29 CHRONIC BILATERAL THORACIC BACK PAIN: ICD-10-CM

## 2021-09-30 DIAGNOSIS — E78.5 HYPERLIPIDEMIA, UNSPECIFIED HYPERLIPIDEMIA TYPE: ICD-10-CM

## 2021-09-30 DIAGNOSIS — M54.6 CHRONIC BILATERAL THORACIC BACK PAIN: ICD-10-CM

## 2021-09-30 DIAGNOSIS — G62.9 NEUROPATHY: ICD-10-CM

## 2021-09-30 NOTE — TELEPHONE ENCOUNTER
Andre Trevino is calling to request a refill on the following medication(s):    Last Visit Date (If Applicable):  0/4/0757    Next Visit Date:    Visit date not found    Medication Request:  Requested Prescriptions     Pending Prescriptions Disp Refills    celecoxib (CELEBREX) 200 MG capsule 90 capsule 0     Sig: Take 1 capsule by mouth daily    simvastatin (ZOCOR) 80 MG tablet 90 tablet 0     Sig: Take 1 tablet by mouth nightly

## 2021-10-01 RX ORDER — SIMVASTATIN 80 MG
80 TABLET ORAL NIGHTLY
Qty: 90 TABLET | Refills: 0 | Status: SHIPPED | OUTPATIENT
Start: 2021-10-01 | End: 2021-12-29

## 2021-10-01 RX ORDER — CELECOXIB 200 MG/1
200 CAPSULE ORAL DAILY
Qty: 90 CAPSULE | Refills: 0 | Status: SHIPPED | OUTPATIENT
Start: 2021-10-01 | End: 2021-12-29

## 2021-10-01 NOTE — TELEPHONE ENCOUNTER
Patient requesting refill of Effexor 37.5 mg.      Medication active on med list yes      Date of last Rx: 7/6/21  with 11 refills verified on 10/1/21   verified by ISABELLA KO      Date of last appointment 7/6/21    Next Visit Date:  1/6/2022    **Previous Rx sent to [facility], pt switched pharmacies.

## 2021-10-04 RX ORDER — VENLAFAXINE HYDROCHLORIDE 37.5 MG/1
37.5 CAPSULE, EXTENDED RELEASE ORAL DAILY
Qty: 90 CAPSULE | Refills: 2 | Status: SHIPPED | OUTPATIENT
Start: 2021-10-04 | End: 2022-05-24 | Stop reason: ALTCHOICE

## 2021-12-27 DIAGNOSIS — G89.29 CHRONIC BILATERAL THORACIC BACK PAIN: ICD-10-CM

## 2021-12-27 DIAGNOSIS — M54.6 CHRONIC BILATERAL THORACIC BACK PAIN: ICD-10-CM

## 2021-12-27 NOTE — TELEPHONE ENCOUNTER
Chandler Jefferson is calling to request a refill on the following medication(s):    Last Visit Date (If Applicable):  3/1/0037    Next Visit Date:    1/3/2022    Medication Request:  Requested Prescriptions     Pending Prescriptions Disp Refills    celecoxib (CELEBREX) 200 MG capsule [Pharmacy Med Name: CELECOXIB CAPS 200MG] 90 capsule 3     Sig: TAKE 1 CAPSULE DAILY

## 2021-12-29 DIAGNOSIS — E78.5 HYPERLIPIDEMIA, UNSPECIFIED HYPERLIPIDEMIA TYPE: ICD-10-CM

## 2021-12-29 DIAGNOSIS — G62.9 NEUROPATHY: ICD-10-CM

## 2021-12-29 RX ORDER — SIMVASTATIN 80 MG
TABLET ORAL
Qty: 90 TABLET | Refills: 3 | Status: SHIPPED | OUTPATIENT
Start: 2021-12-29

## 2021-12-29 RX ORDER — CELECOXIB 200 MG/1
CAPSULE ORAL
Qty: 90 CAPSULE | Refills: 3 | Status: SHIPPED | OUTPATIENT
Start: 2021-12-29

## 2021-12-29 NOTE — TELEPHONE ENCOUNTER
Andre Trevino is calling to request a refill on the following medication(s):    Last Visit Date (If Applicable):  6/6/1840    Next Visit Date:    1/3/2022    Medication Request:  Requested Prescriptions     Pending Prescriptions Disp Refills    simvastatin (ZOCOR) 80 MG tablet [Pharmacy Med Name: SIMVASTATIN TABS 80MG] 90 tablet 3     Sig: TAKE 1 TABLET NIGHTLY

## 2022-01-03 ENCOUNTER — HOSPITAL ENCOUNTER (OUTPATIENT)
Age: 57
Setting detail: SPECIMEN
Discharge: HOME | End: 2022-01-03

## 2022-01-03 ENCOUNTER — OFFICE VISIT (OUTPATIENT)
Dept: FAMILY MEDICINE CLINIC | Age: 57
End: 2022-01-03
Payer: COMMERCIAL

## 2022-01-03 VITALS
BODY MASS INDEX: 23.3 KG/M2 | HEIGHT: 62 IN | WEIGHT: 126.6 LBS | HEART RATE: 129 BPM | SYSTOLIC BLOOD PRESSURE: 127 MMHG | DIASTOLIC BLOOD PRESSURE: 60 MMHG | OXYGEN SATURATION: 97 %

## 2022-01-03 DIAGNOSIS — R31.21 ASYMPTOMATIC MICROSCOPIC HEMATURIA: Primary | ICD-10-CM

## 2022-01-03 DIAGNOSIS — Z12.11 SCREEN FOR COLON CANCER: ICD-10-CM

## 2022-01-03 DIAGNOSIS — Z12.31 SCREENING MAMMOGRAM FOR BREAST CANCER: ICD-10-CM

## 2022-01-03 DIAGNOSIS — G89.29 CHRONIC BILATERAL THORACIC BACK PAIN: ICD-10-CM

## 2022-01-03 DIAGNOSIS — F32.89 OTHER DEPRESSION: ICD-10-CM

## 2022-01-03 DIAGNOSIS — M25.50 ARTHRALGIA, UNSPECIFIED JOINT: ICD-10-CM

## 2022-01-03 DIAGNOSIS — R31.21 ASYMPTOMATIC MICROSCOPIC HEMATURIA: ICD-10-CM

## 2022-01-03 DIAGNOSIS — M54.6 CHRONIC BILATERAL THORACIC BACK PAIN: ICD-10-CM

## 2022-01-03 DIAGNOSIS — M54.2 CERVICAL PAIN (NECK): ICD-10-CM

## 2022-01-03 PROCEDURE — 99214 OFFICE O/P EST MOD 30 MIN: CPT | Performed by: FAMILY MEDICINE

## 2022-01-03 PROCEDURE — G8484 FLU IMMUNIZE NO ADMIN: HCPCS | Performed by: FAMILY MEDICINE

## 2022-01-03 PROCEDURE — 3017F COLORECTAL CA SCREEN DOC REV: CPT | Performed by: FAMILY MEDICINE

## 2022-01-03 PROCEDURE — 4004F PT TOBACCO SCREEN RCVD TLK: CPT | Performed by: FAMILY MEDICINE

## 2022-01-03 PROCEDURE — G8427 DOCREV CUR MEDS BY ELIG CLIN: HCPCS | Performed by: FAMILY MEDICINE

## 2022-01-03 PROCEDURE — G8420 CALC BMI NORM PARAMETERS: HCPCS | Performed by: FAMILY MEDICINE

## 2022-01-03 RX ORDER — BUPRENORPHINE HYDROCHLORIDE 450 UG/1
FILM, SOLUBLE BUCCAL
COMMUNITY
Start: 2021-12-31 | End: 2022-07-07 | Stop reason: ALTCHOICE

## 2022-01-03 ASSESSMENT — PATIENT HEALTH QUESTIONNAIRE - PHQ9
SUM OF ALL RESPONSES TO PHQ QUESTIONS 1-9: 0
2. FEELING DOWN, DEPRESSED OR HOPELESS: 0
SUM OF ALL RESPONSES TO PHQ9 QUESTIONS 1 & 2: 0
SUM OF ALL RESPONSES TO PHQ QUESTIONS 1-9: 0
1. LITTLE INTEREST OR PLEASURE IN DOING THINGS: 0

## 2022-01-03 NOTE — PROGRESS NOTES
[facility]  [address] [place] [address] [address] [facility] [address]  Dept: [phone]      Te Hernandez is a 64 y.o. female who presents today for follow up on her  medical conditions as noted below. Chief Complaint   Patient presents with    Other     Talk about recent specialist appointment        Patient Active Problem List:     Hyperlipidemia     Cervical pain (neck)     Hypercholesterolemia     Dysesthesia     Depression     Complex regional pain syndrome i of right lower limb     Thoracic spinal stenosis     Past Medical History:   Diagnosis Date    Complex regional pain syndrome i of right lower limb 10/2/2019    Depression 6/27/2019    Hyperlipidemia     Tachycardia       Past Surgical History:   Procedure Laterality Date    WISDOM TOOTH EXTRACTION       Family History   Adopted: Yes   Family history unknown: Yes       Current Outpatient Medications   Medication Sig Dispense Refill    BELBUCA 450 MCG FILM       celecoxib (CELEBREX) 200 MG capsule TAKE 1 CAPSULE DAILY 90 capsule 3    simvastatin (ZOCOR) 80 MG tablet TAKE 1 TABLET NIGHTLY 90 tablet 3    venlafaxine (EFFEXOR XR) 37.5 MG extended release capsule Take 1 capsule by mouth daily 90 capsule 2    cyclobenzaprine (FLEXERIL) 10 MG tablet TAKE ONE TABLET BY MOUTH THREE TIMES A DAY AS NEEDED FOR MUSCLE SPASMS 30 tablet 5    doxyLAMINE succinate (GNP SLEEP AID) 25 MG tablet Take 25 mg by mouth      oxyCODONE-acetaminophen (PERCOCET) 5-325 MG per tablet Percocet 5 mg-325 mg tablet   Take 1 tablet as needed by oral route. (Patient not taking: Reported on 1/3/2022)       No current facility-administered medications for this visit.      ALLERGIES:    Allergies   Allergen Reactions    Trazodone And Nefazodone     Nucynta [Tapentadol] Nausea And Vomiting       Social History     Tobacco Use    Smoking status: Current Every Day Smoker     Packs/day: 0.50     Years: 20.00     Pack years: 10.00 Types: Cigarettes    Smokeless tobacco: Never Used   Substance Use Topics    Alcohol use: Yes     Alcohol/week: 0.0 standard drinks     Comment: rarely        LDL Calculated (mg/dL)   Date Value   02/25/2020 140     HDL (mg/dL)   Date Value   02/25/2020 39     BUN (mg/dL)   Date Value   03/02/2021 18     CREATININE (mg/dL)   Date Value   03/02/2021 0.71     Glucose (mg/dL)   Date Value   03/02/2021 77     Hemoglobin A1C (%)   Date Value   02/25/2020 5.6              Subjective:      HPI  She is being seen today for follow-up from all of her specialist she has had ongoing chronic pain both in her spine and knees she generally does not feel well she states she has seen rheumatology who think she has fibromyalgia on evaluation she was found to have hematuria she saw urology who wants to do a cystoscope  She is seeing orthopedic spine who does not think she is quite ready for orthopedic surgery yet  She had a sleep study done that showed she does have some sleep apnea she only sleeps about 3 hours a night  She is still having ongoing issues with depression she did see counseling a while back who told her 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 Family social and medical history reviewed and unchanged     HENT: Negative. Negative for nosebleeds, trouble swallowing and neck pain. Eyes: Negative for photophobia and visual disturbance. Respiratory: Negative. Negative for chest tightness and shortness of breath. Cardiovascular: Negative. Negative for chest pain and leg swelling. Gastrointestinal: Negative. Negative for abdominal pain and blood in stool. Endocrine: Negative for cold intolerance and polyuria. Genitourinary: Negative for dysuria and hematuria. Musculoskeletal: Negative. Skin: Negative for rash. Allergic/Immunologic: Negative. Neurological: Negative. Negative for dizziness, weakness and numbness. Hematological: Negative. Negative for adenopathy. Does not bruise/bleed easily. Psychiatric/Behavioral: Negative for sleep disturbance, dysphoric mood and  decreased concentration. The patient is not nervous/anxious. Objective:     Physical Exam:     Nursing note and vitals reviewed. /60   Pulse 129   Ht 5' 2\" (1.575 m)   Wt 126 lb 9.6 oz (57.4 kg)   LMP 03/14/2013   SpO2 97%   BMI 23.16 kg/m²   Constitutional: She is oriented to person, place, and time. She   appears well-developed and well-nourished. HENT:   Head: Normocephalic and atraumatic. Right Ear: External ear normal. Tympanic membrane is not erythematous. No middle ear effusion. Left Ear: External ear normal. Tympanic membrane is not erythematous. No middle ear effusion. Nose: No mucosal edema. Mouth/Throat: Oropharynx is clear and moist. No posterior oropharyngeal erythema. Eyes: Conjunctivae and EOM are normal. Pupils are equal, round, and reactive to light. Neck: Normal range of motion. Neck supple. No thyromegaly present. Cardiovascular: Normal rate, regular rhythm and normal heart sounds. No murmur heard. Pulmonary/Chest: Effort normal and breath sounds normal. She has no wheezes. Shehas no rales. Abdominal: Soft. Bowel sounds are normal. She exhibits no distension and no mass. There is no tenderness. There is no rebound and no guarding. Genitourinary/Anorectal:deferred  Musculoskeletal: Normal range of motion. She exhibits no edema or tenderness. Lymphadenopathy: She has no cervical adenopathy. Neurological: She is alert and oriented to person, place, and time. She has normal reflexes. Skin: Skin is warm and dry. No rash noted. Psychiatric: She has a normal mood and affect. Her   behavior is normal.       Assessment:      1. Asymptomatic microscopic hematuria    2. Chronic bilateral thoracic back pain    3. Arthralgia, unspecified joint    4. Cervical pain (neck)    5. Other depression    6. Screening mammogram for breast cancer    7. Screen for colon cancer          Plan:      Call or return to clinic prn if these symptoms worsen or fail to improve as anticipated. I have reviewed the instructions with the patient, answering all questions to her satisfaction. Return if symptoms worsen or fail to improve. Orders Placed This Encounter   Procedures    Fecal DNA Colorectal cancer screening (Cologuard)     This test is performed by an external laboratory and is used for result attachment only. It is required that this order requisition be faxed to: [employer] @ [phone]. See [email or web address] for further information.  ALEKSANDAR DIGITAL SCREEN W OR WO CAD BILATERAL     Standing Status:   Future     Standing Expiration Date:   3/3/2023     Order Specific Question:   Reason for exam:     Answer:   screen    Urinalysis With Microscopic     Standing Status:   Future     Standing Expiration Date:   1/3/2023     Order Specific Question:   SPECIFY(EX-CATH,MIDSTREAM,CYSTO,ETC)? Answer:   hematuria    [facility]     Referral Priority:   Routine     Referral Type:   Eval and Treat     Referral Reason:   Specialty Services Required     Requested Specialty:   Physical Therapy     Number of Visits Requested:   1     No orders of the defined types were placed in this encounter.     Discussed with patient she should recheck urine go for functional capacity exam refer to [address] [place] [address] her routine tests including Cologuard and mammogram continue seeing specialist and follow-up if not improving electronically signed by Jose Mccabe DO on 1/3/2022 at 9:55 AM

## 2022-01-04 DIAGNOSIS — R82.90 ABNORMAL FINDING IN URINE: Primary | ICD-10-CM

## 2022-01-04 LAB
-: ABNORMAL
AMORPHOUS: ABNORMAL
BACTERIA: ABNORMAL
BILIRUBIN URINE: NEGATIVE
CASTS UA: ABNORMAL /LPF (ref 0–2)
CASTS UA: ABNORMAL /LPF (ref 0–2)
COLOR: YELLOW
CRYSTALS, UA: ABNORMAL /HPF
CRYSTALS, UA: ABNORMAL /HPF
EPITHELIAL CELLS UA: ABNORMAL /HPF (ref 0–5)
GLUCOSE URINE: NEGATIVE
KETONES, URINE: ABNORMAL
LEUKOCYTE ESTERASE, URINE: ABNORMAL
MUCUS: ABNORMAL
NITRITE, URINE: NEGATIVE
OTHER OBSERVATIONS UA: ABNORMAL
PH UA: 6.5 (ref 5–8)
PROTEIN UA: ABNORMAL
RBC UA: ABNORMAL /HPF (ref 0–2)
RENAL EPITHELIAL, UA: ABNORMAL /HPF
SPECIFIC GRAVITY UA: 1.02 (ref 1–1.03)
TRICHOMONAS: ABNORMAL
TURBIDITY: CLEAR
URINE HGB: ABNORMAL
UROBILINOGEN, URINE: NORMAL
WBC UA: ABNORMAL /HPF (ref 0–5)
YEAST: ABNORMAL

## 2022-01-06 ENCOUNTER — OFFICE VISIT (OUTPATIENT)
Dept: NEUROLOGY | Age: 57
End: 2022-01-06
Payer: COMMERCIAL

## 2022-01-06 VITALS
SYSTOLIC BLOOD PRESSURE: 111 MMHG | HEIGHT: 62 IN | TEMPERATURE: 96.6 F | DIASTOLIC BLOOD PRESSURE: 69 MMHG | WEIGHT: 125.4 LBS | BODY MASS INDEX: 23.08 KG/M2 | HEART RATE: 116 BPM

## 2022-01-06 DIAGNOSIS — G90.521 COMPLEX REGIONAL PAIN SYNDROME I OF RIGHT LOWER LIMB: ICD-10-CM

## 2022-01-06 DIAGNOSIS — N30.00 ACUTE CYSTITIS WITHOUT HEMATURIA: Primary | ICD-10-CM

## 2022-01-06 DIAGNOSIS — R20.8 DYSESTHESIA: ICD-10-CM

## 2022-01-06 DIAGNOSIS — F32.A DEPRESSION, UNSPECIFIED DEPRESSION TYPE: ICD-10-CM

## 2022-01-06 DIAGNOSIS — M48.04 THORACIC SPINAL STENOSIS: Primary | ICD-10-CM

## 2022-01-06 LAB
CULTURE: ABNORMAL
CULTURE: ABNORMAL
Lab: ABNORMAL
SPECIMEN DESCRIPTION: ABNORMAL

## 2022-01-06 PROCEDURE — 3017F COLORECTAL CA SCREEN DOC REV: CPT | Performed by: NURSE PRACTITIONER

## 2022-01-06 PROCEDURE — 99214 OFFICE O/P EST MOD 30 MIN: CPT | Performed by: NURSE PRACTITIONER

## 2022-01-06 PROCEDURE — G8484 FLU IMMUNIZE NO ADMIN: HCPCS | Performed by: NURSE PRACTITIONER

## 2022-01-06 PROCEDURE — 4004F PT TOBACCO SCREEN RCVD TLK: CPT | Performed by: NURSE PRACTITIONER

## 2022-01-06 PROCEDURE — G8427 DOCREV CUR MEDS BY ELIG CLIN: HCPCS | Performed by: NURSE PRACTITIONER

## 2022-01-06 PROCEDURE — G8420 CALC BMI NORM PARAMETERS: HCPCS | Performed by: NURSE PRACTITIONER

## 2022-01-06 RX ORDER — CIPROFLOXACIN 500 MG/1
500 TABLET, FILM COATED ORAL 2 TIMES DAILY
Qty: 20 TABLET | Refills: 0 | Status: SHIPPED | OUTPATIENT
Start: 2022-01-06 | End: 2022-01-12

## 2022-01-06 NOTE — PROGRESS NOTES
[facility]            [address]          [facility], [address] [facility]          Dept: [phone]          Dept Fax: [phone]    E. Purnell Baseman, MD Serene Boeck, MD Ahmed B. Abbey Arnt, MD Christella Pickle, MD Darla Nam, CNP            1/6/2022      HISTORY OF PRESENT ILLNESS:       I had the pleasure of seeing Yasmin Donovan, who returns for continuing neurologic care. The patient was seen last on July 6, 2021 for treatment of complex regional pain syndrome, depression, and midthoracic back pain. The patient has complex regional pain syndrome and dysesthesias of the right knee and follows with pain management. The patient also has a spinal cord stimulator implanted. She notes that she has noticed improvement in her complex regional pain syndrome. She was recently seen by rheumatology who diagnosed her with fibromyalgia. She is able to walk for approximately thirty minutes with the use of her spinal cord stimulator before she considerable pain. . She also recently completed an VIRAL which was positive, follow up testing revealed it to likely be a false positive. She recently underwent a baseline diagnostic sleep study which was consistent with airway resistance syndrome and was prescribed a CPAP machine for management. She has not received her CPAP machine prior to today's visit. For management of her depression effexor 37.5 mg daily. For management of her midthoracic back pain due to disc herniations and cord flattening of the thoracic cord she uses a lidocaine patch and flexeril 10 mg three times daily. She also follows with pain management. She was recently evaluated by Dr. Refugio Santiago, orthopedic surgery, who reports that surgical intervention is not warranted until she shows more neurologic symptoms.  She recently got epidural steroid injections from Dr. Tahir Mendez, pain management and was also started on belbuca which is in the process of being titrated up. She has been using flexeril which provides her with mild relief of her back pain, her lidocaine patches do not provide her with relief. Testing reviewed:    MRI Thoracic Spine WO Contrast 6/21/2021  Impression:  Overall the MR of the thoracic spine is stable since 8/14/2020. The sizable T9-T10 disc herniation to the left of midline with impingement upon the left anterolateral thecal sac and flattening of the cord is stable. Other smaller disc bulges/protrusions are stable also.      MRI Thoracic spine 08/14/2020  Impression: 1. Large central slightly greater to left of midline disc herniation at T9-10 with cord flattening                      2. Small right paracentral disc protrusions at T7-8 and T9-10     -EMG/NCV study on January 16, 2019 which was normal.  -MRI of the lumbar spine showing an essentially normal exam for stated age. Jacinto Highman bulging L4-L5 disc without contact with neuro structures.  No herniated discs, central canal or foraminal stenosis.  Mild lower lumbar facet joint arthrosis including a small for jet joint cyst as reported,6/18/15      PAST MEDICAL HISTORY:         Diagnosis Date    Complex regional pain syndrome i of right lower limb 10/2/2019    Depression 6/27/2019    Hyperlipidemia     Tachycardia         PAST SURGICAL HISTORY:         Procedure Laterality Date    WISDOM TOOTH EXTRACTION          SOCIAL HISTORY:     Social History     Socioeconomic History    Marital status:      Spouse name: Not on file    Number of children: Not on file    Years of education: Not on file    Highest education level: Not on file   Occupational History    Not on file   Tobacco Use    Smoking status: Current Every Day Smoker     Packs/day: 0.50     Years: 20.00     Pack years: 10.00     Types: Cigarettes    Smokeless tobacco: Never Used   Vaping Use    Vaping Use: Former    Substances: Occasionally   Substance and Sexual Activity    Alcohol use:  Yes     Alcohol/week: 0.0 standard drinks     Comment: rarely    Drug use: No    Sexual activity: Not on file   Other Topics Concern    Not on file   Social History Narrative    Not on file     Social Determinants of Health     Financial Resource Strain: Low Risk     Difficulty of Paying Living Expenses: Not hard at all   Food Insecurity: No Food Insecurity    Worried About Running Out of Food in the Last Year: Never true    [address] in the Last Year: Never true   Transportation Needs:     Lack of Transportation (Medical): Not on file    Lack of Transportation (Non-Medical):  Not on file   Physical Activity:     Days of Exercise per Week: Not on file    Minutes of Exercise per Session: Not on file   Stress:     Feeling of Stress : Not on file   Social Connections:     Frequency of Communication with Friends and Family: Not on file    Frequency of Social Gatherings with Friends and Family: Not on file    Attends Religion Services: Not on file    Active Member of [address] [place] [address] or Organizations: Not on file    Attends Club or Organization Meetings: Not on file    Marital Status: Not on file   Intimate Partner Violence:     Fear of Current or Ex-Partner: Not on file    Emotionally Abused: Not on file    Physically Abused: Not on file    Sexually Abused: Not on file   Housing Stability:     Unable to Pay for Housing in the Last Year: Not on file    Number of [place] in the Last Year: Not on file    Unstable Housing in the Last Year: Not on file       CURRENT MEDICATIONS:     Current Outpatient Medications   Medication Sig Dispense Refill    BELBUCA 450 MCG FILM       celecoxib (CELEBREX) 200 MG capsule TAKE 1 CAPSULE DAILY 90 capsule 3    simvastatin (ZOCOR) 80 MG tablet TAKE 1 TABLET NIGHTLY (Patient taking differently: Take 40 mg by mouth 1/2 tab) 90 tablet 3    venlafaxine (EFFEXOR XR) 37.5 MG extended release capsule Take 1 capsule by mouth daily 90 capsule 2    cyclobenzaprine (FLEXERIL) 10 MG tablet TAKE ONE TABLET BY MOUTH THREE TIMES A DAY AS NEEDED FOR MUSCLE SPASMS 30 tablet 5    doxyLAMINE succinate (GNP SLEEP AID) 25 MG tablet Take 25 mg by mouth       No current facility-administered medications for this visit. ALLERGIES:     Allergies   Allergen Reactions    Trazodone And Nefazodone     Nucynta [Tapentadol] Nausea And Vomiting                                 REVIEW OF SYSTEMS        All items selected indicate a positive finding. Those items not selected are negative. Constitutional [] Weight loss/gain   [] Fatigue  [] Fever/Chills   HEENT [] Hearing Loss  [] Visual Disturbance  [] Tinnitus  [] Eye pain   Respiratory [] Shortness of Breath  [] Cough  [] Snoring   Cardiovascular [] Chest Pain  [] Palpitations  [] Lightheaded   GI [] Constipation  [] Diarrhea  [] Swallowing change  [] Nausea/vomiting    [] Urinary Frequency  [] Urinary Urgency   Musculoskeletal [] Neck pain  [x] Back pain  [] Muscle pain  [] Restless legs   Dermatologic [] Skin changes   Neurologic [] Memory loss/confusion  [] Seizures  [x] Trouble walking or imbalance  [] Dizziness  [] Sleep disturbance  [] Weakness  [] Numbness  [] Tremors  [] Speech Difficulty  [] Headaches  [] Light Sensitivity  [] Sound Sensitivity   Endocrinology []Excessive thirst  []Excessive hunger   Psychiatric [x] Anxiety/Depression  [] Hallucination   Allergy/immunology []Hives/environmental allergies   Hematologic/lymph [] Abnormal bleeding  [] Abnormal bruising         PHYSICAL EXAMINATION:       Vitals:    01/06/22 1107   BP: 111/69   Pulse: 116   Temp: 96.6 °F (35.9 °C)                                              .                                                                                                     General Appearance:  Alert, cooperative, no signs of distress, appears stated age   Head:  Normocephalic, no signs of trauma   Eyes:  Conjunctiva/corneas clear;  eyelids intact   Ears:  Normal external ear and canals   Nose: Nares normal, mucosa normal, no drainage    Throat: Lips and tongue normal; teeth normal;  gums normal   Neck: Supple, intact flexion, extension and rotation;   trachea midline;  no adenopathy;   thyroid: not enlarged;   no carotid pulse abnormality   Back:   Symmetric, no curvature, ROM adequate   Lungs:   Respirations unlabored   Heart:  Regular rate and rhythm           Extremities: Extremities normal, no cyanosis, no edema   Pulses: Symmetric over head and neck   Skin: Skin color, texture normal, no rashes, no lesions                                     NEUROLOGIC EXAMINATION    Neurologic Exam  Mental status    Alert and oriented x 3; intact memory with no confusion, speech or language problems; no hallucinations or delusions  Fund of information appropriate for level of education    Cranial nerves    II - visual fields intact to confrontation bilaterally  III, IV, VI - extra-ocular muscles full: no pupillary defect; no NELIDA, no nystagmus, no ptosis   V - normal facial sensation                                                               VII - normal facial symmetry                                                             VIII - intact hearing                                                                             IX, X - symmetrical palate                                                                  XI - symmetrical shoulder shrug                                                       XII - tongue midline without atrophy or fasciculation      Motor function  Normal muscle bulk and tone; strength 5/5 on all 4 extremities, no pronator drift      Sensory function Intact to light touch, pinprick, vibration, proprioception on all 4 extremities      Cerebellar Intact fine motor movement. No involuntary movements or tremors. No ataxia or dysmetria on finger to nose or heel to shin testing      Reflex function DTR 2+ on bilateral UE and LE, symmetric.  Down going toes bilaterally      Gait                   normal base and arm swing                  Medical Decision Making: In summary, your patient, Anali Casey exhibits the following, with associated plan:    1. Dysesthesias of the right knee which is chronic since an injury in 2013.  She was previously diagnosed with complex regional pain syndrome which is improved with the use of a spinal cord stimulator. The patient was also recently seen by rheumatology and was diagnosed with fibromyalgia. She also recently had a positive VIRAL which upon further examination was found to likely be a false positive  1. Continue to follow with pain management  2. Continue to follow with rheumatology  3. Return in follow-up in 6 months  2. Depression  1. Continue Effexor 37.5 mg daily for treatment of depression. Because the patient is in the process of titrating on Belbuca with pain management, we will not change her Effexor at this time. She does note continued depression. At some point, we can try to titrate her to 75 mg daily. 3. Airway resistance syndrome recently diagnosed by baseline diagnostic sleep study  1. Patient is in the process of obtaining nasal CPAP  4. Midthoracic back pain, due to disc herniations and cord flattening of the thoracic cord. The patient does not display any signs of myelopathy. The patient displays no hyperactivity of her reflexes or any other upper motor neuron symptoms  1. Patient to continue to use lidocaine patch.  If she needs a new prescription, she will contact the office.  She was also advised that she could use salon pas patches  2. Continue Flexeril 10mg TID, but patient was advised to use it daily until she is able to get used to it so that it does not cause sedation.  Patient takes Flexeril very sparingly.  She was advised that if necessary, she can contact the office for a prescription in the future. 3. Continue belbuca per Dr. Aline Erickson  4. Continue to follow with Dr. Aline Erickson, pain management  5.  Continue to follow with Dr. Dedra Hernandez, orthopedic surgery, who reported that surgical intervention is not warranted until she begins to display more neurologic symptoms                Signed: Sharri Cortes CNP      *Please note that portions of this note were completed with a voice recognition program.  Although every effort was made to insure the accuracy of this automated transcription, some errors in transcription may have occurred, occasionally words and are mis-transcribed    Provider Attestation: The documentation recorded by the scribe accurately reflects the service I personally performed and the decisions made by myself. Portions of this note were transcribed by a scribe. I personally performed the history, physical exam, and medical decision-making and confirm the accuracy of the information in the transcribed note. Scribe Attestation:   By signing my name below, Maxine Ho, attest that this documentation has been prepared under the direction and in the presence of Sharri Cortes CNP.

## 2022-01-11 ENCOUNTER — HOSPITAL ENCOUNTER (OUTPATIENT)
Dept: PHYSICAL THERAPY | Facility: CLINIC | Age: 57
Setting detail: THERAPIES SERIES
Discharge: HOME | End: 2022-01-11
Payer: COMMERCIAL

## 2022-01-11 PROCEDURE — 97750 PHYSICAL PERFORMANCE TEST: CPT

## 2022-01-12 RX ORDER — CYCLOBENZAPRINE HCL 10 MG
TABLET ORAL
Qty: 30 TABLET | Refills: 3 | Status: SHIPPED | OUTPATIENT
Start: 2022-01-12 | End: 2022-06-21

## 2022-01-13 NOTE — H&P
Pre-op History and Physical  Nicole Fuller PA-C    Patient:  Pavithra Arce  MRN: [id number]  YOB: 1965    HISTORY OF PRESENT ILLNESS:     The patient is a 64 y.o. female who presents with asymptomatic microhematuria. Here for Cystocopy. Patient's old records, notes and chart reviewed and summarized above. Nicole Fuller PA-C independently reviewed the images and verified the radiology reports from:    No results found. Past Medical History:    Past Medical History:   Diagnosis Date    Arthritis     rheumatology   Dr. Tegan Barnhart Complex regional pain syndrome i of right lower limb 10/02/2019    pain mgmt   Dr. Roselyn Lake. Has pain stimulator.  Depression 06/27/2019    counseling    Fibromyalgia     Hyperlipidemia     pcp manages    Snores     Tachycardia     Dr. Anthony Petersen last seen 2021    Wellness examination     Ladrhea Menjivar last seen 1/2022       Past Surgical History:    Past Surgical History:   Procedure Laterality Date    KNEE ARTHROSCOPY      rt knee 3/2014    SPINAL CORD STIMULATOR SURGERY  03/2021    WISDOM TOOTH EXTRACTION         Medications Prior to Admission:    Prior to Admission medications    Medication Sig Start Date End Date Taking?  Authorizing Provider   BELBUCA 450 MCG FILM  12/31/21  Yes Historical Provider, MD   celecoxib (CELEBREX) 200 MG capsule TAKE 1 CAPSULE DAILY 12/29/21  Yes Zhane Farah DO   simvastatin (ZOCOR) 80 MG tablet TAKE 1 TABLET NIGHTLY  Patient taking differently: Take 40 mg by mouth 1/2 tab 12/29/21  Yes Zhane Farah DO   venlafaxine (EFFEXOR XR) 37.5 MG extended release capsule Take 1 capsule by mouth daily 10/4/21  Yes CONY Boyle CNP   doxyLAMINE succinate (GNP SLEEP AID) 25 MG tablet Take 25 mg by mouth   Yes Historical Provider, MD   cyclobenzaprine (FLEXERIL) 10 MG tablet TAKE ONE TABLET BY MOUTH THREE TIMES A DAY AS NEEDED FOR MUSCLE SPASMS 1/12/22   CONY Boyle CNP Allergies:  Trazodone and nefazodone and Nucynta [tapentadol]    Social History:    Social History     Socioeconomic History    Marital status:      Spouse name: Not on file    Number of children: Not on file    Years of education: Not on file    Highest education level: Not on file   Occupational History    Not on file   Tobacco Use    Smoking status: Current Every Day Smoker     Packs/day: 0.50     Years: 20.00     Pack years: 10.00     Types: Cigarettes    Smokeless tobacco: Never Used   Vaping Use    Vaping Use: Former    Substances: Occasionally   Substance and Sexual Activity    Alcohol use: Yes     Alcohol/week: 0.0 standard drinks     Comment: rarely    Drug use: No    Sexual activity: Not on file   Other Topics Concern    Not on file   Social History Narrative    Not on file     Social Determinants of Health     Financial Resource Strain: Low Risk     Difficulty of Paying Living Expenses: Not hard at all   Food Insecurity: No Food Insecurity    Worried About [address] [employer] in the Last Year: Never true    [address] in the Last Year: Never true   Transportation Needs:     Lack of Transportation (Medical): Not on file    Lack of Transportation (Non-Medical):  Not on file   Physical Activity:     Days of Exercise per Week: Not on file    Minutes of Exercise per Session: Not on file   Stress:     Feeling of Stress : Not on file   Social Connections:     Frequency of Communication with Friends and Family: Not on file    Frequency of Social Gatherings with Friends and Family: Not on file    Attends Advent Services: Not on file    Active Member of Clubs or Organizations: Not on file    Attends Club or Organization Meetings: Not on file    Marital Status: Not on file   Intimate Partner Violence:     Fear of Current or Ex-Partner: Not on file    Emotionally Abused: Not on file    Physically Abused: Not on file    Sexually Abused: Not on file   Housing Stability:     Unable to Pay for Housing in the Last Year: Not on file    Number of Places Lived in the Last Year: Not on file    Unstable Housing in the Last Year: Not on file       Family History:    Family History   Adopted: Yes   Family history unknown: Yes       REVIEW OF SYSTEMS:  Constitutional: negative  Eyes: negative  Respiratory: negative  Cardiovascular: negative  Gastrointestinal: negative  Genitourinary: no acute issues  Musculoskeletal: negative  Skin: negative   Neurological: negative  Hematological/Lymphatic: negative  Psychological: negative    PHYSICAL EXAM:    No data found. Constitutional: Patient in NAD  Neuro: Alert and oriented to person, place, and time  Psych: Mood and affect normal  Skin: Clean, dry, intact   Lungs: Respiratory effort normal, CTA  Cardiovascular:  Normal peripheral pulses; no murmur. Normal rhythm  Abdomen: Soft, non-tender, non-distended, no hepatosplenomegaly or hernia, CVA tenderness none  Bladder: Non-tender and non-disdended   : Non-tender, skin intact, no lesions       LABS:   No results for input(s): WBC, HGB, HCT, MCV, PLT in the last 72 hours. No results for input(s): NA, K, CL, CO2, PHOS, BUN, CREATININE, CA in the last 72 hours. No results found for: PSA      Urinalysis: No results for input(s): COLORU, PHUR, LABCAST, WBCUA, RBCUA, MUCUS, TRICHOMONAS, YEAST, BACTERIA, CLARITYU, SPECGRAV, LEUKOCYTESUR, UROBILINOGEN, July Cowper in the last 72 hours. Invalid input(s): NITRATE, GLUCOSEUKETONESUAMORPHOUS     -----------------------------------------------------------------    ASSESSMENT AND PLAN:    Impression:    Microhematuria (asymptomatic)  Patient Active Problem List   Diagnosis    Hyperlipidemia    Cervical pain (neck)    Hypercholesterolemia    Dysesthesia    Depression    Complex regional pain syndrome i of right lower limb    Thoracic spinal stenosis       Plan: Cystoscopy in OR today.     Consent obtained    The patient was counseled at length about the risks of sabrina Covid-19 during their perioperative period and any recovery window from their procedure. The patient was made aware that sabrina Covid-19  may worsen their prognosis for recovering from their procedure  and lend to a higher morbidity and/or mortality risk. All material risks, benefits, and reasonable alternatives including postponing the procedure were discussed. The patient does wish to proceed with the procedure at this time.         Kavitha Macias PA-C  1:52 PM 1/13/2022

## 2022-01-14 ENCOUNTER — HOSPITAL ENCOUNTER (OUTPATIENT)
Age: 57
Setting detail: OUTPATIENT SURGERY
Discharge: HOME | End: 2022-01-14
Attending: UROLOGY | Admitting: UROLOGY
Payer: COMMERCIAL

## 2022-01-14 VITALS
OXYGEN SATURATION: 97 % | WEIGHT: 125 LBS | TEMPERATURE: 97.7 F | HEIGHT: 62 IN | RESPIRATION RATE: 16 BRPM | HEART RATE: 88 BPM | DIASTOLIC BLOOD PRESSURE: 54 MMHG | SYSTOLIC BLOOD PRESSURE: 121 MMHG | BODY MASS INDEX: 23 KG/M2

## 2022-01-14 PROCEDURE — 3600000002 HC SURGERY LEVEL 2 BASE: Performed by: UROLOGY

## 2022-01-14 PROCEDURE — 2709999900 HC NON-CHARGEABLE SUPPLY: Performed by: UROLOGY

## 2022-01-14 PROCEDURE — 6370000000 HC RX 637 (ALT 250 FOR IP): Performed by: UROLOGY

## 2022-01-14 PROCEDURE — 7100000040 HC SPAR PHASE II RECOVERY - FIRST 15 MIN: Performed by: UROLOGY

## 2022-01-14 RX ORDER — LIDOCAINE HYDROCHLORIDE 20 MG/ML
JELLY TOPICAL PRN
Status: DISCONTINUED | OUTPATIENT
Start: 2022-01-14 | End: 2022-01-14 | Stop reason: ALTCHOICE

## 2022-01-14 RX ORDER — CIPROFLOXACIN 500 MG/1
500 TABLET, FILM COATED ORAL 2 TIMES DAILY
Qty: 6 TABLET | Refills: 0 | Status: SHIPPED | OUTPATIENT
Start: 2022-01-14 | End: 2022-01-17

## 2022-01-14 ASSESSMENT — PAIN - FUNCTIONAL ASSESSMENT: PAIN_FUNCTIONAL_ASSESSMENT: 0-10

## 2022-01-14 ASSESSMENT — PAIN DESCRIPTION - LOCATION: LOCATION: GENERALIZED

## 2022-01-14 ASSESSMENT — PAIN SCALES - GENERAL: PAINLEVEL_OUTOF10: 6

## 2022-01-14 ASSESSMENT — PAIN DESCRIPTION - DESCRIPTORS
DESCRIPTORS: ACHING;DISCOMFORT
DESCRIPTORS: ACHING;DISCOMFORT

## 2022-01-14 ASSESSMENT — PAIN DESCRIPTION - FREQUENCY: FREQUENCY: CONTINUOUS

## 2022-01-14 ASSESSMENT — PAIN DESCRIPTION - PAIN TYPE: TYPE: CHRONIC PAIN

## 2022-01-17 ENCOUNTER — TELEPHONE (OUTPATIENT)
Dept: FAMILY MEDICINE CLINIC | Age: 57
End: 2022-01-17

## 2022-01-17 NOTE — TELEPHONE ENCOUNTER
----- Message from Tristian Higuera sent at 1/13/2022  4:30 PM EST -----  Subject: Message to Provider    QUESTIONS  Information for Provider? Patient is returning a call to the office, she   doesn't know who called her just that someone said to [place] out. \"  ---------------------------------------------------------------------------  --------------  CALL BACK INFO  What is the best way for the office to contact you? OK to leave message on   voicemail  Preferred Call Back Phone Number?  [phone]  ---------------------------------------------------------------------------  --------------  SCRIPT ANSWERS  undefined

## 2022-01-19 ENCOUNTER — HOSPITAL ENCOUNTER (OUTPATIENT)
Dept: ULTRASOUND IMAGING | Age: 57
Discharge: HOME | End: 2022-01-21
Payer: COMMERCIAL

## 2022-01-19 DIAGNOSIS — R31.1 BENIGN ESSENTIAL MICROSCOPIC HEMATURIA: ICD-10-CM

## 2022-01-19 PROCEDURE — 76770 US EXAM ABDO BACK WALL COMP: CPT

## 2022-02-07 ENCOUNTER — HOSPITAL ENCOUNTER (OUTPATIENT)
Dept: MAMMOGRAPHY | Age: 57
Discharge: HOME | End: 2022-02-09
Payer: COMMERCIAL

## 2022-02-07 DIAGNOSIS — Z12.31 SCREENING MAMMOGRAM FOR BREAST CANCER: ICD-10-CM

## 2022-02-07 PROCEDURE — 77063 BREAST TOMOSYNTHESIS BI: CPT

## 2022-02-22 DIAGNOSIS — M48.04 THORACIC SPINAL STENOSIS: ICD-10-CM

## 2022-02-22 DIAGNOSIS — M54.2 CERVICAL PAIN (NECK): Primary | ICD-10-CM

## 2022-02-22 DIAGNOSIS — G90.521 COMPLEX REGIONAL PAIN SYNDROME I OF RIGHT LOWER LIMB: ICD-10-CM

## 2022-02-22 NOTE — TELEPHONE ENCOUNTER
Madina Cummings is calling to request a refill on the following medication(s):    Last Visit Date (If Applicable):  5/8/4322    Next Visit Date:    Visit date not found    Medication Request:  Requested Prescriptions     Pending Prescriptions Disp Refills    oxyCODONE-acetaminophen (PERCOCET) 5-325 MG per tablet

## 2022-02-23 DIAGNOSIS — G90.521 COMPLEX REGIONAL PAIN SYNDROME I OF RIGHT LOWER LIMB: Primary | ICD-10-CM

## 2022-02-23 RX ORDER — OXYCODONE HYDROCHLORIDE AND ACETAMINOPHEN 5; 325 MG/1; MG/1
1 TABLET ORAL EVERY 8 HOURS PRN
Qty: 60 TABLET | Refills: 0 | Status: SHIPPED | OUTPATIENT
Start: 2022-02-23 | End: 2022-03-25

## 2022-02-23 RX ORDER — OXYCODONE HYDROCHLORIDE AND ACETAMINOPHEN 5; 325 MG/1; MG/1
TABLET ORAL
Status: CANCELLED | OUTPATIENT
Start: 2022-02-23

## 2022-03-22 ENCOUNTER — OFFICE VISIT (OUTPATIENT)
Dept: DERMATOLOGY | Age: 57
End: 2022-03-22
Payer: COMMERCIAL

## 2022-03-22 VITALS
HEART RATE: 119 BPM | WEIGHT: 128.2 LBS | BODY MASS INDEX: 23.59 KG/M2 | DIASTOLIC BLOOD PRESSURE: 76 MMHG | OXYGEN SATURATION: 98 % | SYSTOLIC BLOOD PRESSURE: 132 MMHG | TEMPERATURE: 98 F | HEIGHT: 62 IN

## 2022-03-22 DIAGNOSIS — L72.9 CYST OF SKIN: ICD-10-CM

## 2022-03-22 DIAGNOSIS — D17.30 LIPOMA OF SKIN: Primary | ICD-10-CM

## 2022-03-22 PROCEDURE — G8427 DOCREV CUR MEDS BY ELIG CLIN: HCPCS | Performed by: DERMATOLOGY

## 2022-03-22 PROCEDURE — 3017F COLORECTAL CA SCREEN DOC REV: CPT | Performed by: DERMATOLOGY

## 2022-03-22 PROCEDURE — 99212 OFFICE O/P EST SF 10 MIN: CPT | Performed by: DERMATOLOGY

## 2022-03-22 PROCEDURE — G8482 FLU IMMUNIZE ORDER/ADMIN: HCPCS | Performed by: DERMATOLOGY

## 2022-03-22 PROCEDURE — 4004F PT TOBACCO SCREEN RCVD TLK: CPT | Performed by: DERMATOLOGY

## 2022-03-22 PROCEDURE — G8420 CALC BMI NORM PARAMETERS: HCPCS | Performed by: DERMATOLOGY

## 2022-03-22 NOTE — PROGRESS NOTES
Dermatology Patient Note  [facility]  [address]  [address] [facility] [address]  Dept: [phone]  Dept Fax: [phone]      VISITDATE: 3/22/2022   REFERRING PROVIDER: No ref. provider found      Jacobo Victor is a 64 y.o. female  who presents today in the office for:    Other (cyst on her rt side of the back of her neck. she states she had it removed and it returned. She also has a lipoma on the upper rt side of her back. )      HISTORY OF PRESENT ILLNESS:  As above. Patient states that cyst on neck has reappeared along the line where the previous cyst was removed. Patient had I&D of inflamed epidermoid cyst on neck on 6/8/2021 and excision of cyst on 8/24/2021. MEDICAL PROBLEMS:  Patient Active Problem List    Diagnosis Date Noted    Thoracic spinal stenosis 06/29/2020    Complex regional pain syndrome i of right lower limb 10/02/2019    Dysesthesia 06/27/2019    Depression 06/27/2019    Cervical pain (neck) 10/05/2012    Hypercholesterolemia 10/05/2012    Hyperlipidemia        CURRENT MEDICATIONS:   Current Outpatient Medications   Medication Sig Dispense Refill    oxyCODONE-acetaminophen (PERCOCET) 5-325 MG per tablet Take 1 tablet by mouth every 8 hours as needed for Pain for up to 30 days. 60 tablet 0    cyclobenzaprine (FLEXERIL) 10 MG tablet TAKE ONE TABLET BY MOUTH THREE TIMES A DAY AS NEEDED FOR MUSCLE SPASMS 30 tablet 3    BELBUCA 450 MCG FILM       celecoxib (CELEBREX) 200 MG capsule TAKE 1 CAPSULE DAILY 90 capsule 3    simvastatin (ZOCOR) 80 MG tablet TAKE 1 TABLET NIGHTLY (Patient taking differently: Take 40 mg by mouth 1/2 tab) 90 tablet 3    venlafaxine (EFFEXOR XR) 37.5 MG extended release capsule Take 1 capsule by mouth daily 90 capsule 2    doxyLAMINE succinate (GNP SLEEP AID) 25 MG tablet Take 25 mg by mouth       No current facility-administered medications for this visit.        ALLERGIES:   Allergies Allergen Reactions    Trazodone And Nefazodone Anaphylaxis    Nucynta [Tapentadol] Nausea And Vomiting       SOCIAL HISTORY:  Social History     Tobacco Use    Smoking status: Current Every Day Smoker     Packs/day: 0.50     Years: 20.00     Pack years: 10.00     Types: Cigarettes    Smokeless tobacco: Never Used   Substance Use Topics    Alcohol use: Yes     Alcohol/week: 0.0 standard drinks     Comment: rarely       Pertinent ROS:  Review of Systems  Skin: Denies any new changing, growing or bleeding lesions or rashes except as described in the HPI   Constitutional: Denies fevers, chills, and malaise. PHYSICAL EXAM:   /76 (Site: Left Upper Arm, Position: Sitting, Cuff Size: Medium Adult)   Pulse 119   Temp 98 °F (36.7 °C) (Temporal)   Ht 5' 2\" (1.575 m)   Wt 128 lb 3.2 oz (58.2 kg)   LMP 03/14/2013   SpO2 98%   BMI 23.45 kg/m²     The patient is generally well appearing, well nourished, alert and conversational. Affect is normal.    Cutaneous Exam:  Physical Exam  Waist-up skin: the head/face,neck, both arms, chest, back, abdomen, digits and/or nails, was examined. Facial covering was not removed during examination. Diagnoses/exam findings/medical history pertinent to this visit are listed below:    Assessment:   Diagnosis Orders   1. Lipoma of skin     2. Cyst of skin          Plan:  Lipoma of right upper back  - referral to Dr. Td Mckeon (surgery)    Milial cyst at top of excision line  Diagnostic milial cyst extraction: After verbal consent, 11 blade was used to puncture, then lateral pressure applied to cyst to extract contents.    Patient counseled to call if this recurs and will do a punch removal (no charge)      RTC prn    Future Appointments   Date Time Provider Nathaniel Jean Baptiste   3/24/2022  4:00 PM STV MRI RM 1 (1.5T) [facility] MRI [facility]iolog   4/11/2022  3:00 PM DO Ginger Quirosv OB/Gyn [facility]   7/7/2022 11:00 AM CONY Howard - CNP Neuro Spec [facility] There are no Patient Instructions on file for this visit. Tam Lewis, personally scribed the services dictated to me by Dr. Alex Chun in this documentation. I, Dr. Alex Chun, personally performed the services described in this documentation, as scribed by [facility] in my presence, and it is both accurate and complete.     Electronically signed by Sofia Carr MD on 3/22/2022 at 1:41 PM

## 2022-03-24 ENCOUNTER — HOSPITAL ENCOUNTER (OUTPATIENT)
Dept: MRI IMAGING | Age: 57
Discharge: HOME | End: 2022-03-26
Payer: COMMERCIAL

## 2022-03-24 DIAGNOSIS — M51.34 BULGING OF THORACIC INTERVERTEBRAL DISC: ICD-10-CM

## 2022-03-24 PROCEDURE — 72146 MRI CHEST SPINE W/O DYE: CPT

## 2022-04-11 ENCOUNTER — OFFICE VISIT (OUTPATIENT)
Dept: OBGYN CLINIC | Age: 57
End: 2022-04-11
Payer: COMMERCIAL

## 2022-04-11 ENCOUNTER — HOSPITAL ENCOUNTER (OUTPATIENT)
Age: 57
Setting detail: SPECIMEN
Discharge: HOME | End: 2022-04-11

## 2022-04-11 VITALS
DIASTOLIC BLOOD PRESSURE: 72 MMHG | WEIGHT: 126.5 LBS | SYSTOLIC BLOOD PRESSURE: 132 MMHG | HEART RATE: 131 BPM | HEIGHT: 62 IN | BODY MASS INDEX: 23.28 KG/M2

## 2022-04-11 DIAGNOSIS — Z01.419 WOMEN'S ANNUAL ROUTINE GYNECOLOGICAL EXAMINATION: Primary | ICD-10-CM

## 2022-04-11 PROCEDURE — 99386 PREV VISIT NEW AGE 40-64: CPT | Performed by: STUDENT IN AN ORGANIZED HEALTH CARE EDUCATION/TRAINING PROGRAM

## 2022-04-11 RX ORDER — OXYCODONE HYDROCHLORIDE AND ACETAMINOPHEN 5; 325 MG/1; MG/1
1 TABLET ORAL EVERY 4 HOURS PRN
COMMUNITY

## 2022-04-11 NOTE — PROGRESS NOTES
[facility] Obstetrics and Gynecology  [address], [place]  4/11/2022                       Primary Care Physician: Riley Maher DO    CC:   Chief Complaint   Patient presents with    New Patient         HPI: Jian Patel is a 64 y.o. female Manoj Lung Patient's last menstrual period was 03/14/2013. The patient was seen and examined. She is here for an annual visit. She is complaining of nothing. Patient is menopausal and denies any bleeding since that time. Her bowel habits are regular. She denies any bloating. She denies dysuria. She denies urinary leaking. She denies vaginal discharge. She is sexually active with single partner, contraception - post menopausal status.       Depression Screen: Symptoms of decreased mood absent  Symptoms of anhedonia absent  **If either question is answered in a  positive fashion then complete the PHQ9 Scoring Evaluation and make the appropriate referral**    REVIEW OF SYSTEMS:   Constitutional: negative fever, negative chills  HEENT: negative visual disturbances, negative headaches  Respiratory: negative dyspnea, negative cough  Cardiovascular: negative chest pain,  negative palpitations  Gastrointestinal: negative abdominal pain, negative RUQ pain, negative N/V, negative diarrhea, negative constipation  Genitourinary: negative dysuria, negative vaginal discharge  Dermatological: negative rash  Hematologic: negative bruising  Immunologic/Lymphatic: negative recent illness, negative recent sick contact  Musculoskeletal: negative back pain, negative myalgias, negative arthralgias  Neurological:  negative dizziness, negative weakness  Behavior/Psych: negative depression, negative anxiety      GYNECOLOGICAL HISTORY:  Age of Menarche: 15  Age of Menopause: 46     STD History: no past history    Permanent Sterilization: no  Reversible Birth Control: no  Hormone Replacement Exposure: no    OBSTETRICAL HISTORY:  OB History  Para Term  AB Living   2 2 2 0 0 2   SAB IAB Ectopic Molar Multiple Live Births   0 0 0 0 0 2      # Outcome Date GA Lbr Jeffrey/2nd Weight Sex Delivery Anes PTL Lv   2 Term      Vag-Spont      1 Term      Vag-Spont          PAST MEDICAL HISTORY:   has a past medical history of Arthritis, Complex regional pain syndrome i of right lower limb, Depression, Fibromyalgia, Hyperlipidemia, Snores, Tachycardia, and Wellness examination. PAST SURGICAL HISTORY:   has a past surgical history that includes [place] tooth extraction; Knee arthroscopy; Spinal Cord Stimulator Surgery (2021); and Cystoscopy (N/A, 2022). ALLERGIES:  is allergic to trazodone and nefazodone and nucynta [tapentadol]. MEDICATIONS:  Prior to Admission medications    Medication Sig Start Date End Date Taking? Authorizing Provider   oxyCODONE-acetaminophen (PERCOCET) 5-325 MG per tablet Take 1 tablet by mouth every 4 hours as needed for Pain. Yes Historical Provider, MD   cyclobenzaprine (FLEXERIL) 10 MG tablet TAKE ONE TABLET BY MOUTH THREE TIMES A DAY AS NEEDED FOR MUSCLE SPASMS 22  Yes CONY Gould CNP   BELBUCA 450 MCG FILM  21  Yes Historical Provider, MD   celecoxib (CELEBREX) 200 MG capsule TAKE 1 CAPSULE DAILY 21  Yes Zhane Farah DO   simvastatin (ZOCOR) 80 MG tablet TAKE 1 TABLET NIGHTLY  Patient taking differently: Take 40 mg by mouth 1/2 tab 21  Yes Zhane Farah DO   venlafaxine (EFFEXOR XR) 37.5 MG extended release capsule Take 1 capsule by mouth daily 10/4/21  Yes CONY Gould CNP   doxyLAMINE succinate (GNP SLEEP AID) 25 MG tablet Take 25 mg by mouth   Yes Historical Provider, MD       FAMILY HISTORY:  Family History of Breast, Ovarian, Colon or Uterine Cancer: No   She was adopted. Family history is unknown by patient. SOCIAL HISTORY:   reports that she has been smoking cigarettes. She has a 10.00 pack-year smoking history.  She has never used smokeless tobacco. She reports current alcohol use. She reports that she does not use drugs. HEALTH MAINTENANCE:  Immunization status: up to date and documented    [facility]: BIRADS 2 . Next due . Colonoscopy: Cologuard  Neg. Due . Pap Smears: No records. Updated today. Family Planning: Menopausal  DEXA: N/A    VITALS:  Vitals:    22 1510   BP: 132/72   Site: Right Upper Arm   Position: Sitting   Cuff Size: Medium Adult   Pulse: 131   Weight: 126 lb 8 oz (57.4 kg)   Height: 5' 2\" (1.575 m)                                                                                                                                                                                                        PHYSICAL EXAM:   General Appearance: Appears healthy. Alert; in no acute distress. Pleasant. Skin: Skin color, texture, turgor normal. No rashes or lesions. HEENT: normocephalic and atraumatic, Thyroid normal to inspection and palpation  Respiratory: Normal expansion. Clear to auscultation. No rales, rhonchi, or wheezing. Cardiovascular: normal rate, normal S1 and S2, no gallops, intact distal pulses and no carotid bruits  Breast:  (Chest): normal appearance, no masses or tenderness  Abdomen: soft, non-tender, non-distended, no right upper quadrant tenderness and no CVA tenderness  Pelvic Exam:   External genitalia: General appearance; normal, Hair distribution; normal, Lesions absent  Urinary system: urethral meatus normal  Vaginal: normal mucosa, no discharge  Cervix: normal appearing cervix without discharge or lesions  Adnexa: non-palpable  Uterus: normal single, nontender  Rectal Exam: exam declined by patient  Musculoskeletal: no gross abnormalities  Extremities: non-tender BLE and non-edematous  Psych:  oriented to time, place and person     DATA:  No results found for this visit on 22.     ASSESSMENT & PLAN:    Alejandra Durand is a 64 y.o. female  Patient's last menstrual period was 03/14/2013. Annual:   Mammogram: BIRADS 2 2/22. Next due 2/23. Colonoscopy: Cologuard 2/22 Neg. Due 2/25. Pap Smears: No records. Updated today. Family Planning: Menopausal   DEXA: N/A    Patient Active Problem List    Diagnosis Date Noted    Thoracic spinal stenosis 06/29/2020    Complex regional pain syndrome i of right lower limb 10/02/2019    Dysesthesia 06/27/2019    Depression 06/27/2019    Cervical pain (neck) 10/05/2012    Hypercholesterolemia 10/05/2012    Hyperlipidemia        Return in about 1 year (around 4/11/2023) for Annual.  No Patient Care Coordination Note on file. Counseling Completed:    Discussed need for repeat pap as per American Society for Colposcopy and Cervical Pathology guidelines. Discussed need for mammograms every 1 year, If >44 yo and last mammogram was negative. Discussed Calcium and Vitamin D dosing. Discussed need for colonoscopy screening as well as onset for bone density testing. Discussed birth control and barrier recommendations. Discussed STD counseling and prevention. Discussed Gardisil counseling for all patients 10-35 yo. Hereditary Breast, Ovarian, Colon and Uterine Cancer screening discussed. Tobacco & Secondary smoke risks discussed; with recommendation for cessation and avoidance. Routine health maintenance per patients PCP discussed. Diagnosis Orders   1.  Women's annual routine gynecological examination  PAP Smear        DO Tamica Spence OB/GYN, [facility]  4/11/2022, 3:31 PM

## 2022-04-12 LAB
HPV SAMPLE: NORMAL
HPV, GENOTYPE 16: NOT DETECTED
HPV, GENOTYPE 18: NOT DETECTED
HPV, HIGH RISK OTHER: NOT DETECTED
HPV, INTERPRETATION: NORMAL
SPECIMEN DESCRIPTION: NORMAL

## 2022-04-19 LAB — CYTOLOGY REPORT: NORMAL

## 2022-04-29 NOTE — PROGRESS NOTES
Preoperative Instructions:    Stop eating solid foods at midnight the night prior to your surgery. Stop drinking clear liquids at midnight the night prior to your surgery. DO NOT SMOKE the morning of your surgery. Arrive at the surgery center (3rd entrance) on ____[id number]___________ by ___1030 am____________. Please stop any blood thinning medications as directed by your surgeon or prescribing physician. Failure to stop certain medications may interfere with your scheduled surgery. These may include: Aspirin, Coumadin, Plavix, NSAIDS (Motrin, Aleve, Advil, Mobic, Celebrex), Eliquis, Pradaxa, Xarelto, Fish oil, and herbal supplements. You may continue the rest of your medications through the night before surgery unless instructed otherwise. Day of surgery please take only the following medication(s) with a small sip of water:None      Please shower with antibacterial soap or Hibiclens soap the night before and the morning of surgery. Reminders:  -If you are going home the day of your procedure, you will need a family member or friend to stay during the procedure and drive you home after your procedure. Your  must be 25years of age or older and able to sign off on your discharge instructions.    -If you are going home the same day of your surgery, someone must remain with you for the first 24 hours after your surgery if you receive sedation or anesthesia.      -Please do not wear any jewelery, lotions, contacts  or body piercing the day of surgery

## 2022-05-02 ENCOUNTER — HOSPITAL ENCOUNTER (OUTPATIENT)
Age: 57
Discharge: HOME | End: 2022-05-02
Payer: COMMERCIAL

## 2022-05-02 DIAGNOSIS — Z01.811 PRE-OP CHEST EXAM: Primary | ICD-10-CM

## 2022-05-02 PROCEDURE — 93005 ELECTROCARDIOGRAM TRACING: CPT | Performed by: SURGERY

## 2022-05-03 ENCOUNTER — ANESTHESIA EVENT (OUTPATIENT)
Dept: OPERATING ROOM | Age: 57
End: 2022-05-03
Payer: COMMERCIAL

## 2022-05-03 LAB
EKG ATRIAL RATE: 119 BPM
EKG P AXIS: 73 DEGREES
EKG P-R INTERVAL: 128 MS
EKG Q-T INTERVAL: 334 MS
EKG QRS DURATION: 84 MS
EKG QTC CALCULATION (BAZETT): 469 MS
EKG R AXIS: 93 DEGREES
EKG T AXIS: 69 DEGREES
EKG VENTRICULAR RATE: 119 BPM

## 2022-05-09 RX ORDER — SODIUM CHLORIDE 0.9 % (FLUSH) 0.9 %
5-40 SYRINGE (ML) INJECTION EVERY 12 HOURS SCHEDULED
Status: CANCELLED | OUTPATIENT
Start: 2022-05-09

## 2022-05-09 RX ORDER — SODIUM CHLORIDE 0.9 % (FLUSH) 0.9 %
5-40 SYRINGE (ML) INJECTION PRN
Status: CANCELLED | OUTPATIENT
Start: 2022-05-09

## 2022-05-09 RX ORDER — LIDOCAINE HYDROCHLORIDE 10 MG/ML
1 INJECTION, SOLUTION EPIDURAL; INFILTRATION; INTRACAUDAL; PERINEURAL
Status: CANCELLED | OUTPATIENT
Start: 2022-05-09 | End: 2022-05-09

## 2022-05-09 RX ORDER — SODIUM CHLORIDE 9 MG/ML
INJECTION, SOLUTION INTRAVENOUS PRN
Status: CANCELLED | OUTPATIENT
Start: 2022-05-09

## 2022-05-09 RX ORDER — SODIUM CHLORIDE, SODIUM LACTATE, POTASSIUM CHLORIDE, CALCIUM CHLORIDE 600; 310; 30; 20 MG/100ML; MG/100ML; MG/100ML; MG/100ML
INJECTION, SOLUTION INTRAVENOUS CONTINUOUS
Status: CANCELLED | OUTPATIENT
Start: 2022-05-09

## 2022-05-09 NOTE — OP NOTE
Operative Note      Patient: Nunu Dang  YOB: 1965  MRN: [id number]   Savi Goodmna DO      Date of Procedure: 5/10/2022    Pre-Op Diagnosis: posterior neck lipoma causing pain    Post-Op Diagnosis: Same       Procedure(s):  Sharp excisional biopsy posterior neck lipoma down to fascia, 5 cm    Surgeon(s):  Terrie Morales DO    Anesthesia: General/MAC    Estimated Blood Loss (mL): 2ml    Complications: None    Specimens:   ID Type Source Tests Collected by Time Destination   A : POSTERIOR NECK LIPOMA Tissue Tissue SURGICAL PATHOLOGY Terrie Morales DO 5/10/2022 1224      Details:    Please see H&P. Pre-op IV antibiotics given. Site inspected/marked in pre-op. Patient brought to OR suite, placed under anesthesia, positioned appropriately, surgical pause performed, site prepped and draped in sterile fashion. Local anesthetic infiltrated at site(s). 15 blade used to sharply excise posterior neck lipoma in a transverse fashion. Bovie used for light cautery. Lipoma is removed down to level of fascia. 5 cm. Wound irrigated. Told patient and  there will be a seroma formed from the dead space, this will re-absorb over time. The wound cavity is too small for a drain placement. The skin edges are approximated with 4-0 nylon vertical mattress sutures, pressure dressing. All counts correct. Tolerated well.      Electronically signed by Terrie Morales DO, FACOS, FACS on 5/13/2022 at 7:27 PM

## 2022-05-09 NOTE — H&P
GENERAL SURGERY H&P        Patient's Name/ Date of Birth/ Gender: Jackson Tabares / 1965 (64 y.o.) / female      PCP: Brandy Lugo DO  Referring: Thalia Rivera MD     History of present Illness:  Patient is a pleasant 64 y.o. female  kindly referred by Dr. Melodie Fleischer [facility]at[facility] for symptomatic posterior neck large lipoma. Examined. Known to Dermatology because of inclusion cyst of neck that was addressed in the office. She is a smoker. She has chronic pain in the C-spine, sees multiple specialists. She has a spinal stimulator. She is going to be evaluated for getting a second one with her chronic pain at a different spinal level. She is very pleasant and cooperative. Thin. BMI 22     Past Medical History:  has a past medical history of Arthritis, Chronic pain, Complex regional pain syndrome i of right lower limb, CRPS (complex regional pain syndrome type I), Depression, Fibromyalgia, Hyperlipidemia, Snores, Tachycardia, and Wellness examination.     Past Surgical History:   Past Surgical History         Past Surgical History:   Procedure Laterality Date    CYSTOSCOPY N/A 1/14/2022     CYSTOSCOPY performed by Archana Hickman MD at [address] [place] [address] ARTHROSCOPY         rt knee 3/2014    SPINAL CORD STIMULATOR SURGERY   03/2021    WISDOM TOOTH EXTRACTION                Social History:  reports that she has been smoking cigarettes. She has a 10.00 pack-year smoking history. She has never used smokeless tobacco. She reports current alcohol use. She reports that she does not use drugs.     Family History: She was adopted.  Family history is unknown by patient.     Review of Systems:   General: Completed and, except as mentioned above, was negative or noncontributory  Psychological:  Completed and, except as mentioned above, was negative or noncontributory  Ophthalmic:  Completed and, except as mentioned above, was negative or noncontributory  ENT:  Completed and, except as mentioned above, was negative or Exam:  Vital signs and Nurse's note reviewed. Pulse 94   Resp 16   Ht 5' 2\" (1.575 m)   Wt 128 lb (58.1 kg)   LMP 2013   SpO2 98%   Breastfeeding No   BMI 23.41 kg/m²    height is 5' 2\" (1.575 m) and weight is 128 lb (58.1 kg). Her pulse is 94. Her respiration is 16 and oxygen saturation is 98%. Gen:  A&Ox3, NAD. Pleasant and cooperative. HEENT: PERRLA, EOMI, no scleral icterus  Neck:  no goiter. Posterior neck broad based, deeper lipoma clinically. CVS: Regular rate and rhythm  Resp: Good bilateral air entry, no active wheezing, no labored breathing  Abd: soft, non-tender, non-distended  Ext: Moves all extremities, no gross focal motor deficits  Skin: No erythema or ulcerations      Labs:         Lab Results   Component Value Date     WBC 9.86 2021     WBC 10.2 2020     HGB 14.2 2021     HCT 44.2 2021     MCV 91.5 2021      2021            Lab Results   Component Value Date      2021     K 4.1 2021      2021     CO2 30 2021     BUN 18 2021     CREATININE 0.71 2021     GLUCOSE 77 2021     CALCIUM 9.8 2021            Lab Results   Component Value Date     ALKPHOS 54 2013     ALT 16 2013     AST 19 2013     BILITOT 0.8 2013     LABALBU 3.8 2013      No results found for: AMYLASE  No results found for: LIPASE        Lab Results   Component Value Date     INR 0.97 2021         Radiologic Studies:  [facility]   Department of Radiology   [address] [facility]   [phone]               ==========================================================================   Patient Name: Kentrell Jones    : 1965   Sex: F   Age:    Horacio Price   MRN: [id number]   Pt.  Location:    Patient Status: O   Visit #: [id number]   Ordered Date: 3/15/2021 7:30:00 AM   Completed Date: 03/15/2021 08:16 AM   Requesting Provider: Tatum Pak    Attending Provider: Tatum Pak    Report Copy To: Signs ^ Symptoms: SPINAL CORD STIMULATOR PLACEMENT   History:    Comments: SPINAL CORD STIMULATOR PLACEMENT   Exam: LUMBAR SPINE 2 OR 3 S   Accession #: [id number]   ==========================================================================   LUMBAR SPINE 2 OR 3 [facility]  3/15/2021 8:16 AM       CLINICAL INDICATIONS: SPINAL CORD STIMULATOR PLACEMENT       TECHNOLOGIST COMMENTS: 1.16 mins of fluoro used by Dr Genny Lynn: [address] [place] [address]       PROTOCOL: AP, Lateral and L5-S1 spot film was obtained.       COMPARISON: None.       FINDINGS:       76.3 seconds fluoroscopy time was provided. 2. Images were obtained demonstrating the lead placement of the    spinal stimulator.        IMPRESSION:        As above.       Electronically signed: Soraya Win.               Transcribed by: [email or web address], User    Resident: Tatum Pak   Electronically Signed by: Tatum Pak @ 03/15/2021 08:36 AM   I personally read this/these film(s) with this resident         Impressions/Recommendations:      Posterior neck deep broad based lipoma. Chronic spinal cord pain. Discussed in detail for planned excision of lipoma to try to help with her symptoms as it causes pressure on the musculature. Questions answered. Plan excision under deep MAC/general. Will discuss positioning with anesthesia and surgical crew day of surgery after marking site. Ok to stay on Celebrex/anti-inflammatory medications for pain if needed.         BENJAMIN Graham, MPH, [address] [place] [address] Surgery, [address] [place] [address] office [phone]  [place] office [phone]

## 2022-05-10 ENCOUNTER — HOSPITAL ENCOUNTER (OUTPATIENT)
Age: 57
Setting detail: OUTPATIENT SURGERY
Discharge: HOME | End: 2022-05-10
Attending: SURGERY | Admitting: SURGERY
Payer: COMMERCIAL

## 2022-05-10 ENCOUNTER — ANESTHESIA (OUTPATIENT)
Dept: OPERATING ROOM | Age: 57
End: 2022-05-10
Payer: COMMERCIAL

## 2022-05-10 VITALS
WEIGHT: 126 LBS | BODY MASS INDEX: 22.32 KG/M2 | RESPIRATION RATE: 26 BRPM | HEART RATE: 96 BPM | OXYGEN SATURATION: 98 % | SYSTOLIC BLOOD PRESSURE: 137 MMHG | HEIGHT: 63 IN | TEMPERATURE: 97.4 F | DIASTOLIC BLOOD PRESSURE: 78 MMHG

## 2022-05-10 VITALS
RESPIRATION RATE: 14 BRPM | TEMPERATURE: 96.8 F | SYSTOLIC BLOOD PRESSURE: 98 MMHG | OXYGEN SATURATION: 100 % | DIASTOLIC BLOOD PRESSURE: 53 MMHG

## 2022-05-10 DIAGNOSIS — G89.18 ACUTE POSTOPERATIVE PAIN: ICD-10-CM

## 2022-05-10 DIAGNOSIS — Z01.818 PRE-OP TESTING: Primary | ICD-10-CM

## 2022-05-10 PROBLEM — D17.0 LIPOMA OF NECK: Status: ACTIVE | Noted: 2022-05-10

## 2022-05-10 PROCEDURE — 88304 TISSUE EXAM BY PATHOLOGIST: CPT

## 2022-05-10 PROCEDURE — 2500000003 HC RX 250 WO HCPCS: Performed by: SURGERY

## 2022-05-10 PROCEDURE — 3700000000 HC ANESTHESIA ATTENDED CARE: Performed by: SURGERY

## 2022-05-10 PROCEDURE — 7100000001 HC PACU RECOVERY - ADDTL 15 MIN: Performed by: SURGERY

## 2022-05-10 PROCEDURE — 3600000012 HC SURGERY LEVEL 2 ADDTL 15MIN: Performed by: SURGERY

## 2022-05-10 PROCEDURE — 6360000002 HC RX W HCPCS: Performed by: NURSE ANESTHETIST, CERTIFIED REGISTERED

## 2022-05-10 PROCEDURE — 3600000002 HC SURGERY LEVEL 2 BASE: Performed by: SURGERY

## 2022-05-10 PROCEDURE — 7100000010 HC PHASE II RECOVERY - FIRST 15 MIN: Performed by: SURGERY

## 2022-05-10 PROCEDURE — 7100000011 HC PHASE II RECOVERY - ADDTL 15 MIN: Performed by: SURGERY

## 2022-05-10 PROCEDURE — 7100000000 HC PACU RECOVERY - FIRST 15 MIN: Performed by: SURGERY

## 2022-05-10 PROCEDURE — 2709999900 HC NON-CHARGEABLE SUPPLY: Performed by: SURGERY

## 2022-05-10 PROCEDURE — 2580000003 HC RX 258: Performed by: NURSE ANESTHETIST, CERTIFIED REGISTERED

## 2022-05-10 PROCEDURE — 2500000003 HC RX 250 WO HCPCS: Performed by: NURSE ANESTHETIST, CERTIFIED REGISTERED

## 2022-05-10 PROCEDURE — 3700000001 HC ADD 15 MINUTES (ANESTHESIA): Performed by: SURGERY

## 2022-05-10 RX ORDER — SODIUM CHLORIDE 0.9 % (FLUSH) 0.9 %
5-40 SYRINGE (ML) INJECTION PRN
Status: DISCONTINUED | OUTPATIENT
Start: 2022-05-10 | End: 2022-05-10 | Stop reason: HOSPADM

## 2022-05-10 RX ORDER — BUPIVACAINE HYDROCHLORIDE AND EPINEPHRINE 2.5; 5 MG/ML; UG/ML
INJECTION, SOLUTION INFILTRATION; PERINEURAL PRN
Status: DISCONTINUED | OUTPATIENT
Start: 2022-05-10 | End: 2022-05-10 | Stop reason: ALTCHOICE

## 2022-05-10 RX ORDER — OXYCODONE HYDROCHLORIDE AND ACETAMINOPHEN 5; 325 MG/1; MG/1
1 TABLET ORAL
Qty: 15 TABLET | Refills: 0 | Status: SHIPPED | OUTPATIENT
Start: 2022-05-10 | End: 2022-05-17

## 2022-05-10 RX ORDER — SODIUM CHLORIDE 0.9 % (FLUSH) 0.9 %
5-40 SYRINGE (ML) INJECTION EVERY 12 HOURS SCHEDULED
Status: DISCONTINUED | OUTPATIENT
Start: 2022-05-10 | End: 2022-05-10 | Stop reason: HOSPADM

## 2022-05-10 RX ORDER — DIPHENHYDRAMINE HYDROCHLORIDE 50 MG/ML
12.5 INJECTION INTRAMUSCULAR; INTRAVENOUS
Status: DISCONTINUED | OUTPATIENT
Start: 2022-05-10 | End: 2022-05-10 | Stop reason: HOSPADM

## 2022-05-10 RX ORDER — DEXAMETHASONE SODIUM PHOSPHATE 10 MG/ML
INJECTION, SOLUTION INTRAMUSCULAR; INTRAVENOUS PRN
Status: DISCONTINUED | OUTPATIENT
Start: 2022-05-10 | End: 2022-05-10 | Stop reason: SDUPTHER

## 2022-05-10 RX ORDER — ONDANSETRON 2 MG/ML
INJECTION INTRAMUSCULAR; INTRAVENOUS PRN
Status: DISCONTINUED | OUTPATIENT
Start: 2022-05-10 | End: 2022-05-10 | Stop reason: SDUPTHER

## 2022-05-10 RX ORDER — MIDAZOLAM HYDROCHLORIDE 1 MG/ML
INJECTION INTRAMUSCULAR; INTRAVENOUS PRN
Status: DISCONTINUED | OUTPATIENT
Start: 2022-05-10 | End: 2022-05-10 | Stop reason: SDUPTHER

## 2022-05-10 RX ORDER — SODIUM CHLORIDE, SODIUM LACTATE, POTASSIUM CHLORIDE, CALCIUM CHLORIDE 600; 310; 30; 20 MG/100ML; MG/100ML; MG/100ML; MG/100ML
INJECTION, SOLUTION INTRAVENOUS CONTINUOUS PRN
Status: DISCONTINUED | OUTPATIENT
Start: 2022-05-10 | End: 2022-05-10 | Stop reason: SDUPTHER

## 2022-05-10 RX ORDER — ONDANSETRON 2 MG/ML
4 INJECTION INTRAMUSCULAR; INTRAVENOUS
Status: DISCONTINUED | OUTPATIENT
Start: 2022-05-10 | End: 2022-05-10 | Stop reason: HOSPADM

## 2022-05-10 RX ORDER — LIDOCAINE HYDROCHLORIDE 20 MG/ML
INJECTION, SOLUTION INFILTRATION; PERINEURAL PRN
Status: DISCONTINUED | OUTPATIENT
Start: 2022-05-10 | End: 2022-05-10 | Stop reason: SDUPTHER

## 2022-05-10 RX ORDER — SODIUM CHLORIDE 9 MG/ML
25 INJECTION, SOLUTION INTRAVENOUS PRN
Status: DISCONTINUED | OUTPATIENT
Start: 2022-05-10 | End: 2022-05-10 | Stop reason: HOSPADM

## 2022-05-10 RX ORDER — CEFAZOLIN SODIUM 2 G/50ML
SOLUTION INTRAVENOUS PRN
Status: DISCONTINUED | OUTPATIENT
Start: 2022-05-10 | End: 2022-05-10 | Stop reason: SDUPTHER

## 2022-05-10 RX ORDER — BUPIVACAINE HYDROCHLORIDE AND EPINEPHRINE 2.5; 5 MG/ML; UG/ML
INJECTION, SOLUTION INFILTRATION; PERINEURAL
Status: DISCONTINUED
Start: 2022-05-10 | End: 2022-05-10 | Stop reason: HOSPADM

## 2022-05-10 RX ORDER — MEPERIDINE HYDROCHLORIDE 50 MG/ML
12.5 INJECTION INTRAMUSCULAR; INTRAVENOUS; SUBCUTANEOUS ONCE
Status: DISCONTINUED | OUTPATIENT
Start: 2022-05-10 | End: 2022-05-10 | Stop reason: HOSPADM

## 2022-05-10 RX ORDER — MORPHINE SULFATE 1 MG/ML
1 INJECTION, SOLUTION EPIDURAL; INTRATHECAL; INTRAVENOUS EVERY 5 MIN PRN
Status: DISCONTINUED | OUTPATIENT
Start: 2022-05-10 | End: 2022-05-10 | Stop reason: HOSPADM

## 2022-05-10 RX ORDER — PROPOFOL 10 MG/ML
INJECTION, EMULSION INTRAVENOUS PRN
Status: DISCONTINUED | OUTPATIENT
Start: 2022-05-10 | End: 2022-05-10 | Stop reason: SDUPTHER

## 2022-05-10 RX ORDER — FENTANYL CITRATE 50 UG/ML
INJECTION, SOLUTION INTRAMUSCULAR; INTRAVENOUS PRN
Status: DISCONTINUED | OUTPATIENT
Start: 2022-05-10 | End: 2022-05-10 | Stop reason: SDUPTHER

## 2022-05-10 RX ADMIN — CEFAZOLIN SODIUM 2000 MG: 2 SOLUTION INTRAVENOUS at 11:56

## 2022-05-10 RX ADMIN — DEXAMETHASONE SODIUM PHOSPHATE 4 MG: 10 INJECTION INTRAMUSCULAR; INTRAVENOUS at 12:17

## 2022-05-10 RX ADMIN — ONDANSETRON 4 MG: 2 INJECTION INTRAMUSCULAR; INTRAVENOUS at 12:18

## 2022-05-10 RX ADMIN — SODIUM CHLORIDE, POTASSIUM CHLORIDE, SODIUM LACTATE AND CALCIUM CHLORIDE: 600; 310; 30; 20 INJECTION, SOLUTION INTRAVENOUS at 11:56

## 2022-05-10 RX ADMIN — FENTANYL CITRATE 50 MCG: 50 INJECTION, SOLUTION INTRAMUSCULAR; INTRAVENOUS at 11:59

## 2022-05-10 RX ADMIN — FENTANYL CITRATE 50 MCG: 50 INJECTION, SOLUTION INTRAMUSCULAR; INTRAVENOUS at 12:36

## 2022-05-10 RX ADMIN — LIDOCAINE HYDROCHLORIDE 50 MG: 20 INJECTION, SOLUTION INFILTRATION; PERINEURAL at 12:05

## 2022-05-10 RX ADMIN — MIDAZOLAM HYDROCHLORIDE 2 MG: 1 INJECTION, SOLUTION INTRAMUSCULAR; INTRAVENOUS at 11:57

## 2022-05-10 RX ADMIN — PROPOFOL 40 MG: 10 INJECTION, EMULSION INTRAVENOUS at 12:05

## 2022-05-10 ASSESSMENT — PULMONARY FUNCTION TESTS
PIF_VALUE: 0
PIF_VALUE: 1
PIF_VALUE: 1
PIF_VALUE: 0
PIF_VALUE: 0
PIF_VALUE: 1
PIF_VALUE: 0
PIF_VALUE: 1
PIF_VALUE: 1
PIF_VALUE: 0
PIF_VALUE: 1
PIF_VALUE: 0
PIF_VALUE: 1
PIF_VALUE: 0
PIF_VALUE: 1
PIF_VALUE: 0
PIF_VALUE: 1

## 2022-05-10 ASSESSMENT — LIFESTYLE VARIABLES: SMOKING_STATUS: 1

## 2022-05-10 NOTE — ANESTHESIA PRE PROCEDURE
Department of Anesthesiology  Preprocedure Note       Name:  Nunu Dang   Age:  64 y.o.  :  1965                                          MRN:  [id number]         Date:  5/10/2022      Surgeon: Glenda Cedeno):  Terrie Morales DO    Procedure: Procedure(s):  POSTERIOR NECK LIPOMA BIOPSY EXCISION    Medications prior to admission:   Prior to Admission medications    Medication Sig Start Date End Date Taking? Authorizing Provider   oxyCODONE-acetaminophen (PERCOCET) 5-325 MG per tablet Take 1 tablet by mouth every 4 hours as needed for Pain. Historical Provider, MD   cyclobenzaprine (FLEXERIL) 10 MG tablet TAKE ONE TABLET BY MOUTH THREE TIMES A DAY AS NEEDED FOR MUSCLE SPASMS 22   CONY Wilson CNP   BELBUCA 450 MCG FILM  21   Historical Provider, MD   celecoxib (CELEBREX) 200 MG capsule TAKE 1 CAPSULE DAILY 21   Zhane Farah DO   simvastatin (ZOCOR) 80 MG tablet TAKE 1 TABLET NIGHTLY  Patient taking differently: Take 40 mg by mouth 1/2 tab 21   Zhane Farah DO   venlafaxine (EFFEXOR XR) 37.5 MG extended release capsule Take 1 capsule by mouth daily 10/4/21   CONY Wilson CNP   doxyLAMINE succinate (GNP SLEEP AID) 25 MG tablet Take 25 mg by mouth    Historical Provider, MD       Current medications:    Current Facility-Administered Medications   Medication Dose Route Frequency Provider Last Rate Last Admin    ceFAZolin (ANCEF) IVPB                Allergies: Allergies   Allergen Reactions    Trazodone And Nefazodone Anaphylaxis    Nucynta [Tapentadol] Nausea And Vomiting       Problem List:    Patient Active Problem List   Diagnosis Code    Hyperlipidemia E78.5    Cervical pain (neck) M54.2    Hypercholesterolemia E78.00    Dysesthesia R20.8    Depression F32. A    Complex regional pain syndrome i of right lower limb G90.521    Thoracic spinal stenosis M48.04       Past Medical History:        Diagnosis Date    Arthritis     [facility]ology    [facility]    Chronic pain     Complex regional pain syndrome i of right lower limb 10/02/2019    pain mgmt   Dr. Tracy David. Has pain stimulator.  CRPS (complex regional pain syndrome type I)     Depression 06/27/2019    counseling    Fibromyalgia     Hyperlipidemia     pcp manages    Snores     airway resistance- getting a cpap    Tachycardia     Dr. Jasbir Carter last seen 2021    Wellness examination     Marlis Brunner last seen 1/2022       Past Surgical History:        Procedure Laterality Date    CYSTOSCOPY N/A 1/14/2022    CYSTOSCOPY performed by Dana Childers MD at [address] [place] [address] Way ARTHROSCOPY      rt knee 3/2014    SPINAL CORD STIMULATOR SURGERY  03/2021    WISDOM TOOTH EXTRACTION         Social History:    Social History     Tobacco Use    Smoking status: Current Every Day Smoker     Packs/day: 0.50     Years: 20.00     Pack years: 10.00     Types: Cigarettes    Smokeless tobacco: Never Used   Substance Use Topics    Alcohol use: Yes     Alcohol/week: 0.0 standard drinks     Comment: rarely                                Ready to quit: Not Answered  Counseling given: Not Answered      Vital Signs (Current):   Vitals:    04/29/22 0921 05/10/22 1041   BP:  118/82   Pulse:  112   Resp:  19   Temp:  98 °F (36.7 °C)   SpO2:  98%   Weight: 125 lb (56.7 kg) 126 lb (57.2 kg)   Height:  5' 3\" (1.6 m)                                              BP Readings from Last 3 Encounters:   05/10/22 118/82   04/11/22 132/72   03/22/22 132/76       NPO Status: Time of last liquid consumption: 2200                        Time of last solid consumption: 2100                        Date of last liquid consumption: 05/09/22                        Date of last solid food consumption: 05/09/22    BMI:   Wt Readings from Last 3 Encounters:   05/10/22 126 lb (57.2 kg)   04/19/22 128 lb (58.1 kg)   04/11/22 126 lb 8 oz (57.4 kg)     Body mass index is 22.32 kg/m².     CBC:   Lab Results Component Value Date    WBC 9.86 03/02/2021    WBC 10.2 02/25/2020    RBC 4.83 03/02/2021    HGB 14.2 03/02/2021    HCT 44.2 03/02/2021    MCV 91.5 03/02/2021    RDW 13.3 03/02/2021     03/02/2021       CMP:   Lab Results   Component Value Date     03/02/2021    K 4.1 03/02/2021     03/02/2021    CO2 30 03/02/2021    BUN 18 03/02/2021    CREATININE 0.71 03/02/2021    GLUCOSE 77 03/02/2021    CALCIUM 9.8 03/02/2021    BILITOT 0.8 06/01/2013    ALKPHOS 54 06/01/2013    AST 19 06/01/2013    ALT 16 06/01/2013       POC Tests: No results for input(s): POCGLU, POCNA, POCK, POCCL, POCBUN, POCHEMO, POCHCT in the last 72 hours. Coags:   Lab Results   Component Value Date    PROTIME 12.9 03/02/2021    INR 0.97 03/02/2021    APTT 29.9 03/02/2021       HCG (If Applicable): No results found for: PREGTESTUR, PREGSERUM, HCG, HCGQUANT     ABGs: No results found for: PHART, PO2ART, UYS0UAL, YQQ1ZDJ, BEART, S7WAIKDM     Type & Screen (If Applicable):  No results found for: LABABO, LABRH    Drug/Infectious Status (If Applicable):  No results found for: HIV, HEPCAB    COVID-19 Screening (If Applicable):   Lab Results   Component Value Date    COVID19 Not Detected 12/04/2020           Anesthesia Evaluation  Patient summary reviewed and Nursing notes reviewed no history of anesthetic complications:   Airway: Mallampati: I  TM distance: >3 FB   Neck ROM: full  Mouth opening: > = 3 FB Dental:          Pulmonary:normal exam  breath sounds clear to auscultation  (+) current smoker                           Cardiovascular:    (+) hyperlipidemia        Rhythm: regular  Rate: normal                    Neuro/Psych:   (+) neuromuscular disease:, psychiatric history:depression/anxiety             GI/Hepatic/Renal: Neg GI/Hepatic/Renal ROS            Endo/Other:    (+) : arthritis: OA., .                  ROS comment: POSTERIOR NECK LIPOMA Abdominal:       Abdomen: soft. Vascular: negative vascular ROS.          Other Findings:             Anesthesia Plan      general     ASA 2       Induction: intravenous. MIPS: Postoperative opioids intended and Prophylactic antiemetics administered. Anesthetic plan and risks discussed with patient. Plan discussed with CRNA.                   Blas Manuel MD   5/10/2022

## 2022-05-10 NOTE — ANESTHESIA POSTPROCEDURE EVALUATION
POST- ANESTHESIA EVALUATION       Pt Name: Gonzalo Blackburn  MRN: [id number]  [place]: 1965  Date of evaluation: 5/10/2022  Time:  2:09 PM      /78   Pulse 96   Temp 97.4 °F (36.3 °C) (Infrared)   Resp 26   Ht 5' 3\" (1.6 m)   Wt 126 lb (57.2 kg)   LMP 03/14/2013   SpO2 98%   BMI 22.32 kg/m²      Consciousness Level  Awake  Cardiopulmonary Status  Stable  Pain Adequately Treated YES  Nausea / Vomiting  NO  Adequate Hydration  YES  Anesthesia Related Complications NONE      Electronically signed by Janie Ratliff MD on 5/10/2022 at 2:09 PM       Department of Anesthesiology  Postprocedure Note    Patient: Gonzalo Blackburn  MRN: [id number]  [place]: 1965  Date of evaluation: 5/10/2022  Time:  2:09 PM     Procedure Summary     Date: 05/10/22 Room / Location: [facility] OR 01 / [address]    Anesthesia Start: 7332 Anesthesia Stop: 8162    Procedure: EXCISION OF POSTERIOR NECK LIPOMA (N/A Neck) Diagnosis: (LIPOMA)    Surgeons: Jhonatan Benitez DO Responsible Provider: Janie Ratliff MD    Anesthesia Type: general ASA Status: 2          Anesthesia Type: No value filed. Timo Phase I: Timo Score: 10    Timo Phase II: Timo Score: 10    Last vitals: Reviewed and per EMR flowsheets.        Anesthesia Post Evaluation

## 2022-05-11 LAB — SURGICAL PATHOLOGY REPORT: NORMAL

## 2022-05-24 ENCOUNTER — TELEPHONE (OUTPATIENT)
Dept: NEUROLOGY | Age: 57
End: 2022-05-24

## 2022-05-24 RX ORDER — VENLAFAXINE HYDROCHLORIDE 75 MG/1
75 CAPSULE, EXTENDED RELEASE ORAL DAILY
Qty: 90 CAPSULE | Refills: 2 | Status: SHIPPED | OUTPATIENT
Start: 2022-05-24

## 2022-05-24 NOTE — TELEPHONE ENCOUNTER
Patient calling to request Effexor 75 mg. She stated that she was to call when she was almost finished with script for 37.5 mg. Please advise.

## 2022-06-21 RX ORDER — CYCLOBENZAPRINE HCL 10 MG
TABLET ORAL
Qty: 30 TABLET | Refills: 3 | Status: SHIPPED | OUTPATIENT
Start: 2022-06-21 | End: 2022-07-07 | Stop reason: SDUPTHER

## 2022-06-21 NOTE — TELEPHONE ENCOUNTER
Pharmacy requesting refill of cyclobenzaprine (FLEXERIL) 10 MG tablet      Medication active on med list yes      Date of last Rx: 1/12/2022 with 3 refills          verified by ISABELLA WARD      Date of last appointment 1/6/2022    Next Visit Date:  7/7/2022

## 2022-07-07 ENCOUNTER — OFFICE VISIT (OUTPATIENT)
Dept: NEUROLOGY | Age: 57
End: 2022-07-07
Payer: COMMERCIAL

## 2022-07-07 VITALS
DIASTOLIC BLOOD PRESSURE: 84 MMHG | WEIGHT: 129 LBS | BODY MASS INDEX: 23.74 KG/M2 | HEIGHT: 62 IN | SYSTOLIC BLOOD PRESSURE: 126 MMHG

## 2022-07-07 DIAGNOSIS — G90.521 COMPLEX REGIONAL PAIN SYNDROME I OF RIGHT LOWER LIMB: Primary | ICD-10-CM

## 2022-07-07 DIAGNOSIS — M48.04 THORACIC SPINAL STENOSIS: ICD-10-CM

## 2022-07-07 PROCEDURE — 4004F PT TOBACCO SCREEN RCVD TLK: CPT | Performed by: NURSE PRACTITIONER

## 2022-07-07 PROCEDURE — G8420 CALC BMI NORM PARAMETERS: HCPCS | Performed by: NURSE PRACTITIONER

## 2022-07-07 PROCEDURE — G8427 DOCREV CUR MEDS BY ELIG CLIN: HCPCS | Performed by: NURSE PRACTITIONER

## 2022-07-07 PROCEDURE — 99214 OFFICE O/P EST MOD 30 MIN: CPT | Performed by: NURSE PRACTITIONER

## 2022-07-07 PROCEDURE — 3017F COLORECTAL CA SCREEN DOC REV: CPT | Performed by: NURSE PRACTITIONER

## 2022-07-07 RX ORDER — CYCLOBENZAPRINE HCL 10 MG
10 TABLET ORAL 3 TIMES DAILY
Qty: 270 TABLET | Refills: 1 | Status: SHIPPED | OUTPATIENT
Start: 2022-07-07

## 2022-07-13 ENCOUNTER — OFFICE VISIT (OUTPATIENT)
Dept: FAMILY MEDICINE CLINIC | Age: 57
End: 2022-07-13
Payer: COMMERCIAL

## 2022-07-13 VITALS
RESPIRATION RATE: 17 BRPM | SYSTOLIC BLOOD PRESSURE: 140 MMHG | TEMPERATURE: 97.7 F | BODY MASS INDEX: 23.74 KG/M2 | WEIGHT: 129 LBS | HEIGHT: 62 IN | HEART RATE: 125 BPM | DIASTOLIC BLOOD PRESSURE: 71 MMHG | OXYGEN SATURATION: 97 %

## 2022-07-13 DIAGNOSIS — M48.04 THORACIC SPINAL STENOSIS: ICD-10-CM

## 2022-07-13 DIAGNOSIS — M54.2 CERVICAL PAIN (NECK): ICD-10-CM

## 2022-07-13 DIAGNOSIS — E78.5 HYPERLIPIDEMIA, UNSPECIFIED HYPERLIPIDEMIA TYPE: ICD-10-CM

## 2022-07-13 DIAGNOSIS — Z00.00 WELL ADULT EXAM: ICD-10-CM

## 2022-07-13 DIAGNOSIS — G90.521 COMPLEX REGIONAL PAIN SYNDROME I OF RIGHT LOWER LIMB: Primary | ICD-10-CM

## 2022-07-13 PROCEDURE — G8420 CALC BMI NORM PARAMETERS: HCPCS | Performed by: FAMILY MEDICINE

## 2022-07-13 PROCEDURE — 4004F PT TOBACCO SCREEN RCVD TLK: CPT | Performed by: FAMILY MEDICINE

## 2022-07-13 PROCEDURE — G8427 DOCREV CUR MEDS BY ELIG CLIN: HCPCS | Performed by: FAMILY MEDICINE

## 2022-07-13 PROCEDURE — 3017F COLORECTAL CA SCREEN DOC REV: CPT | Performed by: FAMILY MEDICINE

## 2022-07-13 PROCEDURE — 99213 OFFICE O/P EST LOW 20 MIN: CPT | Performed by: FAMILY MEDICINE

## 2022-07-13 RX ORDER — OXYCODONE AND ACETAMINOPHEN 10; 325 MG/1; MG/1
1 TABLET ORAL EVERY 8 HOURS PRN
Qty: 90 TABLET | Refills: 0 | Status: SHIPPED | OUTPATIENT
Start: 2022-07-13 | End: 2022-08-12

## 2022-07-13 SDOH — ECONOMIC STABILITY: FOOD INSECURITY: WITHIN THE PAST 12 MONTHS, YOU WORRIED THAT YOUR FOOD WOULD RUN OUT BEFORE YOU GOT MONEY TO BUY MORE.: NEVER TRUE

## 2022-07-13 SDOH — ECONOMIC STABILITY: FOOD INSECURITY: WITHIN THE PAST 12 MONTHS, THE FOOD YOU BOUGHT JUST DIDN'T LAST AND YOU DIDN'T HAVE MONEY TO GET MORE.: NEVER TRUE

## 2022-07-13 ASSESSMENT — PATIENT HEALTH QUESTIONNAIRE - PHQ9
8. MOVING OR SPEAKING SO SLOWLY THAT OTHER PEOPLE COULD HAVE NOTICED. OR THE OPPOSITE, BEING SO FIGETY OR RESTLESS THAT YOU HAVE BEEN MOVING AROUND A LOT MORE THAN USUAL: 0
SUM OF ALL RESPONSES TO PHQ QUESTIONS 1-9: 5
6. FEELING BAD ABOUT YOURSELF - OR THAT YOU ARE A FAILURE OR HAVE LET YOURSELF OR YOUR FAMILY DOWN: 0
10. IF YOU CHECKED OFF ANY PROBLEMS, HOW DIFFICULT HAVE THESE PROBLEMS MADE IT FOR YOU TO DO YOUR WORK, TAKE CARE OF THINGS AT HOME, OR GET ALONG WITH OTHER PEOPLE: 0
9. THOUGHTS THAT YOU WOULD BE BETTER OFF DEAD, OR OF HURTING YOURSELF: 0
SUM OF ALL RESPONSES TO PHQ QUESTIONS 1-9: 5
5. POOR APPETITE OR OVEREATING: 0
SUM OF ALL RESPONSES TO PHQ QUESTIONS 1-9: 5
1. LITTLE INTEREST OR PLEASURE IN DOING THINGS: 0
SUM OF ALL RESPONSES TO PHQ QUESTIONS 1-9: 5
7. TROUBLE CONCENTRATING ON THINGS, SUCH AS READING THE NEWSPAPER OR WATCHING TELEVISION: 1
4. FEELING TIRED OR HAVING LITTLE ENERGY: 3
3. TROUBLE FALLING OR STAYING ASLEEP: 1

## 2022-07-13 ASSESSMENT — SOCIAL DETERMINANTS OF HEALTH (SDOH): HOW HARD IS IT FOR YOU TO PAY FOR THE VERY BASICS LIKE FOOD, HOUSING, MEDICAL CARE, AND HEATING?: NOT VERY HARD

## 2022-07-13 NOTE — PROGRESS NOTES
[facility]  [address] [place] [address] [address] [facility] [address]  Dept: [phone]      Ghulam Richardson is a 64 y.o. female who presents today for follow up on her  medical conditions as noted below. Chief Complaint   Patient presents with    Discuss Medications       Patient Active Problem List:     Hyperlipidemia     Cervical pain (neck)     Hypercholesterolemia     Dysesthesia     Depression     Complex regional pain syndrome i of right lower limb     Thoracic spinal stenosis     Lipoma of neck     Past Medical History:   Diagnosis Date    Arthritis     rheumatology   Dr. Julio Burns    Chronic pain     Complex regional pain syndrome i of right lower limb 10/02/2019    pain mgmt   Dr. Ibis Gipson. Has pain stimulator.  CRPS (complex regional pain syndrome type I)     Depression 06/27/2019    counseling    Fibromyalgia     Hyperlipidemia     pcp manages    Snores     airway resistance- getting a cpap    Tachycardia     Dr. Wilian Loving last seen 2021    Wellness examination     Virgen Pencil last seen 1/2022      Past Surgical History:   Procedure Laterality Date    CYSTOSCOPY N/A 1/14/2022    CYSTOSCOPY performed by Anthony Mcmanus MD at [facility] ARTHROSCOPY      rt knee 3/2014    LIPOMA RESECTION N/A 05/10/2022     POSTERIOR NECK LIPOMA BIOPSY EXCISION (N/A Neck)    NECK SURGERY N/A 5/10/2022    EXCISION OF POSTERIOR NECK LIPOMA performed by Belia Cerda DO at [facility]  03/2021    WISDOM TOOTH EXTRACTION       Family History   Adopted: Yes   Family history unknown: Yes       Current Outpatient Medications   Medication Sig Dispense Refill    oxyCODONE-acetaminophen (PERCOCET)  MG per tablet Take 1 tablet by mouth every 8 hours as needed for Pain for up to 30 days.  Intended supply: 30 days 90 tablet 0    cyclobenzaprine (FLEXERIL) 10 MG tablet Take 1 tablet by mouth 3 times daily 270 tablet 1    venlafaxine (EFFEXOR XR) 75 MG extended release capsule Take 1 capsule by mouth daily 90 capsule 2    oxyCODONE-acetaminophen (PERCOCET) 5-325 MG per tablet Take 1 tablet by mouth every 4 hours as needed for Pain.  celecoxib (CELEBREX) 200 MG capsule TAKE 1 CAPSULE DAILY 90 capsule 3    simvastatin (ZOCOR) 80 MG tablet TAKE 1 TABLET NIGHTLY (Patient taking differently: Take 40 mg by mouth 1/2 tab) 90 tablet 3    doxyLAMINE succinate (GNP SLEEP AID) 25 MG tablet Take 25 mg by mouth       No current facility-administered medications for this visit. ALLERGIES:    Allergies   Allergen Reactions    Trazodone And Nefazodone Anaphylaxis    Nucynta [Tapentadol] Nausea And Vomiting       Social History     Tobacco Use    Smoking status: Current Every Day Smoker     Packs/day: 0.50     Years: 20.00     Pack years: 10.00     Types: Cigarettes    Smokeless tobacco: Never Used   Substance Use Topics    Alcohol use: Yes     Alcohol/week: 0.0 standard drinks     Comment: rarely        LDL Calculated (mg/dL)   Date Value   02/25/2020 140     HDL (mg/dL)   Date Value   02/25/2020 39     BUN (mg/dL)   Date Value   03/02/2021 18     CREATININE (mg/dL)   Date Value   03/02/2021 0.71     Glucose (mg/dL)   Date Value   03/02/2021 77     Hemoglobin A1C (%)   Date Value   02/25/2020 5.6              Subjective:      HPI  Is being seen today stating she is going to be having a spinal cord stimulator placed for the second time they changed devices the trial of this worked well for her her concern is they did not give her anything to help with pain post surgery and also she has to be off Celebrex for a week prior  I have reviewed to take for pain and she would like some Percocet  OARRS was reviewed and shows no signs of abuse  She is also due for laboratory profile    Review of Systems:     Constitutional: Negative for fever, appetite change and fatigue.         Family social and medical history reviewed and unchanged     HENT: Negative. Negative for nosebleeds, trouble swallowing and neck pain. Eyes: Negative for photophobia and visual disturbance. Respiratory: Negative. Negative for chest tightness and shortness of breath. Cardiovascular: Negative. Negative for chest pain and leg swelling. Gastrointestinal: Negative. Negative for abdominal pain and blood in stool. Endocrine: Negative for cold intolerance and polyuria. Genitourinary: Negative for dysuria and hematuria. Musculoskeletal: Negative. Skin: Negative for rash. Allergic/Immunologic: Negative. Neurological: Negative. Negative for dizziness, weakness and numbness. Hematological: Negative. Negative for adenopathy. Does not bruise/bleed easily. Psychiatric/Behavioral: Negative for sleep disturbance, dysphoric mood and  decreased concentration. The patient is not nervous/anxious. Objective:     Physical Exam:     Nursing note and vitals reviewed. BP (!) 140/71 (Site: Right Upper Arm, Position: Sitting, Cuff Size: Small Adult)   Pulse (!) 125   Temp 97.7 °F (36.5 °C)   Resp 17   Ht 5' 2\" (1.575 m)   Wt 129 lb (58.5 kg)   LMP 03/14/2013   SpO2 97%   BMI 23.59 kg/m²   Constitutional: She is oriented to person, place, and time. She   appears well-developed and well-nourished. HENT:   Head: Normocephalic and atraumatic. Right Ear: External ear normal. Tympanic membrane is not erythematous. No middle ear effusion. Left Ear: External ear normal. Tympanic membrane is not erythematous. No middle ear effusion. Nose: No mucosal edema. Mouth/Throat: Oropharynx is clear and moist. No posterior oropharyngeal erythema. Eyes: Conjunctivae and EOM are normal. Pupils are equal, round, and reactive to light. Neck: Normal range of motion. Neck supple. No thyromegaly present. Cardiovascular: Normal rate, regular rhythm and normal heart sounds. No murmur heard.   Pulmonary/Chest: Effort normal and breath sounds normal. She has no wheezes. Shehas no rales. Abdominal: Soft. Bowel sounds are normal. She exhibits no distension and no mass. There is no tenderness. There is no rebound and no guarding. Genitourinary/Anorectal:deferred  Musculoskeletal: Normal range of motion. She exhibits no edema or tenderness. Lymphadenopathy: She has no cervical adenopathy. Neurological: She is alert and oriented to person, place, and time. She has normal reflexes. Skin: Skin is warm and dry. No rash noted. Psychiatric: She has a normal mood and affect. Her   behavior is normal.       Assessment:      1. Complex regional pain syndrome i of right lower limb    2. Cervical pain (neck)    3. Thoracic spinal stenosis    4. Hyperlipidemia, unspecified hyperlipidemia type    5. Well adult exam          Plan:      Call or return to clinic prn if these symptoms worsen or fail to improve as anticipated. I have reviewed the instructions with the patient, answering all questions to her satisfaction. Return if symptoms worsen or fail to improve. Orders Placed This Encounter   Procedures    CBC with Auto Differential     Standing Status:   Future     Standing Expiration Date:   1/13/2023    Comprehensive Metabolic Panel     Standing Status:   Future     Standing Expiration Date:   1/13/2023    T4, Free     Standing Status:   Future     Standing Expiration Date:   1/13/2023    Lipid Panel     Standing Status:   Future     Standing Expiration Date:   1/13/2023     Order Specific Question:   Is Patient Fasting?/# of Hours     Answer:   yes    TSH     Standing Status:   Future     Standing Expiration Date:   1/13/2023    Vitamin D 25 Hydroxy     Standing Status:   Future     Standing Expiration Date:   7/13/2023     Orders Placed This Encounter   Medications    oxyCODONE-acetaminophen (PERCOCET)  MG per tablet     Sig: Take 1 tablet by mouth every 8 hours as needed for Pain for up to 30 days.  Intended supply: 30

## 2022-07-15 LAB
ALBUMIN SERPL-MCNC: NORMAL G/DL
ALP BLD-CCNC: NORMAL U/L
ALT SERPL-CCNC: NORMAL U/L
ANION GAP SERPL CALCULATED.3IONS-SCNC: NORMAL MMOL/L
AST SERPL-CCNC: NORMAL U/L
BASOPHILS ABSOLUTE: NORMAL
BASOPHILS RELATIVE PERCENT: NORMAL
BILIRUB SERPL-MCNC: NORMAL MG/DL
BUN BLDV-MCNC: NORMAL MG/DL
CALCIUM SERPL-MCNC: NORMAL MG/DL
CHLORIDE BLD-SCNC: NORMAL MMOL/L
CHOLESTEROL, TOTAL: 213 MG/DL
CHOLESTEROL/HDL RATIO: 4.2
CO2: NORMAL
CREAT SERPL-MCNC: NORMAL MG/DL
EOSINOPHILS ABSOLUTE: NORMAL
EOSINOPHILS RELATIVE PERCENT: NORMAL
GFR CALCULATED: NORMAL
GLUCOSE BLD-MCNC: NORMAL MG/DL
HCT VFR BLD CALC: NORMAL %
HDLC SERPL-MCNC: 51 MG/DL (ref 35–70)
HEMOGLOBIN: NORMAL
LDL CHOLESTEROL CALCULATED: 116 MG/DL (ref 0–160)
LYMPHOCYTES ABSOLUTE: NORMAL
LYMPHOCYTES RELATIVE PERCENT: NORMAL
MCH RBC QN AUTO: NORMAL PG
MCHC RBC AUTO-ENTMCNC: NORMAL G/DL
MCV RBC AUTO: NORMAL FL
MONOCYTES ABSOLUTE: NORMAL
MONOCYTES RELATIVE PERCENT: NORMAL
NEUTROPHILS ABSOLUTE: NORMAL
NEUTROPHILS RELATIVE PERCENT: NORMAL
NONHDLC SERPL-MCNC: NORMAL MG/DL
PDW BLD-RTO: NORMAL %
PLATELET # BLD: NORMAL 10*3/UL
PMV BLD AUTO: NORMAL FL
POTASSIUM SERPL-SCNC: NORMAL MMOL/L
RBC # BLD: NORMAL 10*6/UL
SODIUM BLD-SCNC: NORMAL MMOL/L
T4 FREE: NORMAL
TOTAL PROTEIN: NORMAL
TRIGL SERPL-MCNC: 232 MG/DL
TSH SERPL DL<=0.05 MIU/L-ACNC: NORMAL M[IU]/L
VITAMIN D 25-HYDROXY: NORMAL
VITAMIN D2, 25 HYDROXY: NORMAL
VITAMIN D3,25 HYDROXY: NORMAL
VLDLC SERPL CALC-MCNC: 46 MG/DL
WBC # BLD: NORMAL 10*3/UL

## 2022-07-18 DIAGNOSIS — Z00.00 WELL ADULT EXAM: ICD-10-CM

## 2022-07-18 DIAGNOSIS — E78.5 HYPERLIPIDEMIA, UNSPECIFIED HYPERLIPIDEMIA TYPE: ICD-10-CM

## 2022-08-09 ENCOUNTER — OFFICE VISIT (OUTPATIENT)
Dept: DERMATOLOGY | Age: 57
End: 2022-08-09
Payer: COMMERCIAL

## 2022-08-09 VITALS
WEIGHT: 131.4 LBS | TEMPERATURE: 97.7 F | DIASTOLIC BLOOD PRESSURE: 78 MMHG | HEART RATE: 122 BPM | OXYGEN SATURATION: 97 % | SYSTOLIC BLOOD PRESSURE: 125 MMHG | BODY MASS INDEX: 24.18 KG/M2 | HEIGHT: 62 IN

## 2022-08-09 DIAGNOSIS — L72.0 MILIUM CYST: Primary | ICD-10-CM

## 2022-08-09 PROCEDURE — 10060 I&D ABSCESS SIMPLE/SINGLE: CPT | Performed by: PHYSICIAN ASSISTANT

## 2022-08-09 NOTE — PROGRESS NOTES
Dermatology Procedure Note   [address]  [address]  Dept: [phone]  Dept Fax: [phone]      Procedure Date: 8/9/2022  Procedure Time: 3:14 PM    Procedure Practitioner: Jessica Kingston PA-C    Procedure: Incision and Drainage    Pre-Procedure Diagnosis: Cyst    Post-Procedure Diagnosis: Same as Pre-Procedure Diagnosis    Informed Consent: The procedure and its risks were explained including but not limited to pain, bleeding, infection, permanent scar, permanent pigment alteration and recurrence. Consent to proceed with the procedure was obtained from the patient or the parent by the practitioner    Time Out:  A time out was conducted immediately before starting the procedure that confirmed a final verification of the correct patient, correct procedure, and correct site. Procedure Details:  Incision and drainage: Risks/benefits of procedure including but not limited to bleeding, infection, scarring discussed. Patient would like to proceed with I&D of lesion. Area prepared in usual fashion with 70% isopropyl alcohol. Anesthesia was administered as a ring block using 1% lidocaine with 1:100,000 epinephrine. Adequate linear incision over the lesion apex was made with sterile 11 blade. Manual centripetal pressure was applied to thoroughly express lesional contents. Sharp dissection was used to remove the lining of the cyst.  Contents were not sent for culture. Hemostasis achieved spontaneously and with pressure. A bulky wound dressing was applied in usual fashion. The patient tolerated the procedure well. There were no immediate complications.   Wound care was verbally discussed and included in written format in patient's AVS.     Procedure Performed By: Jessica Kingston PA-C    Estimated Blood Loss: Minimal    Pathologic Specimen: none sent    Procedure Tolerance: Good    Complication(s): None    Electronically signed by Harlow Bamberger, PA-C on 8/9/22 at 3:14 PM EDT

## 2022-10-18 ENCOUNTER — TELEMEDICINE (OUTPATIENT)
Dept: FAMILY MEDICINE CLINIC | Age: 57
End: 2022-10-18
Payer: COMMERCIAL

## 2022-10-18 DIAGNOSIS — J06.9 ACUTE URI: Primary | ICD-10-CM

## 2022-10-18 PROCEDURE — G8420 CALC BMI NORM PARAMETERS: HCPCS | Performed by: FAMILY MEDICINE

## 2022-10-18 PROCEDURE — G8484 FLU IMMUNIZE NO ADMIN: HCPCS | Performed by: FAMILY MEDICINE

## 2022-10-18 PROCEDURE — G8427 DOCREV CUR MEDS BY ELIG CLIN: HCPCS | Performed by: FAMILY MEDICINE

## 2022-10-18 PROCEDURE — 4004F PT TOBACCO SCREEN RCVD TLK: CPT | Performed by: FAMILY MEDICINE

## 2022-10-18 PROCEDURE — 3017F COLORECTAL CA SCREEN DOC REV: CPT | Performed by: FAMILY MEDICINE

## 2022-10-18 PROCEDURE — 99213 OFFICE O/P EST LOW 20 MIN: CPT | Performed by: FAMILY MEDICINE

## 2022-10-18 RX ORDER — BENZONATATE 200 MG/1
200 CAPSULE ORAL 3 TIMES DAILY PRN
Qty: 30 CAPSULE | Refills: 0 | Status: SHIPPED | OUTPATIENT
Start: 2022-10-18 | End: 2022-10-25

## 2022-10-18 RX ORDER — CEFUROXIME AXETIL 500 MG/1
500 TABLET ORAL 2 TIMES DAILY
Qty: 20 TABLET | Refills: 0 | Status: SHIPPED | OUTPATIENT
Start: 2022-10-18 | End: 2022-10-28

## 2022-10-18 NOTE — PROGRESS NOTES
10/18/2022    TELEHEALTH EVALUATION -- Audio/Visual (During UCXCJ-45 public health emergency)    HPI:    Blank James (:  1965) has requested an audio/video evaluation for the following concern(s):    She is being seen today stating for 2 weeks states that congestion and drainage mild coughing and has not been feeling well she is COVID-negative    Review of Systems    Prior to Visit Medications    Medication Sig Taking? Authorizing Provider   cefUROXime (CEFTIN) 500 MG tablet Take 1 tablet by mouth 2 times daily for 10 days Yes Zhane Farah DO   benzonatate (TESSALON) 200 MG capsule Take 1 capsule by mouth 3 times daily as needed for Cough Yes Zhane Farah DO   cyclobenzaprine (FLEXERIL) 10 MG tablet Take 1 tablet by mouth 3 times daily  CONY Todd CNP   venlafaxine (EFFEXOR XR) 75 MG extended release capsule Take 1 capsule by mouth daily  CONY Todd CNP   oxyCODONE-acetaminophen (PERCOCET) 5-325 MG per tablet Take 1 tablet by mouth every 4 hours as needed for Pain. Historical Provider, MD   celecoxib (CELEBREX) 200 MG capsule TAKE 1 CAPSULE DAILY  Zhane Farah DO   simvastatin (ZOCOR) 80 MG tablet TAKE 1 TABLET NIGHTLY  Patient taking differently: Take 40 mg by mouth 1/2 tab  Zhane Farah DO   doxyLAMINE succinate (GNP SLEEP AID) 25 MG tablet Take 25 mg by mouth  Historical Provider, MD       Social History     Tobacco Use    Smoking status: Every Day     Packs/day: 0.50     Years: 20.00     Pack years: 10.00     Types: Cigarettes    Smokeless tobacco: Never   Vaping Use    Vaping Use: Former    Substances: Occasionally   Substance Use Topics    Alcohol use:  Yes     Alcohol/week: 0.0 standard drinks     Comment: rarely    Drug use: No        Allergies   Allergen Reactions    Trazodone And Nefazodone Anaphylaxis    Nucynta [Tapentadol] Nausea And Vomiting   ,   Past Medical History:   Diagnosis Date    Arthritis     rheumatology   Dr. Ashely Her pain     Complex regional pain syndrome i of right lower limb 10/02/2019    pain mgmt   Dr. Stefany Ortega. Has pain stimulator. CRPS (complex regional pain syndrome type I)     Depression 06/27/2019    counseling    Fibromyalgia     Hyperlipidemia     pcp manages    Snores     airway resistance- getting a cpap    Tachycardia     Dr. Iva Sandhu last seen 2021    Wellness examination     Nik Abreu last seen 1/2022   ,   Past Surgical History:   Procedure Laterality Date    CYSTOSCOPY N/A 1/14/2022    CYSTOSCOPY performed by Chay Mustafa MD at [address] [place] [address] ARTHROSCOPY      rt knee 3/2014    LIPOMA RESECTION N/A 05/10/2022     POSTERIOR NECK LIPOMA BIOPSY EXCISION (N/A Neck)    NECK SURGERY N/A 5/10/2022    EXCISION OF POSTERIOR NECK LIPOMA performed by Pernell Poon DO at [place] [address]  03/2021    WISDOM TOOTH EXTRACTION     ,   Social History     Tobacco Use    Smoking status: Every Day     Packs/day: 0.50     Years: 20.00     Pack years: 10.00     Types: Cigarettes    Smokeless tobacco: Never   Vaping Use    Vaping Use: Former    Substances: Occasionally   Substance Use Topics    Alcohol use:  Yes     Alcohol/week: 0.0 standard drinks     Comment: rarely    Drug use: No   ,   Family History   Adopted: Yes   Family history unknown: Yes   ,   Immunization History   Administered Date(s) Administered    COVID-19, MODERNA BLUE border, Primary or Immunocompromised, (age 12y+), IM, 100 mcg/0.5mL 03/23/2021, 04/20/2021, 12/17/2021    Influenza 12/13/2011, 10/05/2012, 11/14/2013    Influenza Virus Vaccine 11/14/2013    Influenza, AFLURIA (age 1 yrs+), FLUZONE, (age 10 mo+), MDV, 0.5mL 12/22/2016    Influenza, FLUARIX, FLULAVAL, FLUZONE (age 10 mo+) AND AFLURIA, (age 1 y+), PF, 0.5mL 02/20/2020    Influenza, FLUBLOK, (age 25 y+), PF, 0.5mL 10/18/2021    Pneumococcal Conjugate 13-valent ([id number]) 12/22/2016    Pneumococcal Polysaccharide (Pafoxuvno29) 02/20/2020 Zoster Recombinant (Shingrix) 08/15/2021, 10/18/2021       PHYSICAL EXAMINATION:  [ INSTRUCTIONS:  \"[x]\" Indicates a positive item  \"[]\" Indicates a negative item  -- DELETE ALL ITEMS NOT EXAMINED]  Vital Signs: (As obtained by patient/caregiver or practitioner observation)    Blood pressure-  Heart rate-    Respiratory rate-    Temperature-  Pulse oximetry-     Constitutional: [x] Appears well-developed and well-nourished [x] No apparent distress      [] Abnormal-   Mental status  [x] Alert and awake  [x] Oriented to person/place/time [x]Able to follow commands      Eyes:  EOM    [x]  Normal  [] Abnormal-  Sclera  [x]  Normal  [] Abnormal -         Discharge [x]  None visible  [] Abnormal -    HENT:   [x] Normocephalic, atraumatic. [] Abnormal   [x] Mouth/Throat: Mucous membranes are moist.     External Ears [x] Normal  [] Abnormal-     Neck: [x] No visualized mass     Pulmonary/Chest: [x] Respiratory effort normal.  [x] No visualized signs of difficulty breathing or respiratory distress        [] Abnormal-      Musculoskeletal:   [x] Normal gait with no signs of ataxia         [x] Normal range of motion of neck        [] Abnormal-       Neurological:        [x] No Facial Asymmetry (Cranial nerve 7 motor function) (limited exam to video visit)          [x] No gaze palsy        [] Abnormal-         Skin:        [x] No significant exanthematous lesions or discoloration noted on facial skin         [] Abnormal-            Psychiatric:       [x] Normal Affect [x] No Hallucinations        [] Abnormal-     Other pertinent observable physical exam findings-     ASSESSMENT/PLAN:   Diagnosis Orders   1. Acute URI           No orders of the defined types were placed in this encounter.     Requested Prescriptions     Signed Prescriptions Disp Refills    cefUROXime (CEFTIN) 500 MG tablet 20 tablet 0     Sig: Take 1 tablet by mouth 2 times daily for 10 days    benzonatate (TESSALON) 200 MG capsule 30 capsule 0     Sig: Take 1 capsule by mouth 3 times daily as needed for Cough       No follow-ups on file. Lizett Amaro is a 62 y.o. female being evaluated by a Virtual Visit (video visit) encounter to address concerns as mentioned above. A caregiver was present when appropriate. Due to this being a TeleHealth encounter (During [facility]- public health emergency), evaluation of the following organ systems was limited: Vitals/Constitutional/EENT/Resp/CV/GI//MS/Neuro/Skin/Heme-Lymph-Imm. Pursuant to the emergency declaration under the [facility], [address] [place] [address] authority and the [employer] Act, this Virtual Visit was conducted with patient's (and/or legal guardian's) consent, to reduce the patient's risk of exposure to COVID-19 and provide necessary medical care. The patient (and/or legal guardian) has also been advised to contact this office for worsening conditions or problems, and seek emergency medical treatment and/or call 911 if deemed necessary. Patient identification was verified at the start of the visit: Yes    Total time spent on this encounter: Not billed by time    Services were provided through a video synchronous discussion virtually to substitute for in-person clinic visit. Patient and provider were located at their individual homes. --Cintia López DO on 10/18/2022 at 2:50 PM    An electronic signature was used to authenticate this note.

## 2022-12-01 DIAGNOSIS — M54.6 CHRONIC BILATERAL THORACIC BACK PAIN: ICD-10-CM

## 2022-12-01 DIAGNOSIS — G89.29 CHRONIC BILATERAL THORACIC BACK PAIN: ICD-10-CM

## 2022-12-01 RX ORDER — CELECOXIB 200 MG/1
CAPSULE ORAL
Qty: 90 CAPSULE | Refills: 3 | Status: SHIPPED | OUTPATIENT
Start: 2022-12-01

## 2022-12-01 NOTE — TELEPHONE ENCOUNTER
Anurag Gonzalez is calling to request a refill on the following medication(s):    Last Visit Date (If Applicable):  59/08/9975    Next Visit Date:    Visit date not found    Medication Request:  Requested Prescriptions     Pending Prescriptions Disp Refills    celecoxib (CELEBREX) 200 MG capsule 90 capsule 3     Sig: TAKE 1 CAPSULE DAILY

## 2022-12-02 RX ORDER — CYCLOBENZAPRINE HCL 10 MG
10 TABLET ORAL 3 TIMES DAILY
Qty: 270 TABLET | Refills: 1 | Status: SHIPPED | OUTPATIENT
Start: 2022-12-02

## 2022-12-02 RX ORDER — VENLAFAXINE HYDROCHLORIDE 75 MG/1
75 CAPSULE, EXTENDED RELEASE ORAL DAILY
Qty: 90 CAPSULE | Refills: 1 | Status: SHIPPED | OUTPATIENT
Start: 2022-12-02

## 2022-12-02 NOTE — TELEPHONE ENCOUNTER
Pharmacy requesting refill of cyclobenzaprine (FLEXERIL) 10 MG tablet      Medication active on med list yes      Date of last Rx: 07/07/2022 with 1 refills          verified by ISABELLA WARD      Date of last appointment 07/07/2022    Next Visit Date:  2/1/2023    Pharmacy requesting refill of venlafaxine (EFFEXOR XR) 75 MG extended release capsule      Medication active on med list yes      Date of last Rx: 05/24/2022 with 2 refills          verified by ISABELLA WARD      Date of last appointment 07/07/2022    Next Visit Date:  2/1/2023      Patient would like scripts sent to a new pharmacy. Please fill for Malik Fraga as she is out of office sick.

## 2022-12-06 DIAGNOSIS — G89.29 CHRONIC BILATERAL THORACIC BACK PAIN: ICD-10-CM

## 2022-12-06 DIAGNOSIS — M54.6 CHRONIC BILATERAL THORACIC BACK PAIN: ICD-10-CM

## 2022-12-06 DIAGNOSIS — E78.5 HYPERLIPIDEMIA, UNSPECIFIED HYPERLIPIDEMIA TYPE: ICD-10-CM

## 2022-12-06 DIAGNOSIS — G62.9 NEUROPATHY: ICD-10-CM

## 2022-12-06 RX ORDER — CELECOXIB 200 MG/1
CAPSULE ORAL
Qty: 90 CAPSULE | Refills: 3 | Status: SHIPPED | OUTPATIENT
Start: 2022-12-06

## 2022-12-06 RX ORDER — SIMVASTATIN 80 MG
TABLET ORAL
Qty: 90 TABLET | Refills: 3 | Status: SHIPPED | OUTPATIENT
Start: 2022-12-06

## 2022-12-06 NOTE — TELEPHONE ENCOUNTER
Nikkie Vang is calling to request a refill on the following medication(s):    Last Visit Date (If Applicable):  35/63/3471    Next Visit Date:    Visit date not found    Medication Request:  Requested Prescriptions     Pending Prescriptions Disp Refills    celecoxib (CELEBREX) 200 MG capsule [Pharmacy Med Name: CELECOXIB CAPS 200MG] 90 capsule 3     Sig: TAKE 1 CAPSULE DAILY    simvastatin (ZOCOR) 80 MG tablet [Pharmacy Med Name: SIMVASTATIN TABS 80MG] 90 tablet 3     Sig: TAKE 1 TABLET NIGHTLY

## 2023-01-04 ENCOUNTER — TELEPHONE (OUTPATIENT)
Dept: FAMILY MEDICINE CLINIC | Age: 58
End: 2023-01-04

## 2023-02-01 ENCOUNTER — OFFICE VISIT (OUTPATIENT)
Dept: NEUROLOGY | Age: 58
End: 2023-02-01
Payer: COMMERCIAL

## 2023-02-01 VITALS
HEIGHT: 62 IN | HEART RATE: 124 BPM | SYSTOLIC BLOOD PRESSURE: 133 MMHG | WEIGHT: 141 LBS | DIASTOLIC BLOOD PRESSURE: 80 MMHG | BODY MASS INDEX: 25.95 KG/M2

## 2023-02-01 DIAGNOSIS — F32.A DEPRESSION, UNSPECIFIED DEPRESSION TYPE: ICD-10-CM

## 2023-02-01 DIAGNOSIS — M48.04 THORACIC SPINAL STENOSIS: ICD-10-CM

## 2023-02-01 DIAGNOSIS — G90.521 COMPLEX REGIONAL PAIN SYNDROME I OF RIGHT LOWER LIMB: ICD-10-CM

## 2023-02-01 DIAGNOSIS — M54.12 CERVICAL RADICULOPATHY: Primary | ICD-10-CM

## 2023-02-01 DIAGNOSIS — R20.8 DYSESTHESIA: ICD-10-CM

## 2023-02-01 PROCEDURE — 99214 OFFICE O/P EST MOD 30 MIN: CPT | Performed by: NURSE PRACTITIONER

## 2023-02-01 NOTE — PROGRESS NOTES
[facility]            [address]          [place], [address] [facility]          Dept: [phone]          Dept Fax: [phone]    MD Stas Carmona MD Delaney Harrison, MD Terese Blamer, CNP            2/1/2023      HISTORY OF PRESENT ILLNESS:       I had the pleasure of seeing Khoi Walker, who returns for continuing neurologic care. The patient was seen last on July 7, 2022 for treatment of right knee dysesthesias, depression, airway resistance syndrome, and thoracic radiculopathy. The patient has right knee dysesthesias since an injury in 2013 and follows with pain management. She has complex regional pain syndrome which was improved with a spinal cord stimulator. She also has fibromyalgia and follows with rheumatology. The patient also has depression and is prescribed Effexor 75 mg daily. She does note that she had a bout of depression in December and considered changing antidepressants, however she is now returned to baseline. The patient also has midthoracic back pain due to disc herniations and cord flattening of the thoracic cord. For management she is prescribed Flexeril 10 mg 3 daily and follows with pain management and orthopedic surgery. She has a second spinal cord stimulator implanted in her upper thoracic spine which is improving her pain. She is currently complaining of almost like a dystonic feeling in her upper cervical area especially on the right. She notes cramping of her hands bilaterally. Testing reviewed:       MRI Thoracic Spine WO Contrast 6/21/2021  Impression:  Overall the MR of the thoracic spine is stable since 8/14/2020. The sizable T9-T10 disc herniation to the left of midline with impingement upon the left anterolateral thecal sac and flattening of the cord is stable. Other smaller disc bulges/protrusions are stable also. MRI Thoracic spine 08/14/2020  Impression: 1.  Large central slightly greater to left of midline disc herniation at T9-10 with cord flattening                      2. Small right paracentral disc protrusions at T7-8 and T9-10     -EMG/NCV study on January 16, 2019 which was normal.  -MRI of the lumbar spine showing an essentially normal exam for stated age. Mildly bulging L4-L5 disc without contact with neuro structures. No herniated discs, central canal or foraminal stenosis. Mild lower lumbar facet joint arthrosis including a small for jet joint cyst as reported,6/18/15      PAST MEDICAL HISTORY:         Diagnosis Date    Arthritis     rheumatology   Dr. Rakan Lopez    Chronic pain     Complex regional pain syndrome i of right lower limb 10/02/2019    pain mgmt   Dr. Thalia Wheeler. Has pain stimulator.     CRPS (complex regional pain syndrome type I)     Depression 06/27/2019    counseling    Fibromyalgia     Hyperlipidemia     pcp manages    Snores     airway resistance- getting a cpap    Tachycardia     Dr. Tony Spivey last seen 2021    Wellness examination     Romain Rodriguez last seen 1/2022        PAST SURGICAL HISTORY:         Procedure Laterality Date    CYSTOSCOPY N/A 1/14/2022    CYSTOSCOPY performed by Randy Andres MD at [facility] ARTHROSCOPY      rt knee 3/2014    LIPOMA RESECTION N/A 05/10/2022     POSTERIOR NECK LIPOMA BIOPSY EXCISION (N/A Neck)    NECK SURGERY N/A 5/10/2022    EXCISION OF POSTERIOR NECK LIPOMA performed by Hermann Stephens DO at [facility]  03/2021    WISDOM TOOTH EXTRACTION          SOCIAL HISTORY:     Social History     Socioeconomic History    Marital status:      Spouse name: Not on file    Number of children: Not on file    Years of education: Not on file    Highest education level: Not on file   Occupational History    Not on file   Tobacco Use    Smoking status: Every Day     Packs/day: 0.50     Years: 20.00     Pack years: 10.00     Types: Cigarettes Smokeless tobacco: Never   Vaping Use    Vaping Use: Former    Substances: Occasionally   Substance and Sexual Activity    Alcohol use: Yes     Alcohol/week: 0.0 standard drinks     Comment: rarely    Drug use: No    Sexual activity: Not on file   Other Topics Concern    Not on file   Social History Narrative    Not on file     Social Determinants of Health     Financial Resource Strain: Low Risk     Difficulty of Paying Living Expenses: Not very hard   Food Insecurity: No Food Insecurity    Worried About Running Out of Food in the Last Year: Never true    Ran Out of Food in the Last Year: Never true   Transportation Needs: Not on file   Physical Activity: Not on file   Stress: Not on file   Social Connections: Not on file   Intimate Partner Violence: Not on file   Housing Stability: Not on file       CURRENT MEDICATIONS:     Current Outpatient Medications   Medication Sig Dispense Refill    celecoxib (CELEBREX) 200 MG capsule TAKE 1 CAPSULE DAILY 90 capsule 3    simvastatin (ZOCOR) 80 MG tablet TAKE 1 TABLET NIGHTLY 90 tablet 3    venlafaxine (EFFEXOR XR) 75 MG extended release capsule Take 1 capsule by mouth daily 90 capsule 1    cyclobenzaprine (FLEXERIL) 10 MG tablet Take 1 tablet by mouth 3 times daily 270 tablet 1    doxyLAMINE succinate (GNP SLEEP AID) 25 MG tablet Take 25 mg by mouth      oxyCODONE-acetaminophen (PERCOCET) 5-325 MG per tablet Take 1 tablet by mouth every 4 hours as needed for Pain. (Patient not taking: Reported on 2/1/2023)       No current facility-administered medications for this visit. ALLERGIES:     Allergies   Allergen Reactions    Trazodone And Nefazodone Anaphylaxis    Nucynta [Tapentadol] Nausea And Vomiting                                 REVIEW OF SYSTEMS        All items selected indicate a positive finding. Those items not selected are negative.   Constitutional [] Weight loss/gain   [] Fatigue  [] Fever/Chills   HEENT [] Hearing Loss  [] Visual Disturbance  [] Tinnitus  [] Eye pain   Respiratory [] Shortness of Breath  [] Cough  [] Snoring   Cardiovascular [] Chest Pain  [] Palpitations  [] Lightheaded   GI [] Constipation  [] Diarrhea  [] Swallowing change  [] Nausea/vomiting    [] Urinary Frequency  [] Urinary Urgency   Musculoskeletal [x] Neck pain  [x] Back pain  [x] Muscle pain  [] Restless legs   Dermatologic [] Skin changes   Neurologic [] Memory loss/confusion  [] Seizures  [] Trouble walking or imbalance  [] Dizziness  [] Sleep disturbance  [] Weakness  [x] Numbness  [] Tremors  [] Speech Difficulty  [] Headaches  [] Light Sensitivity  [] Sound Sensitivity   Endocrinology []Excessive thirst  []Excessive hunger   Psychiatric [x] Anxiety/Depression  [] Hallucination   Allergy/immunology []Hives/environmental allergies   Hematologic/lymph [] Abnormal bleeding  [] Abnormal bruising         PHYSICAL EXAMINATION:       Vitals:    02/01/23 1040   BP: 133/80   Pulse: (!) 124                                              .                                                                                                     General Appearance:  Alert, cooperative, no signs of distress, appears stated age   Head:  Normocephalic, no signs of trauma   Eyes:  Conjunctiva/corneas clear;  eyelids intact   Ears:  Normal external ear and canals   Nose: Nares normal, mucosa normal, no drainage    Throat: Lips and tongue normal; teeth normal;  gums normal   Neck: Supple, intact flexion, extension and rotation;   trachea midline;  no adenopathy;   thyroid: not enlarged;   no carotid pulse abnormality   Back:   Symmetric, no curvature, ROM adequate   Lungs:   Respirations unlabored   Heart:  Regular rate and rhythm           Extremities: Extremities normal, no cyanosis, no edema   Pulses: Symmetric over head and neck   Skin: Skin color, texture normal, no rashes, no lesions                                     NEUROLOGIC EXAMINATION    Neurologic Exam  Mental status    Alert and oriented x 3; intact memory with no confusion, speech or language problems; no hallucinations or delusions  Fund of information appropriate for level of education    Cranial nerves    II - visual fields intact to confrontation bilaterally  III, IV, VI - extra-ocular muscles full: no pupillary defect; no NELIDA, no nystagmus, no ptosis   V - normal facial sensation                                                               VII - normal facial symmetry                                                             VIII - intact hearing                                                                             IX, X - symmetrical palate                                                                  XI - symmetrical shoulder shrug                                                       XII - tongue midline without atrophy or fasciculation      Motor function  Normal muscle bulk and tone; strength 5/5 on all 4 extremities, no pronator drift      Sensory function Intact to light touch, pinprick, vibration, proprioception on all 4 extremities      Cerebellar Intact fine motor movement. No involuntary movements or tremors. No ataxia or dysmetria on finger to nose or heel to shin testing      Reflex function DTR 2+ on bilateral UE and LE, symmetric. Down going toes bilaterally      Gait                   normal base and arm swing                  Medical Decision Making:       In summary, your patient, Katie Moore exhibits the following, with associated plan:    Dysesthesias of the right knee which is chronic since an injury in 2013.  She is diagnosed with complex regional pain syndrome  Continue to follow with pain management  Depression  Continue Effexor 75 mg daily  Midthoracic back pain due to disc herniations and thoracic cord flattening.  Continue to follow with pain management with 2 spinal cord stimulators in her thoracic spine  Neck pain with a dystonic feeling in her neck especially on the right and crampings of her hands  bilaterally. We will rule out a cervical myelopathy. Reflexes are 3+ in her upper extremities and 3+ in her lower extremities.   Dana sign was negative  MRI of the cervical spine without contrast            Signed: Isidro Alexander CNP      *Please note that portions of this note were completed with a voice recognition program.  Although every effort was made to insure the accuracy of this automated transcription, some errors in transcription may have occurred, occasionally words and are mis-transcribed

## 2023-03-08 ENCOUNTER — TELEPHONE (OUTPATIENT)
Dept: NEUROLOGY | Age: 58
End: 2023-03-08

## 2023-03-08 DIAGNOSIS — M54.12 CERVICAL RADICULOPATHY: Primary | ICD-10-CM

## 2023-03-08 NOTE — TELEPHONE ENCOUNTER
Marianna Osborne called stating that she is unable to have the MRI done because she has 2 spinal cord stimulators. She said that although they are both MRI compatible, the combination of the two is too much metal. Please advise.

## 2023-03-14 NOTE — TELEPHONE ENCOUNTER
Lm for patient with information of new test ordered. Advised patient to call office if she had any questions or concerns.

## 2023-03-31 DIAGNOSIS — E78.5 HYPERLIPIDEMIA, UNSPECIFIED HYPERLIPIDEMIA TYPE: ICD-10-CM

## 2023-03-31 DIAGNOSIS — G62.9 NEUROPATHY: ICD-10-CM

## 2023-03-31 RX ORDER — SIMVASTATIN 80 MG
80 TABLET ORAL NIGHTLY
Qty: 90 TABLET | Refills: 3 | Status: SHIPPED | OUTPATIENT
Start: 2023-03-31

## 2023-03-31 NOTE — TELEPHONE ENCOUNTER
Lloyd Stringer is calling to request a refill on the following medication(s):    Last Visit Date (If Applicable):  79/94/6332    Next Visit Date:    Visit date not found    Medication Request:  Requested Prescriptions     Pending Prescriptions Disp Refills    simvastatin (ZOCOR) 80 MG tablet 90 tablet 3     Sig: Take 1 tablet by mouth nightly

## 2023-04-05 ENCOUNTER — TELEPHONE (OUTPATIENT)
Dept: FAMILY MEDICINE CLINIC | Age: 58
End: 2023-04-05

## 2023-04-26 ENCOUNTER — HOSPITAL ENCOUNTER (OUTPATIENT)
Dept: CT IMAGING | Age: 58
Discharge: HOME | End: 2023-04-28
Payer: COMMERCIAL

## 2023-04-26 DIAGNOSIS — M54.12 CERVICAL RADICULOPATHY: ICD-10-CM

## 2023-04-26 PROCEDURE — 72125 CT NECK SPINE W/O DYE: CPT

## 2023-06-01 ENCOUNTER — OFFICE VISIT (OUTPATIENT)
Dept: NEUROLOGY | Age: 58
End: 2023-06-01
Payer: COMMERCIAL

## 2023-06-01 VITALS
HEART RATE: 118 BPM | DIASTOLIC BLOOD PRESSURE: 87 MMHG | BODY MASS INDEX: 25.21 KG/M2 | WEIGHT: 137 LBS | HEIGHT: 62 IN | SYSTOLIC BLOOD PRESSURE: 125 MMHG

## 2023-06-01 DIAGNOSIS — M54.2 CERVICAL PAIN (NECK): ICD-10-CM

## 2023-06-01 DIAGNOSIS — F32.89 OTHER DEPRESSION: ICD-10-CM

## 2023-06-01 DIAGNOSIS — G90.521 COMPLEX REGIONAL PAIN SYNDROME I OF RIGHT LOWER LIMB: Primary | ICD-10-CM

## 2023-06-01 DIAGNOSIS — M48.04 THORACIC SPINAL STENOSIS: ICD-10-CM

## 2023-06-01 PROCEDURE — 99214 OFFICE O/P EST MOD 30 MIN: CPT | Performed by: NURSE PRACTITIONER

## 2023-06-01 RX ORDER — CYCLOBENZAPRINE HCL 10 MG
10 TABLET ORAL 3 TIMES DAILY
Qty: 270 TABLET | Refills: 3 | Status: SHIPPED | OUTPATIENT
Start: 2023-06-01

## 2023-06-01 RX ORDER — VENLAFAXINE HYDROCHLORIDE 75 MG/1
75 CAPSULE, EXTENDED RELEASE ORAL DAILY
Qty: 90 CAPSULE | Refills: 3 | Status: SHIPPED | OUTPATIENT
Start: 2023-06-01

## 2023-06-01 NOTE — PROGRESS NOTES
[facility]            [address]          [place], [address] [facility]          Dept: [phone]          Dept Fax: [phone]    MD Stas Pleitez MD Londell Platt, MD Alcario Shearer, CNP            6/1/2023      HISTORY OF PRESENT ILLNESS:       I had the pleasure of seeing Donis Pretty, who returns for continuing neurologic care. The patient was seen last on February 1, 2023 for treatment of right knee dysesthesias, depression, thoracic radiculopathy and neck pain with dystonic feelings. The patient has right knee dysesthesias since an injury in 2013 and follows with pain management. She has complex regional pain syndrome which was improved with a spinal cord stimulator. She also has fibromyalgia and follows with rheumatology. She continues having dysesthesias of her right knee however notes it is more tolerable with the use of the spinal cord stimulator. The patient also has depression and is prescribed Effexor 75 mg daily. The patient also has midthoracic back pain due to disc herniations and cord flattening of the thoracic cord. For management she is prescribed Flexeril 10 mg 3 daily and follows with pain management and orthopedic surgery. She has a second spinal cord stimulator implanted in her upper thoracic spine which is improving her pain. She reports today she is in the process of receiving ablations after they are approved by her insurance. She complained of neck pain with a dystonic feeling in her neck and a CT of her cervical spine in April 2023 showed no acute abnormalities. An MRI was attempted however was unable to be completed as she has two spinal cord stimulators. She notes that both of her spinal cord stimulators are MRI compatible however due to the fact that there is two stimulators she is unable to complete MRIs as there is too much metal inside of her.  She reports today that her neck pain

## 2023-08-31 RX ORDER — VENLAFAXINE HYDROCHLORIDE 75 MG/1
75 CAPSULE, EXTENDED RELEASE ORAL DAILY
Qty: 90 CAPSULE | Refills: 3 | Status: SHIPPED | OUTPATIENT
Start: 2023-08-31

## 2023-08-31 NOTE — TELEPHONE ENCOUNTER
Pt's  stopped into the office and said he needed a refill of her Effexor. The medication was sent in on 6/1/23 for a year supply. He tried calling [facility] again and they stated they do not have the medication. I called and spoke with Christian Saravia. She said that they do not have it on the specialty med side. She transferred me to the mail order side. There I spoke with [facility]. She informed me as well that they do not have the medication. I tried to give a verbal but I was on hold too long. Can you please send this in for the patient.

## 2023-12-01 NOTE — TELEPHONE ENCOUNTER
Patient has switched insurances and requires a pharmacy change. A new script is needed. Pharmacy requesting refill of cyclobenzaprine 10mg. Medication active on med list yes      Date of last Rx: 6/1/2023 with 3 refills          verified by Baird Collet, [address] requesting refill of venlafaxine 75mg.       Medication active on med list yes      Date of last Rx: 8/31/2023 with 3 refills          verified by Baird Collet, LPN      Date of last appointment 6/1/2023    Next Visit Date:  12/7/2023

## 2023-12-04 RX ORDER — VENLAFAXINE HYDROCHLORIDE 75 MG/1
75 CAPSULE, EXTENDED RELEASE ORAL DAILY
Qty: 90 CAPSULE | Refills: 1 | Status: SHIPPED | OUTPATIENT
Start: 2023-12-04

## 2023-12-04 RX ORDER — CYCLOBENZAPRINE HCL 10 MG
10 TABLET ORAL 3 TIMES DAILY
Qty: 90 TABLET | Refills: 5 | Status: SHIPPED | OUTPATIENT
Start: 2023-12-04

## 2023-12-11 ENCOUNTER — OFFICE VISIT (OUTPATIENT)
Dept: FAMILY MEDICINE CLINIC | Age: 58
End: 2023-12-11
Payer: COMMERCIAL

## 2023-12-11 VITALS
DIASTOLIC BLOOD PRESSURE: 69 MMHG | BODY MASS INDEX: 25.06 KG/M2 | HEART RATE: 88 BPM | OXYGEN SATURATION: 96 % | SYSTOLIC BLOOD PRESSURE: 139 MMHG | WEIGHT: 137 LBS

## 2023-12-11 DIAGNOSIS — Z12.31 SCREENING MAMMOGRAM FOR BREAST CANCER: ICD-10-CM

## 2023-12-11 DIAGNOSIS — G62.9 NEUROPATHY: ICD-10-CM

## 2023-12-11 DIAGNOSIS — Z78.0 POST-MENOPAUSAL: ICD-10-CM

## 2023-12-11 DIAGNOSIS — M54.6 CHRONIC BILATERAL THORACIC BACK PAIN: ICD-10-CM

## 2023-12-11 DIAGNOSIS — Z00.00 WELL ADULT EXAM: Primary | ICD-10-CM

## 2023-12-11 DIAGNOSIS — E78.5 HYPERLIPIDEMIA, UNSPECIFIED HYPERLIPIDEMIA TYPE: ICD-10-CM

## 2023-12-11 DIAGNOSIS — E55.9 VITAMIN D DEFICIENCY: ICD-10-CM

## 2023-12-11 DIAGNOSIS — G89.29 CHRONIC BILATERAL THORACIC BACK PAIN: ICD-10-CM

## 2023-12-11 PROCEDURE — 99396 PREV VISIT EST AGE 40-64: CPT | Performed by: FAMILY MEDICINE

## 2023-12-11 RX ORDER — CELECOXIB 200 MG/1
200 CAPSULE ORAL DAILY
Qty: 90 CAPSULE | Refills: 3 | Status: SHIPPED | OUTPATIENT
Start: 2023-12-11

## 2023-12-11 RX ORDER — SIMVASTATIN 80 MG
80 TABLET ORAL NIGHTLY
Qty: 90 TABLET | Refills: 3 | Status: SHIPPED | OUTPATIENT
Start: 2023-12-11

## 2023-12-11 SDOH — ECONOMIC STABILITY: HOUSING INSECURITY
IN THE LAST 12 MONTHS, WAS THERE A TIME WHEN YOU DID NOT HAVE A STEADY PLACE TO SLEEP OR SLEPT IN A SHELTER (INCLUDING NOW)?: NO

## 2023-12-11 SDOH — ECONOMIC STABILITY: FOOD INSECURITY: WITHIN THE PAST 12 MONTHS, THE FOOD YOU BOUGHT JUST DIDN'T LAST AND YOU DIDN'T HAVE MONEY TO GET MORE.: NEVER TRUE

## 2023-12-11 SDOH — ECONOMIC STABILITY: FOOD INSECURITY: WITHIN THE PAST 12 MONTHS, YOU WORRIED THAT YOUR FOOD WOULD RUN OUT BEFORE YOU GOT MONEY TO BUY MORE.: NEVER TRUE

## 2023-12-11 SDOH — ECONOMIC STABILITY: INCOME INSECURITY: HOW HARD IS IT FOR YOU TO PAY FOR THE VERY BASICS LIKE FOOD, HOUSING, MEDICAL CARE, AND HEATING?: NOT HARD AT ALL

## 2023-12-11 ASSESSMENT — PATIENT HEALTH QUESTIONNAIRE - PHQ9
SUM OF ALL RESPONSES TO PHQ QUESTIONS 1-9: 0
SUM OF ALL RESPONSES TO PHQ9 QUESTIONS 1 & 2: 0
5. POOR APPETITE OR OVEREATING: 0
3. TROUBLE FALLING OR STAYING ASLEEP: 0
8. MOVING OR SPEAKING SO SLOWLY THAT OTHER PEOPLE COULD HAVE NOTICED. OR THE OPPOSITE, BEING SO FIGETY OR RESTLESS THAT YOU HAVE BEEN MOVING AROUND A LOT MORE THAN USUAL: 0
SUM OF ALL RESPONSES TO PHQ QUESTIONS 1-9: 0
SUM OF ALL RESPONSES TO PHQ QUESTIONS 1-9: 0
10. IF YOU CHECKED OFF ANY PROBLEMS, HOW DIFFICULT HAVE THESE PROBLEMS MADE IT FOR YOU TO DO YOUR WORK, TAKE CARE OF THINGS AT HOME, OR GET ALONG WITH OTHER PEOPLE: 0
1. LITTLE INTEREST OR PLEASURE IN DOING THINGS: 0
7. TROUBLE CONCENTRATING ON THINGS, SUCH AS READING THE NEWSPAPER OR WATCHING TELEVISION: 0
6. FEELING BAD ABOUT YOURSELF - OR THAT YOU ARE A FAILURE OR HAVE LET YOURSELF OR YOUR FAMILY DOWN: 0
SUM OF ALL RESPONSES TO PHQ QUESTIONS 1-9: 0
9. THOUGHTS THAT YOU WOULD BE BETTER OFF DEAD, OR OF HURTING YOURSELF: 0
2. FEELING DOWN, DEPRESSED OR HOPELESS: 0
4. FEELING TIRED OR HAVING LITTLE ENERGY: 0

## 2023-12-11 NOTE — PROGRESS NOTES
[facility]  [address] [place] [address] Road [address]  Dept: [phone]      Racheal Lisa is a 62 y.o. female who presents today for follow up on her  medical conditions as noted below. Chief Complaint   Patient presents with    Cholesterol Problem     Med refills    Back Pain     Med check       Patient Active Problem List:     Hyperlipidemia     Cervical pain (neck)     Hypercholesterolemia     Dysesthesia     Depression     Complex regional pain syndrome i of right lower limb     Thoracic spinal stenosis     Lipoma of neck     Past Medical History:   Diagnosis Date    Arthritis     rheumatology   Dr. Jayna Hopper    Chronic pain     Complex regional pain syndrome i of right lower limb 10/02/2019    pain mgmt   Dr. Michaele Ganser. Has pain stimulator. CRPS (complex regional pain syndrome type I)     Depression 06/27/2019    counseling    Fibromyalgia     Hyperlipidemia     pcp manages    Snores     airway resistance- getting a cpap    Tachycardia     Dr. Lesly Griffin last seen 2021    Wellness examination     [facility] last seen 1/2022      Past Surgical History:   Procedure Laterality Date    CYSTOSCOPY N/A 01/14/2022    CYSTOSCOPY performed by Genoveva Torres MD at [facility] ARTHROSCOPY      rt knee 3/2014    LIPOMA RESECTION N/A 05/10/2022     POSTERIOR NECK LIPOMA BIOPSY EXCISION (N/A Neck)    NECK SURGERY N/A 05/10/2022    EXCISION OF POSTERIOR NECK LIPOMA performed by Nilay Donovan DO at [address] [facility] [address]  03/2021    [facility] [employer] Alpha wave- PT has two devices, not MRI compatible due to having double device.  MAGNOLIA/PRICE 2/9/23    WISDOM TOOTH EXTRACTION       Family History   Adopted: Yes   Family history unknown: Yes       Current Outpatient Medications   Medication Sig Dispense Refill    simvastatin (ZOCOR) 80 MG tablet Take 1 tablet by mouth nightly 90 tablet 3

## 2024-01-31 ENCOUNTER — OFFICE VISIT (OUTPATIENT)
Dept: NEUROLOGY | Age: 59
End: 2024-01-31
Payer: COMMERCIAL

## 2024-01-31 VITALS
SYSTOLIC BLOOD PRESSURE: 135 MMHG | HEIGHT: 62 IN | BODY MASS INDEX: 25.03 KG/M2 | WEIGHT: 136 LBS | DIASTOLIC BLOOD PRESSURE: 67 MMHG | HEART RATE: 121 BPM

## 2024-01-31 DIAGNOSIS — M54.12 CERVICAL RADICULOPATHY: Primary | ICD-10-CM

## 2024-01-31 PROCEDURE — 99214 OFFICE O/P EST MOD 30 MIN: CPT | Performed by: STUDENT IN AN ORGANIZED HEALTH CARE EDUCATION/TRAINING PROGRAM

## 2024-01-31 RX ORDER — VENLAFAXINE HYDROCHLORIDE 150 MG/1
150 CAPSULE, EXTENDED RELEASE ORAL DAILY
Qty: 30 CAPSULE | Refills: 11 | Status: SHIPPED | OUTPATIENT
Start: 2024-01-31 | End: 2025-01-02

## 2024-01-31 RX ORDER — PREDNISONE 20 MG/1
TABLET ORAL
Qty: 18 TABLET | Refills: 0 | Status: SHIPPED | OUTPATIENT
Start: 2024-01-31

## 2024-01-31 NOTE — PROGRESS NOTES
[facility]            [address]          [place], [place] [address]          Dept: [phone]          Dept Fax: [phone]                1/31/2024      HISTORY OF PRESENT ILLNESS:       I had the pleasure of seeing Katie Moore, who returns fortreatment of right knee dysesthesias, depression, thoracic radiculopathy and neck pain with dystonic feelings.    Previously seen by Kendal Ash.  Prior records have been reviewed.  Patient was last seen in June 2023.    The patient has right knee dysesthesias since an injury in 2013 and follows with pain management.  She has complex regional pain syndrome which was improved with a spinal cord stimulator.  She also has fibromyalgia and follows with rheumatology. She continues having dysesthesias of her right knee however notes it is more tolerable with the use of the spinal cord stimulator.      The patient also has depression and is prescribed Effexor 75 mg daily.  She notes she is very stressed out about tax season and she is an . She notes effexor is working overall well but she notes she has no motivation to do things.     The patient also has midthoracic back pain due to disc herniations and cord flattening of the thoracic cord.  For management she is prescribed Flexeril 10 mg 3 daily and follows with pain management and orthopedic surgery.  She notes it helps with twitching but doesn't help with the muscle stiffness. She has a second spinal cord stimulator implanted in her upper thoracic spine which is improving her pain. She reports today she is in the process of receiving ablations after they are approved by her insurance.     She complained of neck pain with a dystonic feeling in her neck and a CT of her cervical spine in April 2023 showed no acute abnormalities. An MRI was attempted however was unable to be completed as she has two spinal cord stimulators. She notes she has significant neck

## 2024-02-09 LAB
ALBUMIN: 4 G/DL
ALK PHOSPHATASE: 69 U/L
ALT SERPL-CCNC: 27 U/L
AST SERPL-CCNC: 25 U/L
BASOPHILS ABSOLUTE: 0.09 X10^3UL
BASOPHILS RELATIVE PERCENT: 0.5 %
BILIRUB SERPL-MCNC: 0.5 MG/DL
BUN BLDV-MCNC: 21 MG/DL
CALCIUM SERPL-MCNC: 9.3 MG/DL
CHLORIDE BLD-SCNC: 106 MMOL/L
CHOLESTEROL/HDL RATIO: 3.6
CHOLESTEROL: 186 MG/DL
CO2: 31 MMOL/L
CREAT SERPL-MCNC: 0.75 MG/DL
EOSINOPHILS ABSOLUTE: 0.39 X10^3UL
EOSINOPHILS RELATIVE PERCENT: 2.1 %
ERYTHROCYTE [DISTWIDTH] IN BLOOD BY AUTOMATED COUNT: 46.2 FL
GFR AFRICAN AMERICAN: 96 ML/M1.7
GFR NON-AFRICAN AMERICAN: 79.4 ML/M1.7
GLUCOSE: 77 MG/DL
HCT VFR BLD CALC: 43.6 %
HDLC SERPL-MCNC: 51 MG/DL
HEMOGLOBIN: 14.2 G/DL
IMMATURE GRANULOCYTES %: 1 %
IMMATURE GRANULOCYTES ABSOLUTE: 0.19 X10^3UL
LDL CHOLESTEROL CALCULATED: 99 MG/DL
LYMPHOCYTES ABSOLUTE: 7.92 X10^3UL
LYMPHOCYTES RELATIVE PERCENT: 42.3 %
MCH RBC QN AUTO: 29.5 PG
MCHC RBC AUTO-ENTMCNC: 32.6 G/DL
MCV RBC AUTO: 90.6 FL
MONOCYTES ABSOLUTE: 1.09 X10^3UL
MONOCYTES RELATIVE PERCENT: 5.8 %
NEUTROPHILS ABSOLUTE: 9.04 X10^3UL
PLATELETS: 445 X10^3UL
POTASSIUM SERPL-SCNC: 4.1 MMOL/L
RBC: 4.81 X10^6UL
SEGMENTED NEUTROPHILS RELATIVE PERCENT: 48.3 %
SODIUM BLD-SCNC: 144 MMOL/L
T4 FREE: 1.19 UG/DL
TOTAL PROTEIN: 6.5 G/DL
TRIGL SERPL-MCNC: 179 MG/DL
TSH SERPL DL<=0.05 MIU/L-ACNC: 1.9 MIU/L
VITAMIN D 25-HYDROXY: 14.9 NG/ML
VLDLC SERPL CALC-MCNC: 36 MG/DL
WBC: 18.72 X10^3UL

## 2024-02-11 DIAGNOSIS — R79.89 ABNORMAL CBC: Primary | ICD-10-CM

## 2024-02-11 DIAGNOSIS — E55.9 VITAMIN D DEFICIENCY: ICD-10-CM

## 2024-02-11 RX ORDER — ERGOCALCIFEROL 1.25 MG/1
50000 CAPSULE ORAL WEEKLY
Qty: 4 CAPSULE | Refills: 3 | Status: SHIPPED | OUTPATIENT
Start: 2024-02-11

## 2024-02-12 ENCOUNTER — HOSPITAL ENCOUNTER (OUTPATIENT)
Dept: PHYSICAL THERAPY | Facility: CLINIC | Age: 59
Setting detail: THERAPIES SERIES
Discharge: HOME | End: 2024-02-12
Payer: COMMERCIAL

## 2024-02-12 PROCEDURE — 97161 PT EVAL LOW COMPLEX 20 MIN: CPT

## 2024-02-12 PROCEDURE — 97140 MANUAL THERAPY 1/> REGIONS: CPT

## 2024-02-12 NOTE — CONSULTS
work about 6.5 hours. Pt also notes at the end of her work day she gets \"desk syndrome\" where she finds herself slumped over due to fatigue.     Pain present?    Location R side of neck, radiates up the side of the neck and around the shoulder blade   Pain Rating currently 4/10   Pain at worse 7-8/10     Pain at best 3-4/10   Description of pain Aching   Altered Sensation neg   What makes it worse Tax season, working at her desk, long drives, lifting   What makes it better Heat    Symptom progression Not changed   Sleep Does not wake up     Comorbidities: EDS  Past Medical History:   Diagnosis Date    Arthritis     rheumatology   [facility]    Chronic pain     Complex regional pain syndrome i of right lower limb 10/02/2019    pain mgmt   Dr. DONNA Reid [facility] [address]   Has pain stimulator.    CRPS (complex regional pain syndrome type I)     Depression 06/27/2019    counseling    Fibromyalgia     Hyperlipidemia     pcp manages    Snores     airway resistance- getting a cpap    Tachycardia     Dr. Pastrana last seen 2021    Wellness examination     Zhane Farah last seen 1/2022     Tests: [x] CT   TECHNIQUE:  CT of the cervical spine was performed without the administration of  intravenous contrast. Multiplanar reformatted images are provided for review.  Automated exposure control, iterative reconstruction, and/or weight based  adjustment of the mA/kV was utilized to reduce the radiation dose to as low  as reasonably achievable.     COMPARISON:  None.     HISTORY:  ORDERING SYSTEM PROVIDED HISTORY: Cervical radiculopathy  TECHNOLOGIST PROVIDED HISTORY:  new onset neck pain with radiculopathy     FINDINGS:  BONES/ALIGNMENT: There is no acute fracture or traumatic malalignment.     DEGENERATIVE CHANGES: Moderate pattern of chronic degenerative change with  disc space narrowing and spondylosis at C4-5 through C6-7.  No significant  spinal canal stenosis.     SOFT TISSUES: Paraspinal soft tissues are normal.

## 2024-02-13 ENCOUNTER — HOSPITAL ENCOUNTER (OUTPATIENT)
Dept: MAMMOGRAPHY | Age: 59
Discharge: HOME | End: 2024-02-15
Attending: FAMILY MEDICINE
Payer: COMMERCIAL

## 2024-02-13 DIAGNOSIS — Z78.0 POST-MENOPAUSAL: ICD-10-CM

## 2024-02-13 PROCEDURE — 77080 DXA BONE DENSITY AXIAL: CPT

## 2024-02-20 ENCOUNTER — HOSPITAL ENCOUNTER (OUTPATIENT)
Dept: PHYSICAL THERAPY | Facility: CLINIC | Age: 59
Setting detail: THERAPIES SERIES
Discharge: HOME | End: 2024-02-20
Payer: COMMERCIAL

## 2024-02-20 PROCEDURE — 97110 THERAPEUTIC EXERCISES: CPT

## 2024-02-20 PROCEDURE — 97140 MANUAL THERAPY 1/> REGIONS: CPT

## 2024-02-20 NOTE — FLOWSHEET NOTE
visits: 2/8     Cancels/No Shows: 0/0  Frequency:  2 x/week for 8 visits       Subjective:    Pain:  [x] Yes  [] No   Location: R side of neck, radiates up the side of the neck and around the shoulder blade    Pain Rating: (0-10 scale) 4/10  Pain altered Tx:  [x] No  [] Yes  Action:    Comments: The arrives reporting moderate pain symptoms in the R side of neck. Notes later in the day the shoulder blades become more irritable. HP does held reduce \"tightness\" in thoracic spine/shoulder blades.     Objective:  Modalities: HP during manual 2/20  Precautions: EDS; No electrical stimulation due to 2 stimulators implanted in spine; lower thoracic spine disc herniation; hx of CRPS on RLE  Exercises:  Exercise Reps/ Time Weight/ Level Comments   Supine      Manual  15'   Pt position: supine  MFR to the (R) cervical paraspinals, upper trap, scalenes, and SCM   Supine cervical retraction 10x   Into pillow    AAROM flexion 10x  1# wand    Tony SA punch  15x  2#           Sidelying    Tactile cueing throughout, weakness in R UE 2/20    ER  10x  2# L   2# R    Horiz abd  10x 2# L   0# R    Abduction  10x 2# L  2# R          Seated       Upper trap stretch 2x30\"ea   Overpressure    Levator scap stretch 2x30\"ea  Overpressure    Seated scapular retractions 10x   Cuing to decrease upper trap compensations             Other:     Specific Instructions for next treatment:  Manual interventions to the cervical spine (myofascial release)  Gentle thoracic spinal mobility  Improved scapular strengthening  Muscular endurance training       Treatment Charges: Mins Units   []  Modalities     [x]  Ther Exercise 40 3   [x]  Manual Therapy 15 1   []  Ther Activities     []  Neuro Re-ed     []  Vasocompression     [] Gait     []  Other     Total Billable time 55 4       Assessment: [x] Progressing toward goals. Began session with seated cervical stretches and scap retraction with discomfort in R side of neck however, able to hold duration of

## 2024-02-22 ENCOUNTER — HOSPITAL ENCOUNTER (OUTPATIENT)
Dept: PHYSICAL THERAPY | Facility: CLINIC | Age: 59
Setting detail: THERAPIES SERIES
Discharge: HOME | End: 2024-02-22
Payer: COMMERCIAL

## 2024-02-22 PROCEDURE — 97140 MANUAL THERAPY 1/> REGIONS: CPT

## 2024-02-22 PROCEDURE — 97110 THERAPEUTIC EXERCISES: CPT

## 2024-02-22 NOTE — FLOWSHEET NOTE
[] [facility]  Outpatient Rehabilitation &  Therapy  [address]  [phone]  [phone] [x] [facility]  Outpatient Rehabilitation &  Therapy  [address]  P: [phone]  F: [phone] [] [facility]  Outpatient Rehabilitation &  Therapy  [address]  P: [phone]  F: [phone] [] [facility]  Outpatient Rehabilitation &  Therapy  [address]  [phone]  [phone] [] [facility]  Outpatient Rehabilitation &  Therapy  [address]   P: [phone]  F: [phone]  [] [facility]  Outpatient Rehabilitation &  Therapy  [address]  P: [phone]  F: [phone] [] [facility]  Outpatient Rehabilitation &  Therapy  [address]  P: [phone]  F: [phone] [] [facility]  Outpatient Rehabilitation &  Therapy  [address]  P: [phone]  F: [phone] [] [facility]  Outpatient Rehabilitation &  Therapy  [address]  P: [phone]  F: [phone]  [] [facility] Outpatient Rehabilitation &  Therapy  [address]  P: [phone]  F: [phone]     Physical Therapy Daily Treatment Note    Date:  2024  Patient Name:  Katie Moore    :  1965  MRN: [id number]  Physician: Mary Freeman MD                         Insurance: [employer] [facility] CHOICE PLUS ( visits remain- HARD MAX)  CPT codes verified:  92266; 20918; 15088; 13911; 22090; 24185; 84649; 30283, 81489; 19044; 11650; 74968, 21824; ;                               Excluded: / DRY NEEDLING IS NOT COVERED  Medical Diagnosis: M54.12 (ICD-10-CM) - Cervical radiculopathy   Rehab Codes: M54.2, M79.1, R29.3, M62.81  Onset Date: 24               Next 's appt.: 24    Visit# / total

## 2024-02-26 ENCOUNTER — HOSPITAL ENCOUNTER (OUTPATIENT)
Dept: PHYSICAL THERAPY | Facility: CLINIC | Age: 59
Setting detail: THERAPIES SERIES
Discharge: HOME | End: 2024-02-26
Payer: COMMERCIAL

## 2024-02-26 PROCEDURE — 97110 THERAPEUTIC EXERCISES: CPT

## 2024-02-26 PROCEDURE — 97140 MANUAL THERAPY 1/> REGIONS: CPT

## 2024-02-26 NOTE — FLOWSHEET NOTE
Instructions for next treatment:  Manual interventions to the cervical spine (myofascial release)  Gentle thoracic spinal mobility  Improved scapular strengthening  Muscular endurance training       Treatment Charges: Mins Units   []  Modalities     [x]  Ther Exercise 40 2   [x]  Manual Therapy 10 1   []  Ther Activities     []  Neuro Re-ed     []  Vasocompression     [] Gait     []  Other     Total Billable time 50 3       Assessment: [] Progressing toward goals.     [x] No change. Began with seated cervical AROM exercises to promote increased mobility needing cues to avoid trunk compensation. Progressed program to challenge scapular strength and stability. VC's to avoid UT compensation especially during resisted rows. Limited tolerance to trunk rotation exercises d/t pain. Tends to look down with exercises so provided vc's for forward gaze. Educated on postural awareness and avoiding UT compensation to reduce toll on neck musculature, pt ensures understanding. Applied manual interventions to improve mobility and tissue extensibility this date. Denies any change in sx's post tx. Encouraged use of heating pad at home. Will monitor sx's and progress program as able.     [] Other:  [x] Patient would continue to benefit from skilled physical therapy services in order to:  to improve neck pain and provided management tools for neck pain to improve comfort and tolerance to work specific activities.      Problems:                      [x] ? Cervical Pain: 3-8/10         [x] ? ROM: decreased cervical retraction                     [x] ? Strength:  Poor postural mm endurance   [x] ? Function: NDI: 17/50, 34% impairment   [x] Postural Deviations: forward head/rounded shoulders; decreased thoracic extension  [] Gait Deviations:   [] Other:                       STG: (to be met in 8 treatments)  ? Pain: Pt will report less than or equal to 3-4/10 neck pain when sitting for prolonged periods of time at work or in the car to

## 2024-02-29 ENCOUNTER — APPOINTMENT (OUTPATIENT)
Dept: PHYSICAL THERAPY | Facility: CLINIC | Age: 59
End: 2024-02-29
Payer: COMMERCIAL

## 2024-03-05 ENCOUNTER — HOSPITAL ENCOUNTER (OUTPATIENT)
Dept: PHYSICAL THERAPY | Facility: CLINIC | Age: 59
Setting detail: THERAPIES SERIES
Discharge: HOME | End: 2024-03-05
Payer: COMMERCIAL

## 2024-03-05 PROCEDURE — 97140 MANUAL THERAPY 1/> REGIONS: CPT

## 2024-03-05 PROCEDURE — 97110 THERAPEUTIC EXERCISES: CPT

## 2024-03-05 NOTE — FLOWSHEET NOTE
[] [facility]  Outpatient Rehabilitation &  Therapy  [address]  [phone]  [phone] [x] [facility]  Outpatient Rehabilitation &  Therapy  [address]  P: [phone]  F: [phone] [] [facility]  Outpatient Rehabilitation &  Therapy  [address]  P: [phone]  F: [phone] [] [facility]  Outpatient Rehabilitation &  Therapy  [address]  [phone]  [phone] [] [facility]  Outpatient Rehabilitation &  Therapy  [address]   P: [phone]  F: [phone]  [] [facility]  Outpatient Rehabilitation &  Therapy  [address]  P: [phone]  F: [phone] [] [facility]  Outpatient Rehabilitation &  Therapy  [address]  P: [phone]  F: [phone] [] [facility]  Outpatient Rehabilitation &  Therapy  [address]  P: [phone]  F: [phone] [] [facility]  Outpatient Rehabilitation &  Therapy  [address]  P: [phone]  F: [phone]  [] [facility] Outpatient Rehabilitation &  Therapy  [address]  P: [phone]  F: [phone]     Physical Therapy Daily Treatment Note    Date:  3/5/2024  Patient Name:  Katie Moore    :  1965  MRN: [id number]  Physician: Mary Freeman MD                         Insurance: [employer] [facility] CHOICE PLUS ( visits remain- HARD MAX)  CPT codes verified:  96911; 21603; 18330; 24152; 26951; 81876; 98656; 32301, 48876; 67417; 07301; 77568, 74734; ;                               Excluded: / DRY NEEDLING IS NOT COVERED  Medical Diagnosis: M54.12 (ICD-10-CM) - Cervical radiculopathy   Rehab Codes: M54.2, M79.1, R29.3, M62.81  Onset Date: 24               Next 's appt.: 24    Visit# / total

## 2024-03-08 ENCOUNTER — HOSPITAL ENCOUNTER (OUTPATIENT)
Dept: PHYSICAL THERAPY | Facility: CLINIC | Age: 59
Setting detail: THERAPIES SERIES
Discharge: HOME | End: 2024-03-08
Payer: COMMERCIAL

## 2024-03-08 PROCEDURE — 97110 THERAPEUTIC EXERCISES: CPT

## 2024-03-08 PROCEDURE — 97140 MANUAL THERAPY 1/> REGIONS: CPT

## 2024-03-08 NOTE — FLOWSHEET NOTE
strength of the deep neck flexors and spinal extensors to improve tolerance to sitting at her work desk.  Time Loaded Standing Test: 80\"  Deep neck flexor endurance test: 30\"  ? Function: Pt will score less than or equal to 24% on the NDI in order to demonstrate improved function on ADLs.  Independent with Home Exercise Programs. MET 3/5/24     Patient goals: \"feel better\"    Pt. Education:  [x] Yes  [] No  [x] Reviewed Prior HEP/Ed  Method of Education: [x] Verbal form, avoid UT comp, posture  [] Demo:  [] Written:     Access Code: [email or web address]  URL: [email or web address]  Date: 02/22/2024  Prepared by: Olga Hopkins  - Bella Pec Major Stretch  - 1 x daily - 7 x weekly - 3 sets - 2 reps - 20\" hold  - Sidelying Open Book Thoracic Rotation with Knee on Foam Roll  - 1 x daily - 7 x weekly - 3 sets - 3 reps - 10\" hold.   Access Code: [email or web address]  Exercises  - Supine Cervical Retraction with Towel  - 2-3 x daily - 2 sets - 10 reps  - Seated Cervical Sidebending Stretch  - 1-3 x daily - 3 sets - 30\" hold  - Seated Levator Scapulae Stretch  - 1-3 x daily - 3 sets - 30\" hold  - Seated Scapular Retraction  - 10 x daily - 10 reps  Patient Education  - Office Posture  3/5  - Standing Row with Anchored Resistance  - 1 x daily - 2 sets - 10 reps  - Shoulder External Rotation and Scapular Retraction with Resistance  - 1 x daily - 2 sets - 10 reps  - Scapular Wall Slides  - 1 x daily - 2 sets - 10 reps      Plan: [x] Continue current frequency toward long and short term goals.    [] Specific Instructions for subsequent treatments:       Time In: 2:02 pm            Time Out: 2:55 pm    Electronically signed by:  Kamala Haley PTA

## 2024-03-12 ENCOUNTER — HOSPITAL ENCOUNTER (OUTPATIENT)
Dept: PHYSICAL THERAPY | Facility: CLINIC | Age: 59
Setting detail: THERAPIES SERIES
Discharge: HOME | End: 2024-03-12
Payer: COMMERCIAL

## 2024-03-12 PROCEDURE — 97110 THERAPEUTIC EXERCISES: CPT

## 2024-03-12 PROCEDURE — 97140 MANUAL THERAPY 1/> REGIONS: CPT

## 2024-03-12 NOTE — FLOWSHEET NOTE
24% on the NDI in order to demonstrate improved function on ADLs.  Independent with Home Exercise Programs. MET 3/5/24     Patient goals: \"feel better\"    Pt. Education:  [x] Yes  [] No  [x] Reviewed Prior HEP/Ed  Method of Education: [x] Verbal for form, avoid UT comp, posture again.  [] Demo:  [] Written:     Access Code: [email or web address]  URL: [email or web address]  Date: 02/22/2024  Prepared by: Olga Hopkins  - Bella Pec Major Stretch  - 1 x daily - 7 x weekly - 3 sets - 2 reps - 20\" hold  - Sidelying Open Book Thoracic Rotation with Knee on Foam Roll  - 1 x daily - 7 x weekly - 3 sets - 3 reps - 10\" hold.   Access Code: [email or web address]  Exercises  - Supine Cervical Retraction with Towel  - 2-3 x daily - 2 sets - 10 reps  - Seated Cervical Sidebending Stretch  - 1-3 x daily - 3 sets - 30\" hold  - Seated Levator Scapulae Stretch  - 1-3 x daily - 3 sets - 30\" hold  - Seated Scapular Retraction  - 10 x daily - 10 reps  Patient Education  - Office Posture  3/5  - Standing Row with Anchored Resistance  - 1 x daily - 2 sets - 10 reps  - Shoulder External Rotation and Scapular Retraction with Resistance  - 1 x daily - 2 sets - 10 reps  - Scapular Wall Slides  - 1 x daily - 2 sets - 10 reps      Plan: [x] Continue current frequency toward long and short term goals.    [] Specific Instructions for subsequent treatments:       Time In: 2:01 pm            Time Out: 2:56 pm    Electronically signed by:  Jeffry Knight PTA

## 2024-03-14 ENCOUNTER — HOSPITAL ENCOUNTER (OUTPATIENT)
Dept: PHYSICAL THERAPY | Facility: CLINIC | Age: 59
Setting detail: THERAPIES SERIES
Discharge: HOME | End: 2024-03-14
Payer: COMMERCIAL

## 2024-03-14 PROCEDURE — 97110 THERAPEUTIC EXERCISES: CPT

## 2024-03-14 NOTE — DISCHARGE SUMMARY
[x] [facility]  Outpatient Rehabilitation &  Therapy  [address]  P: [phone]  F: [phone]  Physical Therapy Discharge Note    Date: 3/14/2024      Patient: Katie Moore  : 1965  MRN: [id number]    Physician: Mary Freeman MD                         Insurance: [employer] CHOICE PLUS ( visits remain- HARD MAX)  CPT codes verified:  74506; 02256; 51024; 57735; 38476; 15580; 83303; 21149, 76592; 28047; 27122; 42117, 12467; ;                               Excluded:  DRY NEEDLING IS NOT COVERED  Medical Diagnosis: M54.12 (ICD-10-CM) - Cervical radiculopathy   Rehab Codes: M54.2, M79.1, R29.3, M62.81  Onset Date: 24               Next 's appt.: 24     Visit# / total visits: 8/                        Cancels/No Shows: 0/0  Frequency:  2 x/week for 8 visits   Date of initial visit: 24                Date of final visit: 3/14/24    Subjective:    Pain:  [x] Yes  [] No     Location:  R side of neck, radiates up the side of the neck and around the shoulder blade   Pain Rating: (0-10 scale) see below/10  Pain altered Tx:  [x] No  [] Yes  Action:     Comments: Reports the mid-low back is 7.5/10 pain and neck is 4.5/10 pain. Pt reports she has not noticed any significant changes in her pain or her tolerance to sitting at work since starting therapy. Pt does report she is compliant with her HEP with doing the exercises throughout the day at work. Pt does admit to not doing her exercises every day and sometimes does miss a day secondary to not feeling great.     Objective:  Re-assessment 3/14/24  NDI: 30% impairment   Time Loaded Standing Test 2# ea: 75\"-- 91\"  Pt position: standing with shoulders elevated to 90 deg holding 2# weight each hand  Hold this position until fatigued     Cervical deep neck flexor endurance test: 23\"-- 31\"               STRENGTH     Left Right   Middle trap 5/5 4+/5   Lower trap 4/5 4+/5

## 2024-03-14 NOTE — FLOWSHEET NOTE
daily - 10 reps      Plan: [x] Discharge 03/14/24    [] Specific Instructions for subsequent treatments:       Time In: 202 pm            Time Out: 2:55 pm    Electronically signed by:  Tila Marcelino PT

## 2024-04-30 ENCOUNTER — OFFICE VISIT (OUTPATIENT)
Dept: NEUROLOGY | Age: 59
End: 2024-04-30
Payer: COMMERCIAL

## 2024-04-30 VITALS
DIASTOLIC BLOOD PRESSURE: 81 MMHG | HEART RATE: 123 BPM | HEIGHT: 62 IN | WEIGHT: 139.2 LBS | SYSTOLIC BLOOD PRESSURE: 135 MMHG | BODY MASS INDEX: 25.62 KG/M2

## 2024-04-30 DIAGNOSIS — M54.12 CERVICAL RADICULOPATHY: Primary | ICD-10-CM

## 2024-04-30 DIAGNOSIS — R20.8 DYSESTHESIA: ICD-10-CM

## 2024-04-30 DIAGNOSIS — M54.2 CERVICAL PAIN (NECK): ICD-10-CM

## 2024-04-30 DIAGNOSIS — F32.A DEPRESSION, UNSPECIFIED DEPRESSION TYPE: ICD-10-CM

## 2024-04-30 DIAGNOSIS — G90.521 COMPLEX REGIONAL PAIN SYNDROME I OF RIGHT LOWER LIMB: ICD-10-CM

## 2024-04-30 DIAGNOSIS — M48.04 THORACIC SPINAL STENOSIS: ICD-10-CM

## 2024-04-30 PROCEDURE — 99214 OFFICE O/P EST MOD 30 MIN: CPT | Performed by: STUDENT IN AN ORGANIZED HEALTH CARE EDUCATION/TRAINING PROGRAM

## 2024-04-30 PROCEDURE — G2211 COMPLEX E/M VISIT ADD ON: HCPCS | Performed by: STUDENT IN AN ORGANIZED HEALTH CARE EDUCATION/TRAINING PROGRAM

## 2024-04-30 RX ORDER — VENLAFAXINE HYDROCHLORIDE 75 MG/1
75 CAPSULE, EXTENDED RELEASE ORAL DAILY
Qty: 90 CAPSULE | Refills: 3 | Status: SHIPPED | OUTPATIENT
Start: 2024-04-30 | End: 2025-04-02

## 2024-04-30 NOTE — PROGRESS NOTES
bilaterally      Gait                   normal base and arm swing                  Medical Decision Making:   This is a 58 year old patient who presents for a follow up.     Dysesthesias of the right knee which is chronic since an injury in 2013.  She is diagnosed with complex regional pain syndrome  Continue to follow with pain management    Depression  Decrease to 75 mg daily as increased dose was not effective.   Discussed following up with PCP for continued management.     Midthoracic back pain due to disc herniations and thoracic cord flattening.  Continue to follow with pain management with 2 spinal cord stimulators in her thoracic spine    Neck pain with a dystonic feeling in her neck especially on the right and crampings of her hands bilaterally.   CT of her cervical spine in April 2023 showed no acute abnormalities.   She is unable to complete MRIs as she has two spinal cord stimulators             Mary Freeman MD   Neurology & Sleep Medicine  [facility]

## 2024-05-16 ENCOUNTER — TELEPHONE (OUTPATIENT)
Dept: INTERVENTIONAL RADIOLOGY/VASCULAR | Age: 59
End: 2024-05-16

## 2024-05-16 ENCOUNTER — HOSPITAL ENCOUNTER (OUTPATIENT)
Dept: GENERAL RADIOLOGY | Age: 59
Discharge: HOME | End: 2024-05-18
Payer: COMMERCIAL

## 2024-05-16 ENCOUNTER — HOSPITAL ENCOUNTER (OUTPATIENT)
Age: 59
Discharge: HOME | End: 2024-05-18
Payer: COMMERCIAL

## 2024-05-16 DIAGNOSIS — E55.9 VITAMIN D DEFICIENCY: ICD-10-CM

## 2024-05-16 DIAGNOSIS — M54.50 LOW BACK PAIN, UNSPECIFIED BACK PAIN LATERALITY, UNSPECIFIED CHRONICITY, UNSPECIFIED WHETHER SCIATICA PRESENT: ICD-10-CM

## 2024-05-16 PROCEDURE — 72110 X-RAY EXAM L-2 SPINE 4/>VWS: CPT

## 2024-05-16 RX ORDER — ERGOCALCIFEROL 1.25 MG/1
50000 CAPSULE ORAL WEEKLY
Qty: 12 CAPSULE | Refills: 3 | Status: SHIPPED | OUTPATIENT
Start: 2024-05-16

## 2024-05-30 ENCOUNTER — HOSPITAL ENCOUNTER (OUTPATIENT)
Dept: CT IMAGING | Age: 59
Discharge: HOME | End: 2024-06-01
Payer: COMMERCIAL

## 2024-05-30 ENCOUNTER — HOSPITAL ENCOUNTER (OUTPATIENT)
Dept: INTERVENTIONAL RADIOLOGY/VASCULAR | Age: 59
Discharge: HOME | End: 2024-06-01
Payer: COMMERCIAL

## 2024-05-30 ENCOUNTER — HOSPITAL ENCOUNTER (OUTPATIENT)
Dept: GENERAL RADIOLOGY | Age: 59
Discharge: HOME | End: 2024-06-01
Payer: COMMERCIAL

## 2024-05-30 VITALS
WEIGHT: 137.8 LBS | RESPIRATION RATE: 14 BRPM | SYSTOLIC BLOOD PRESSURE: 130 MMHG | OXYGEN SATURATION: 97 % | TEMPERATURE: 97.2 F | HEART RATE: 101 BPM | HEIGHT: 62 IN | BODY MASS INDEX: 25.36 KG/M2 | DIASTOLIC BLOOD PRESSURE: 69 MMHG

## 2024-05-30 DIAGNOSIS — M54.50 LOW BACK PAIN, UNSPECIFIED BACK PAIN LATERALITY, UNSPECIFIED CHRONICITY, UNSPECIFIED WHETHER SCIATICA PRESENT: ICD-10-CM

## 2024-05-30 DIAGNOSIS — R52 PAIN: ICD-10-CM

## 2024-05-30 DIAGNOSIS — M47.812 CERVICAL SPONDYLOSIS: ICD-10-CM

## 2024-05-30 LAB
INR PPP: 1
PLATELET # BLD AUTO: 373 K/UL (ref 138–453)
PROTHROMBIN TIME: 13.1 SEC (ref 11.5–14.2)

## 2024-05-30 PROCEDURE — 62305 MYELOGRAPHY LUMBAR INJECTION: CPT

## 2024-05-30 PROCEDURE — 72129 CT CHEST SPINE W/DYE: CPT

## 2024-05-30 PROCEDURE — 85049 AUTOMATED PLATELET COUNT: CPT

## 2024-05-30 PROCEDURE — 2709999900 IR MYELOGRAM 2 OR MORE REGIONS

## 2024-05-30 PROCEDURE — 85610 PROTHROMBIN TIME: CPT

## 2024-05-30 PROCEDURE — 6360000004 HC RX CONTRAST MEDICATION: Performed by: RADIOLOGY

## 2024-05-30 PROCEDURE — 2580000003 HC RX 258: Performed by: RADIOLOGY

## 2024-05-30 PROCEDURE — 72126 CT NECK SPINE W/DYE: CPT

## 2024-05-30 RX ORDER — SODIUM CHLORIDE 0.9 % (FLUSH) 0.9 %
5-40 SYRINGE (ML) INJECTION EVERY 12 HOURS SCHEDULED
Status: DISCONTINUED | OUTPATIENT
Start: 2024-05-30 | End: 2024-06-02 | Stop reason: HOSPADM

## 2024-05-30 RX ORDER — IOPAMIDOL 612 MG/ML
INJECTION, SOLUTION INTRATHECAL PRN
Status: COMPLETED | OUTPATIENT
Start: 2024-05-30 | End: 2024-05-30

## 2024-05-30 RX ORDER — SODIUM CHLORIDE 9 MG/ML
INJECTION, SOLUTION INTRAVENOUS PRN
Status: DISCONTINUED | OUTPATIENT
Start: 2024-05-30 | End: 2024-06-02 | Stop reason: HOSPADM

## 2024-05-30 RX ORDER — SODIUM CHLORIDE 0.9 % (FLUSH) 0.9 %
5-40 SYRINGE (ML) INJECTION PRN
Status: DISCONTINUED | OUTPATIENT
Start: 2024-05-30 | End: 2024-06-02 | Stop reason: HOSPADM

## 2024-05-30 RX ORDER — ACETAMINOPHEN 325 MG/1
650 TABLET ORAL EVERY 4 HOURS PRN
Status: DISCONTINUED | OUTPATIENT
Start: 2024-05-30 | End: 2024-06-02 | Stop reason: HOSPADM

## 2024-05-30 RX ADMIN — IOPAMIDOL 13 ML: 612 INJECTION, SOLUTION INTRATHECAL at 09:23

## 2024-05-30 RX ADMIN — SODIUM CHLORIDE: 9 INJECTION, SOLUTION INTRAVENOUS at 08:22

## 2024-05-30 ASSESSMENT — PAIN - FUNCTIONAL ASSESSMENT: PAIN_FUNCTIONAL_ASSESSMENT: NONE - DENIES PAIN

## 2024-05-30 ASSESSMENT — PAIN SCALES - GENERAL: PAINLEVEL_OUTOF10: 6

## 2024-05-30 ASSESSMENT — PAIN DESCRIPTION - FREQUENCY: FREQUENCY: CONTINUOUS

## 2024-05-30 ASSESSMENT — PAIN DESCRIPTION - PAIN TYPE: TYPE: CHRONIC PAIN

## 2024-05-30 ASSESSMENT — PAIN DESCRIPTION - LOCATION: LOCATION: BACK

## 2024-05-30 NOTE — DISCHARGE INSTRUCTIONS
1. A responsible person should transport and remain with patient for 24 hours for monitoring.    2. Limit strenuous activities for 24 hours.    3. Do not drive or operate machinery for 24 hours.    4. Increase oral fluids and diet for 24 hours.    5. If seizures should occur, call 911.    6. If persistent headache, nausea or vomiting occurs, promptly contact physician.    7. Keep head elevated 30-45 degrees until bedtime.    8. Contact your physician if you have any discomfort you feel is related to the procedure.    9. If unable to locate your physician or surgeon, call [facility] at [phone].

## 2024-05-30 NOTE — H&P
Interval H&P Note    Pt Name: Katie Moore  MRN: [id number]  YOB: 1965  Date of evaluation: 5/30/2024      [x] I have reviewed in epic the Neurology Progress Note by Dr Mary Freeman dated 4/30/24 attached below for the Interval History and Physical note.     [x] I have examined  Katie Moore, a 58 y.o. female with known HLD, complex regional pain syndrome, chronic pain with two spinal stimulators who arrived for a scheduled MYELOGRAM 2 OR MORE LEVELS in IR by DR. SUERO for Cervical spondylosis. The patient denies new health changes, fever, chills, wheezing, cough, increased SOB, chest pain, open sores or wounds.    Hx tachycardia and followed with Dr Pastrana  She was last seen in 2021 The patient continues to have asymptomatic tachycardic rate and denies dizziness, lightheadedness, syncope, increased SOB or chest pain     Vital signs: /74   Pulse (!) 117   Temp 97.8 °F (36.6 °C)   Resp 17   Ht 1.575 m (5' 2\")   Wt 62.5 kg (137 lb 12.8 oz)   LMP 03/14/2013   SpO2 97%   BMI 25.20 kg/m²     Allergies:  Trazodone and nefazodone and Nucynta [tapentadol]    Medications:    Prior to Admission medications    Medication Sig Start Date End Date Taking? Authorizing Provider   vitamin D (ERGOCALCIFEROL) 1.25 MG (59816 UT) CAPS capsule TAKE 1 CAPSULE BY MOUTH ONCE WEEKLY 5/16/24   Zhane Farah DO   venlafaxine (EFFEXOR XR) 75 MG extended release capsule Take 1 capsule by mouth daily 4/30/24 4/2/25  Mary Freeman MD   predniSONE (DELTASONE) 20 MG tablet Take 3 tablets daily for 3 days, then 2 tablets daily for 3 days, then 1 tablet daily for 3 days, then stop  Patient not taking: Reported on 4/30/2024 1/31/24   Mary Freeman MD   simvastatin (ZOCOR) 80 MG tablet Take 1 tablet by mouth nightly 12/11/23   Zhane Farah DO   celecoxib (CELEBREX) 200 MG capsule Take 1 capsule by mouth daily 12/11/23   Zhane Farah, DO   cyclobenzaprine (FLEXERIL) 10 MG tablet Take 1 tablet by mouth 3 times daily 12/4/23 
Private car

## 2024-06-20 ENCOUNTER — TELEPHONE (OUTPATIENT)
Dept: FAMILY MEDICINE CLINIC | Age: 59
End: 2024-06-20

## 2024-06-20 NOTE — TELEPHONE ENCOUNTER
Spine from [facility] called the disk that was sent is missing the images of the CT CERVICAL SPINE W CONTRAST, and XR LUMBAR SPINE. And is also missing the reports of the IR MYELOGRAM 2 OR MORE REGIONS. She is requesting another disk or if the office can push her the imaging and reports. Please advise.       (P) [phone]  (F) [phone]

## 2024-06-21 NOTE — TELEPHONE ENCOUNTER
Attempted to call [phone], was not a direct line, unable to inform that we did not sent the referral to [facility].

## 2024-07-16 NOTE — PROGRESS NOTES
Dermatology Procedure Note   [facility] PHYSICIANS  [facility] DERMATOLOGY  [address]  [facility] [address]  Dept: [phone]  Dept Fax: [phone]      Procedure Date: 8/24/2021  Procedure Time: 12:56 PM    Procedure Practitioner: Nuria Mansfield MD    Procedure: Excision    Pre-Procedure Diagnosis: Cyst    Post-Procedure Diagnosis: Same as Pre-Procedure Diagnosis    Informed Consent: The procedure and its risks were explained including but not limited to pain, bleeding, infection, permanent scar, permanent pigment alteration and recurrence. Consent to proceed with the procedure was obtained from the patient or the parent by the practitioner    Time Out:  A time out was conducted immediately before starting the procedure that confirmed a final verification of the correct patient, correct procedure, and correct site. Procedure Details:  Excision and layered closure: The 22 mm lesion on the left neck was cleaned with chlorhexidine and anesthetized with 1% lidocaine with epinephrine. The lesion was then excised in an elliptical fashion down to subcutaneous fat with a 0 mm margin for a total excised diameter of 22 mm. Hemostasis was then achieved with electrocautery. Due to tension on the defect, undermining was performed to allow for closure. The subcutaneous tissues were then brought together with 4-0 Vicryl. The epidermis was approximated with 5-0 gut. running sutures. Final layered closure was 22 mm. Vaseline and a bulky wound dressing was applied.     Procedure Performed By: Nuria Mansfield MD    Estimated Blood Loss: Minimal    Pathologic Specimen: H&E    Procedure Tolerance: Good    Complication(s): None    Electronically signed by Nuria Mansfield MD on 8/24/21 at 12:56 PM EDT GYN US

## 2024-09-16 RX ORDER — CYCLOBENZAPRINE HCL 10 MG
10 TABLET ORAL 3 TIMES DAILY
Qty: 90 TABLET | Refills: 2 | Status: SHIPPED | OUTPATIENT
Start: 2024-09-16

## 2024-12-12 RX ORDER — CYCLOBENZAPRINE HCL 10 MG
10 TABLET ORAL 3 TIMES DAILY
Qty: 90 TABLET | Refills: 2 | Status: SHIPPED | OUTPATIENT
Start: 2024-12-12

## 2024-12-12 NOTE — TELEPHONE ENCOUNTER
Pharmacy requesting refill of Flexeril 10mg.      Medication active on med list yes      Date of last Rx: 9/16/2024 with 2 refills          verified by ANAIS DELCID      Date of last appointment 4/30/2024    Next Visit Date:  Visit date not found

## 2025-01-06 DIAGNOSIS — G89.29 CHRONIC BILATERAL THORACIC BACK PAIN: ICD-10-CM

## 2025-01-06 DIAGNOSIS — M54.6 CHRONIC BILATERAL THORACIC BACK PAIN: ICD-10-CM

## 2025-01-06 RX ORDER — CELECOXIB 200 MG/1
200 CAPSULE ORAL DAILY
Qty: 90 CAPSULE | Refills: 0 | Status: SHIPPED | OUTPATIENT
Start: 2025-01-06

## 2025-02-07 ENCOUNTER — TELEPHONE (OUTPATIENT)
Dept: NEUROLOGY | Age: 60
End: 2025-02-07

## 2025-03-04 DIAGNOSIS — E78.5 HYPERLIPIDEMIA, UNSPECIFIED HYPERLIPIDEMIA TYPE: ICD-10-CM

## 2025-03-04 DIAGNOSIS — G62.9 NEUROPATHY: ICD-10-CM

## 2025-03-04 RX ORDER — SIMVASTATIN 80 MG
80 TABLET ORAL NIGHTLY
Qty: 90 TABLET | Refills: 3 | Status: SHIPPED | OUTPATIENT
Start: 2025-03-04

## 2025-03-04 NOTE — TELEPHONE ENCOUNTER
Katie Moore is calling to request a refill on the following medication(s):    Last Visit Date (If Applicable):  12/11/2023    Next Visit Date:    Visit date not found    Medication Request:  Requested Prescriptions     Pending Prescriptions Disp Refills    simvastatin (ZOCOR) 80 MG tablet [Pharmacy Med Name: SIMVASTATIN 80 MG TABLET] 90 tablet 3     Sig: TAKE ONE TABLET BY MOUTH ONCE NIGHTLY

## 2025-03-11 NOTE — TELEPHONE ENCOUNTER
Pharmacy requesting refill of Flexeril.      Medication active on med list yes      Date of last fill: 12/12/24  verified on 3/11/2025   verified by SF LPN      Date of last appointment 4/30/24    Next Visit Date:  4/17/2025

## 2025-03-13 RX ORDER — CYCLOBENZAPRINE HCL 10 MG
10 TABLET ORAL 3 TIMES DAILY
Qty: 90 TABLET | Refills: 1 | Status: SHIPPED | OUTPATIENT
Start: 2025-03-13

## 2025-04-03 DIAGNOSIS — G89.29 CHRONIC BILATERAL THORACIC BACK PAIN: ICD-10-CM

## 2025-04-03 DIAGNOSIS — M54.6 CHRONIC BILATERAL THORACIC BACK PAIN: ICD-10-CM

## 2025-04-03 RX ORDER — CELECOXIB 200 MG/1
200 CAPSULE ORAL DAILY
Qty: 90 CAPSULE | Refills: 3 | Status: SHIPPED | OUTPATIENT
Start: 2025-04-03

## 2025-04-17 ENCOUNTER — OFFICE VISIT (OUTPATIENT)
Dept: NEUROLOGY | Age: 60
End: 2025-04-17
Payer: COMMERCIAL

## 2025-04-17 VITALS
WEIGHT: 137 LBS | HEIGHT: 62 IN | DIASTOLIC BLOOD PRESSURE: 71 MMHG | BODY MASS INDEX: 25.21 KG/M2 | SYSTOLIC BLOOD PRESSURE: 148 MMHG | HEART RATE: 132 BPM

## 2025-04-17 DIAGNOSIS — M62.838 MUSCLE SPASM: ICD-10-CM

## 2025-04-17 DIAGNOSIS — F32.A DEPRESSION, UNSPECIFIED DEPRESSION TYPE: Primary | ICD-10-CM

## 2025-04-17 PROCEDURE — 99215 OFFICE O/P EST HI 40 MIN: CPT | Performed by: STUDENT IN AN ORGANIZED HEALTH CARE EDUCATION/TRAINING PROGRAM

## 2025-04-17 RX ORDER — VENLAFAXINE HYDROCHLORIDE 75 MG/1
75 CAPSULE, EXTENDED RELEASE ORAL DAILY
Qty: 90 CAPSULE | Refills: 3 | Status: SHIPPED | OUTPATIENT
Start: 2025-04-17 | End: 2026-03-20

## 2025-04-17 RX ORDER — CYCLOBENZAPRINE HCL 10 MG
10 TABLET ORAL 3 TIMES DAILY
Qty: 90 TABLET | Refills: 1 | Status: SHIPPED | OUTPATIENT
Start: 2025-04-17

## 2025-04-17 NOTE — PROGRESS NOTES
status: Former    Substances: Occasionally   Substance and Sexual Activity    Alcohol use: Yes     Alcohol/week: 0.0 standard drinks of alcohol     Comment: rarely    Drug use: No    Sexual activity: Not on file   Other Topics Concern    Not on file   Social History Narrative    Not on file     Social Drivers of Health     Financial Resource Strain: Low Risk  (12/11/2023)    Overall Financial Resource Strain (CARDIA)     Difficulty of Paying Living Expenses: Not hard at all   Food Insecurity: No Food Insecurity (12/11/2023)    Hunger Vital Sign     Worried About Running Out of Food in the Last Year: Never true     Ran Out of Food in the Last Year: Never true   Transportation Needs: Unknown (12/11/2023)    PRAPARE - Transportation     Lack of Transportation (Medical): Not on file     Lack of Transportation (Non-Medical): No   Physical Activity: Not on file   Stress: Not on file   Social Connections: Not on file   Intimate Partner Violence: Not on file   Housing Stability: Unknown (12/11/2023)    Housing Stability Vital Sign     Unable to Pay for Housing in the Last Year: Not on file     Number of Places Lived in the Last Year: Not on file     Unstable Housing in the Last Year: No       Family History:   Family History   Adopted: Yes   Family history unknown: Yes       Home Medications:  Prior to Admission medications    Medication Sig Start Date End Date Taking? Authorizing Provider   celecoxib (CELEBREX) 200 MG capsule TAKE 1 CAPSULE BY MOUTH DAILY 4/3/25  Yes Zhane Farah DO   cyclobenzaprine (FLEXERIL) 10 MG tablet TAKE 1 TABLET BY MOUTH 3 TIMES A DAY 3/13/25  Yes Karen Cabezas MD   simvastatin (ZOCOR) 80 MG tablet TAKE ONE TABLET BY MOUTH ONCE NIGHTLY 3/4/25  Yes Zhane Farah DO   vitamin D (ERGOCALCIFEROL) 1.25 MG (25953 UT) CAPS capsule TAKE 1 CAPSULE BY MOUTH ONCE WEEKLY 5/16/24  Yes Zhane Farah DO   oxyCODONE-acetaminophen (PERCOCET) 5-325 MG per tablet Take 1 tablet by mouth every 4 hours as needed

## 2025-07-08 DIAGNOSIS — M62.838 MUSCLE SPASM: ICD-10-CM

## 2025-07-09 RX ORDER — CYCLOBENZAPRINE HCL 10 MG
10 TABLET ORAL 3 TIMES DAILY
Qty: 90 TABLET | Refills: 2 | Status: SHIPPED | OUTPATIENT
Start: 2025-07-09

## 2025-07-09 NOTE — TELEPHONE ENCOUNTER
Can you please review for Dr. Cabezas?    Pharmacy requesting refill of Flexeril 10mg.      Medication active on med list yes      Date of last fill: 4/17/25 #90 R-1  verified on 7/9/2025   verified by VS LPN      Date of last appointment 4/17/25    Next Visit Date:  Visit date not found

## (undated) DEVICE — STRIP,CLOSURE,WOUND,MEDI-STRIP,1/2X4: Brand: MEDLINE

## (undated) DEVICE — GAUZE,SPONGE,4"X4",16PLY,STRL,LF,10/TRAY: Brand: MEDLINE

## (undated) DEVICE — PREMIUM DRY TRAY LF: Brand: MEDLINE INDUSTRIES, INC.

## (undated) DEVICE — SUTURE MCRYL + SZ 4-0 L27IN ABSRB UD L19MM PS-2 3/8 CIR MCP426H

## (undated) DEVICE — GAMMEX® NON-LATEX PI ORTHO SIZE 7, STERILE POLYISOPRENE POWDER-FREE SURGICAL GLOVE: Brand: GAMMEX

## (undated) DEVICE — DRAPE,REIN 53X77,STERILE: Brand: MEDLINE

## (undated) DEVICE — MASTISOL ADHESIVE AMPULE

## (undated) DEVICE — GLOVE ORANGE PI 7   MSG9070

## (undated) DEVICE — MHPB GEN MINOR PACK: Brand: MEDLINE INDUSTRIES, INC.

## (undated) DEVICE — SUTURE NONABSORBABLE MONOFILAMENT 4-0 PS-2 18 IN BLK ETHILON 1667G

## (undated) DEVICE — PAD,NON-ADHERENT,3X8,STERILE,LF,1/PK: Brand: MEDLINE

## (undated) DEVICE — YANKAUER,BULB TIP,W/O VENT,RIGID,STERILE: Brand: MEDLINE

## (undated) DEVICE — PACK PROCEDURE SURG CYSTO SVMMC LF

## (undated) DEVICE — SOLUTION SCRB 4OZ 4% CHG H2O AIDED FOR PREOPERATIVE SKIN

## (undated) DEVICE — GLOVE ORANGE PI 7 1/2   MSG9075

## (undated) DEVICE — SOLUTION IV IRRIG POUR BRL 0.9% SODIUM CHL 2F7124

## (undated) DEVICE — SUTURE VCRL + SZ 3-0 L27IN ABSRB UD L26MM SH 1/2 CIR VCP416H